# Patient Record
Sex: MALE | Race: WHITE | NOT HISPANIC OR LATINO | Employment: FULL TIME | URBAN - METROPOLITAN AREA
[De-identification: names, ages, dates, MRNs, and addresses within clinical notes are randomized per-mention and may not be internally consistent; named-entity substitution may affect disease eponyms.]

---

## 2018-08-29 ENCOUNTER — OFFICE VISIT (OUTPATIENT)
Dept: FAMILY MEDICINE CLINIC | Facility: CLINIC | Age: 53
End: 2018-08-29
Payer: COMMERCIAL

## 2018-08-29 VITALS — BODY MASS INDEX: 26.92 KG/M2 | HEIGHT: 70 IN | TEMPERATURE: 97.5 F | WEIGHT: 188 LBS

## 2018-08-29 DIAGNOSIS — R21 RASH: Primary | ICD-10-CM

## 2018-08-29 PROCEDURE — 3008F BODY MASS INDEX DOCD: CPT | Performed by: FAMILY MEDICINE

## 2018-08-29 PROCEDURE — 1036F TOBACCO NON-USER: CPT | Performed by: FAMILY MEDICINE

## 2018-08-29 PROCEDURE — 99213 OFFICE O/P EST LOW 20 MIN: CPT | Performed by: FAMILY MEDICINE

## 2018-08-29 RX ORDER — METHYLPREDNISOLONE 4 MG/1
TABLET ORAL
Qty: 21 TABLET | Refills: 0 | Status: SHIPPED | OUTPATIENT
Start: 2018-08-29 | End: 2019-10-15 | Stop reason: SDUPTHER

## 2018-08-29 RX ORDER — METHYLPREDNISOLONE ACETATE 80 MG/ML
80 INJECTION, SUSPENSION INTRA-ARTICULAR; INTRALESIONAL; INTRAMUSCULAR; SOFT TISSUE ONCE
Status: COMPLETED | OUTPATIENT
Start: 2018-08-29 | End: 2018-08-29

## 2018-08-29 RX ADMIN — METHYLPREDNISOLONE ACETATE 80 MG: 80 INJECTION, SUSPENSION INTRA-ARTICULAR; INTRALESIONAL; INTRAMUSCULAR; SOFT TISSUE at 19:33

## 2018-08-29 NOTE — PROGRESS NOTES
Assessment/Plan:    No problem-specific Assessment & Plan notes found for this encounter  Diagnoses and all orders for this visit:    Rash  -     Methylprednisolone 4 MG TBPK; Use as directed on package          Subjective:      Patient ID: Manny Grayson is a 46 y o  male  Pt complains of poison rash starting 3 days ago, pt got into it prior, pt has tried benadryl and ivy soap which is not helping, it is itching        The following portions of the patient's history were reviewed and updated as appropriate: allergies, current medications, past family history, past medical history, past social history, past surgical history and problem list     Review of Systems   Constitutional: Negative for chills and fever  Respiratory: Negative for shortness of breath and wheezing  Objective:      Temp 97 5 °F (36 4 °C)   Ht 5' 10" (1 778 m)   Wt 85 3 kg (188 lb)   BMI 26 98 kg/m²          Physical Exam   Constitutional: He is oriented to person, place, and time  He appears well-developed and well-nourished  No distress  HENT:   Head: Normocephalic and atraumatic  Eyes: Conjunctivae and EOM are normal  Pupils are equal, round, and reactive to light  No scleral icterus  Neck: Normal range of motion  Neck supple  Cardiovascular: Normal rate, regular rhythm and normal heart sounds  No murmur heard  Pulmonary/Chest: Effort normal and breath sounds normal  No respiratory distress  He has no wheezes  He has no rales  Musculoskeletal: He exhibits no edema  Lymphadenopathy:     He has no cervical adenopathy  Neurological: He is alert and oriented to person, place, and time  He exhibits normal muscle tone  Skin: Skin is warm and dry  Rash noted  He is not diaphoretic  No erythema  No pallor  Psychiatric: He has a normal mood and affect  His behavior is normal  Judgment and thought content normal    Nursing note and vitals reviewed

## 2019-09-10 ENCOUNTER — VBI (OUTPATIENT)
Dept: ADMINISTRATIVE | Facility: OTHER | Age: 54
End: 2019-09-10

## 2019-09-10 NOTE — TELEPHONE ENCOUNTER
Manny Grayson    ED Visit Information     Ed visit date: 8/26/19  Diagnosis Description: FOOD IN ESOPHAGUS CAUSING OTHER INJURY, INITIAL ENCOUNTER  In Network? No  Discharge status: Home  Discharged with meds ? No  Number of ED visits to date: 1  ED Severity:3     Outreach Information    Outreach successful: Yes 1   letter mailed:0  Date Finalized:9/10/19    Care Coordination    Follow up appointment with pcp: no declined  Transportation issues ? No    [employer] type:  7 Day Outreach  Emergent necessity warranted by diagnosis:  Yes  [facility] PCP:  Yes  Transportation:  Friend/Family Transport  Called PCP first?:  No  Feels able to call PCP for urgent problems ?:  Yes  Understands what emergencies can be handled by PCP ?:  Yes  Ever any problems getting appointment with PCP for minor emergency/urgency problems?:  No  Practice Contacted Patient ?:  No  Pt had ED follow up with pcp/staff ?:  No    Reason Patient went to ED instead of Urgent Care or PCP?:  Perceived Severity of Illness, Proximity  Urgent care Education?:  No  09/10/2019 11:32 AM Phone ([facility] Michael Blanton (Self) [phone] (H) Remove  Left Message - Attx1 - food in esophagus lvh    I spoke to Madonna King he stated he is doing better he declined follow up with his pcp  He went to [facility] due to how close it is to his home, and the severity of his complication pt stated the food was lodged for almost 11hrs until it went through - Beef brisket  [facility] is aware of [facility] to his home

## 2019-10-15 ENCOUNTER — OFFICE VISIT (OUTPATIENT)
Dept: FAMILY MEDICINE CLINIC | Facility: CLINIC | Age: 54
End: 2019-10-15
Payer: COMMERCIAL

## 2019-10-15 VITALS
HEIGHT: 70 IN | SYSTOLIC BLOOD PRESSURE: 124 MMHG | BODY MASS INDEX: 27.03 KG/M2 | HEART RATE: 86 BPM | DIASTOLIC BLOOD PRESSURE: 86 MMHG | OXYGEN SATURATION: 95 % | WEIGHT: 188.8 LBS | TEMPERATURE: 98.6 F

## 2019-10-15 DIAGNOSIS — Z12.11 SCREENING FOR COLON CANCER: ICD-10-CM

## 2019-10-15 DIAGNOSIS — L24.9 IRRITANT CONTACT DERMATITIS, UNSPECIFIED TRIGGER: Primary | ICD-10-CM

## 2019-10-15 DIAGNOSIS — Z13.31 DEPRESSION SCREENING NEGATIVE: ICD-10-CM

## 2019-10-15 PROCEDURE — 3008F BODY MASS INDEX DOCD: CPT | Performed by: NURSE PRACTITIONER

## 2019-10-15 PROCEDURE — 99213 OFFICE O/P EST LOW 20 MIN: CPT | Performed by: NURSE PRACTITIONER

## 2019-10-15 RX ORDER — METHYLPREDNISOLONE 4 MG/1
TABLET ORAL
Qty: 21 TABLET | Refills: 0 | Status: SHIPPED | OUTPATIENT
Start: 2019-10-15 | End: 2022-05-05 | Stop reason: ALTCHOICE

## 2019-10-15 RX ORDER — TRIAMCINOLONE ACETONIDE 40 MG/ML
40 INJECTION, SUSPENSION INTRA-ARTICULAR; INTRAMUSCULAR ONCE
Status: SHIPPED | OUTPATIENT
Start: 2019-10-15

## 2019-10-15 NOTE — PROGRESS NOTES
Assessment/Plan:    No problem-specific Assessment & Plan notes found for this encounter  Diagnoses and all orders for this visit:    Irritant contact dermatitis, unspecified trigger  Comments:  Rx for Medrol dose pack provided, Kenalog 40mg POCT provided  Orders:  -     methylPREDNISolone 4 MG tablet therapy pack; Use as directed on package    Screening for colon cancer  Comments:    Cologuard ordered  Orders:  -     Cologuard; Future    BMI 27 0-27 9,adult  Comments:  Pt counciled    Depression screening negative    Other orders  -     Cancel: Ambulatory referral to Gastroenterology; Future          Subjective:      Patient ID: Lilia Longo is a 53 y o  male  Rash  Patient presents for evaluation of a rash involving the face, forearm, hand, lower extremity and upper extremity  Rash started 2 days ago  Lesions are red, and raised in texture  Rash has changed over time  Rash is pruritic  Associated symptoms: itchy eyes  Patient denies: abdominal pain, crankiness, decrease in appetite and decrease in energy level  Patient has had contacts with similar rash  Patient has had new exposures , plants while cutting wood 2 days ago  Pt states rash is spreading and worsening itch      Pt declines physical exam and lab work             The following portions of the patient's history were reviewed and updated as appropriate: He  has no past medical history on file  He   Patient Active Problem List    Diagnosis Date Noted    BMI 27 0-27 9,adult 10/15/2019    Pneumonia 05/11/2016    Hypercholesteremia 05/10/2016     He  has no past surgical history on file  His family history includes Coronary artery disease in his father; Diabetes in his father; Kidney failure in his father  He  reports that he has never smoked  He uses smokeless tobacco  He reports that he drinks alcohol  He reports that he does not use drugs    Current Outpatient Medications   Medication Sig Dispense Refill    methylPREDNISolone 4 MG tablet therapy pack Use as directed on package 21 tablet 0     No current facility-administered medications for this visit  Current Outpatient Medications on File Prior to Visit   Medication Sig    [DISCONTINUED] Methylprednisolone 4 MG TBPK Use as directed on package (Patient not taking: Reported on 10/15/2019)     No current facility-administered medications on file prior to visit  He has No Known Allergies       Review of Systems   Constitutional: Negative  Itching   HENT: Negative  Eyes: Negative  Respiratory: Negative  Cardiovascular: Negative  Gastrointestinal: Negative  Endocrine: Negative  Genitourinary: Negative  Musculoskeletal: Negative  Skin: Positive for color change and rash  Scattered itching rash X2 days   Allergic/Immunologic: Negative  Neurological: Negative  Hematological: Negative  Psychiatric/Behavioral: Negative  BMI Counseling: Body mass index is 27 09 kg/m²  Discussed the patient's BMI with him  The BMI is above normal  Nutrition recommendations include reducing portion sizes, decreasing overall calorie intake, 3-5 servings of fruits/vegetables daily, reducing fast food intake, consuming healthier snacks, decreasing soda and/or juice intake, moderation in carbohydrate intake, increasing intake of lean protein, reducing intake of saturated fat and trans fat and reducing intake of cholesterol  Exercise recommendations include exercising 3-5 times per week  PHQ-9 Depression Screening    PHQ-9:    Frequency of the following problems over the past two weeks:       Little interest or pleasure in doing things:  0 - not at all  Feeling down, depressed, or hopeless:  0 - not at all  PHQ-2 Score:  0        Depression Screening Follow-up Plan: Patient's depression screening was negative with a PHQ-2 score of 0  Their PHQ-9 score was   Clinically patient does not have depression  No treatment is required        Objective:      /86 Pulse 86   Temp 98 6 °F (37 °C) (Tympanic)   Ht 5' 10" (1 778 m)   Wt 85 6 kg (188 lb 12 8 oz)   SpO2 95%   BMI 27 09 kg/m²          Physical Exam   Constitutional: He is oriented to person, place, and time  He appears well-developed and well-nourished  HENT:   Head: Normocephalic  Eyes: Pupils are equal, round, and reactive to light  Conjunctivae and EOM are normal    Neck: Normal range of motion  Neck supple  Cardiovascular: Normal rate and regular rhythm  Pulmonary/Chest: Effort normal and breath sounds normal    Abdominal: Soft  Bowel sounds are normal    Musculoskeletal: Normal range of motion  Neurological: He is alert and oriented to person, place, and time  Skin: Skin is warm and dry  Rash noted  Rash is papular and urticarial  There is erythema  Psychiatric: He has a normal mood and affect   His behavior is normal  Judgment and thought content normal

## 2019-10-28 LAB
COLOGUARD RESULT REPORTABLE: NEGATIVE
EXTERNAL COLOGUARD RESULT: NEGATIVE

## 2020-03-11 ENCOUNTER — CLINICAL SUPPORT (OUTPATIENT)
Dept: FAMILY MEDICINE CLINIC | Facility: CLINIC | Age: 55
End: 2020-03-11
Payer: COMMERCIAL

## 2020-03-11 DIAGNOSIS — Z23 ENCOUNTER FOR IMMUNIZATION: Primary | ICD-10-CM

## 2020-03-11 PROCEDURE — 90715 TDAP VACCINE 7 YRS/> IM: CPT

## 2020-03-11 PROCEDURE — 90471 IMMUNIZATION ADMIN: CPT

## 2022-04-04 ENCOUNTER — APPOINTMENT (EMERGENCY)
Dept: RADIOLOGY | Facility: HOSPITAL | Age: 57
End: 2022-04-04
Payer: COMMERCIAL

## 2022-04-04 ENCOUNTER — HOSPITAL ENCOUNTER (EMERGENCY)
Facility: HOSPITAL | Age: 57
Discharge: HOME | End: 2022-04-04
Attending: EMERGENCY MEDICINE
Payer: COMMERCIAL

## 2022-04-04 VITALS
TEMPERATURE: 97 F | BODY MASS INDEX: 28.27 KG/M2 | WEIGHT: 197 LBS | SYSTOLIC BLOOD PRESSURE: 125 MMHG | DIASTOLIC BLOOD PRESSURE: 79 MMHG | HEART RATE: 72 BPM | RESPIRATION RATE: 16 BRPM | OXYGEN SATURATION: 97 %

## 2022-04-04 DIAGNOSIS — M62.82 RHABDOMYOLYSIS: Primary | ICD-10-CM

## 2022-04-04 LAB
2HR DELTA HS TROPONIN: 0 NG/L
ALBUMIN SERPL BCP-MCNC: 4.3 G/DL (ref 3.5–5)
ALP SERPL-CCNC: 74 U/L (ref 34–104)
ALT SERPL W P-5'-P-CCNC: 49 U/L (ref 7–52)
ANION GAP SERPL CALCULATED.3IONS-SCNC: 9 MMOL/L (ref 4–13)
AST SERPL W P-5'-P-CCNC: 44 U/L (ref 13–39)
ATRIAL RATE: 61 BPM
ATRIAL RATE: 67 BPM
BASOPHILS # BLD AUTO: 0.09 THOUSANDS/ΜL (ref 0–0.1)
BASOPHILS NFR BLD AUTO: 1 % (ref 0–1)
BILIRUB SERPL-MCNC: 0.65 MG/DL (ref 0.2–1)
BNP SERPL-MCNC: 27 PG/ML (ref 1–100)
BUN SERPL-MCNC: 24 MG/DL (ref 5–25)
CALCIUM SERPL-MCNC: 9.5 MG/DL (ref 8.4–10.2)
CARDIAC TROPONIN I PNL SERPL HS: 5 NG/L
CARDIAC TROPONIN I PNL SERPL HS: 5 NG/L
CHLORIDE SERPL-SCNC: 103 MMOL/L (ref 96–108)
CK MB SERPL-MCNC: 1.6 % (ref 0–2.5)
CK MB SERPL-MCNC: 1.7 % (ref 0–2.5)
CK MB SERPL-MCNC: 13.5 NG/ML (ref 0.6–6.3)
CK MB SERPL-MCNC: 15.3 NG/ML (ref 0.6–6.3)
CK SERPL-CCNC: 789 U/L (ref 39–308)
CK SERPL-CCNC: 977 U/L (ref 39–308)
CO2 SERPL-SCNC: 26 MMOL/L (ref 21–32)
CREAT SERPL-MCNC: 1.17 MG/DL (ref 0.6–1.3)
D DIMER PPP FEU-MCNC: 0.39 UG/ML FEU
EOSINOPHIL # BLD AUTO: 0.27 THOUSAND/ΜL (ref 0–0.61)
EOSINOPHIL NFR BLD AUTO: 4 % (ref 0–6)
ERYTHROCYTE [DISTWIDTH] IN BLOOD BY AUTOMATED COUNT: 13.4 % (ref 11.6–15.1)
GFR SERPL CREATININE-BSD FRML MDRD: 68 ML/MIN/1.73SQ M
GLUCOSE SERPL-MCNC: 97 MG/DL (ref 65–140)
HCT VFR BLD AUTO: 45.7 % (ref 36.5–49.3)
HGB BLD-MCNC: 15.4 G/DL (ref 12–17)
IMM GRANULOCYTES # BLD AUTO: 0.02 THOUSAND/UL (ref 0–0.2)
IMM GRANULOCYTES NFR BLD AUTO: 0 % (ref 0–2)
LACTATE SERPL-SCNC: 0.8 MMOL/L (ref 0.5–2)
LIPASE SERPL-CCNC: 68 U/L (ref 11–82)
LYMPHOCYTES # BLD AUTO: 1.69 THOUSANDS/ΜL (ref 0.6–4.47)
LYMPHOCYTES NFR BLD AUTO: 24 % (ref 14–44)
MAGNESIUM SERPL-MCNC: 1.9 MG/DL (ref 1.9–2.7)
MCH RBC QN AUTO: 28.5 PG (ref 26.8–34.3)
MCHC RBC AUTO-ENTMCNC: 33.7 G/DL (ref 31.4–37.4)
MCV RBC AUTO: 85 FL (ref 82–98)
MONOCYTES # BLD AUTO: 0.72 THOUSAND/ΜL (ref 0.17–1.22)
MONOCYTES NFR BLD AUTO: 10 % (ref 4–12)
NEUTROPHILS # BLD AUTO: 4.29 THOUSANDS/ΜL (ref 1.85–7.62)
NEUTS SEG NFR BLD AUTO: 61 % (ref 43–75)
NRBC BLD AUTO-RTO: 0 /100 WBCS
P AXIS: 60 DEGREES
P AXIS: 61 DEGREES
PLATELET # BLD AUTO: 200 THOUSANDS/UL (ref 149–390)
PMV BLD AUTO: 10.1 FL (ref 8.9–12.7)
POTASSIUM SERPL-SCNC: 3.6 MMOL/L (ref 3.5–5.3)
PR INTERVAL: 162 MS
PR INTERVAL: 162 MS
PROT SERPL-MCNC: 6.8 G/DL (ref 6.4–8.4)
QRS AXIS: 46 DEGREES
QRS AXIS: 48 DEGREES
QRSD INTERVAL: 94 MS
QRSD INTERVAL: 96 MS
QT INTERVAL: 424 MS
QT INTERVAL: 452 MS
QTC INTERVAL: 448 MS
QTC INTERVAL: 455 MS
RBC # BLD AUTO: 5.4 MILLION/UL (ref 3.88–5.62)
SODIUM SERPL-SCNC: 138 MMOL/L (ref 135–147)
T WAVE AXIS: 10 DEGREES
T WAVE AXIS: 17 DEGREES
TSH SERPL DL<=0.05 MIU/L-ACNC: 1.86 UIU/ML (ref 0.45–5.33)
VENTRICULAR RATE: 61 BPM
VENTRICULAR RATE: 67 BPM
WBC # BLD AUTO: 7.08 THOUSAND/UL (ref 4.31–10.16)

## 2022-04-04 PROCEDURE — 83880 ASSAY OF NATRIURETIC PEPTIDE: CPT | Performed by: EMERGENCY MEDICINE

## 2022-04-04 PROCEDURE — 99284 EMERGENCY DEPT VISIT MOD MDM: CPT | Performed by: EMERGENCY MEDICINE

## 2022-04-04 PROCEDURE — 99284 EMERGENCY DEPT VISIT MOD MDM: CPT

## 2022-04-04 PROCEDURE — 83690 ASSAY OF LIPASE: CPT | Performed by: EMERGENCY MEDICINE

## 2022-04-04 PROCEDURE — 85379 FIBRIN DEGRADATION QUANT: CPT | Performed by: EMERGENCY MEDICINE

## 2022-04-04 PROCEDURE — 93010 ELECTROCARDIOGRAM REPORT: CPT | Performed by: INTERNAL MEDICINE

## 2022-04-04 PROCEDURE — 80053 COMPREHEN METABOLIC PANEL: CPT | Performed by: EMERGENCY MEDICINE

## 2022-04-04 PROCEDURE — 84484 ASSAY OF TROPONIN QUANT: CPT | Performed by: EMERGENCY MEDICINE

## 2022-04-04 PROCEDURE — 71045 X-RAY EXAM CHEST 1 VIEW: CPT

## 2022-04-04 PROCEDURE — 83605 ASSAY OF LACTIC ACID: CPT | Performed by: EMERGENCY MEDICINE

## 2022-04-04 PROCEDURE — 82550 ASSAY OF CK (CPK): CPT | Performed by: EMERGENCY MEDICINE

## 2022-04-04 PROCEDURE — 96361 HYDRATE IV INFUSION ADD-ON: CPT

## 2022-04-04 PROCEDURE — 96360 HYDRATION IV INFUSION INIT: CPT

## 2022-04-04 PROCEDURE — 85025 COMPLETE CBC W/AUTO DIFF WBC: CPT | Performed by: EMERGENCY MEDICINE

## 2022-04-04 PROCEDURE — 36415 COLL VENOUS BLD VENIPUNCTURE: CPT | Performed by: EMERGENCY MEDICINE

## 2022-04-04 PROCEDURE — 93005 ELECTROCARDIOGRAM TRACING: CPT

## 2022-04-04 PROCEDURE — 83735 ASSAY OF MAGNESIUM: CPT | Performed by: EMERGENCY MEDICINE

## 2022-04-04 PROCEDURE — 82553 CREATINE MB FRACTION: CPT | Performed by: EMERGENCY MEDICINE

## 2022-04-04 PROCEDURE — 84443 ASSAY THYROID STIM HORMONE: CPT | Performed by: EMERGENCY MEDICINE

## 2022-04-04 RX ORDER — SODIUM CHLORIDE 9 MG/ML
3 INJECTION INTRAVENOUS
Status: DISCONTINUED | OUTPATIENT
Start: 2022-04-04 | End: 2022-04-04 | Stop reason: HOSPADM

## 2022-04-04 RX ADMIN — SODIUM CHLORIDE 1000 ML: 0.9 INJECTION, SOLUTION INTRAVENOUS at 02:22

## 2022-04-04 RX ADMIN — SODIUM CHLORIDE 1000 ML: 0.9 INJECTION, SOLUTION INTRAVENOUS at 03:20

## 2022-04-04 NOTE — Clinical Note
Valerie Butler was seen and treated in our emergency department on 4/4/2022  Diagnosis:     Suhas Job  may return to work on return date  He may return on this date: 04/06/2022         If you have any questions or concerns, please don't hesitate to call        Jareth Rousseau MD    ______________________________           _______________          _______________  Hospital Representative                              Date                                Time

## 2022-04-04 NOTE — ED PROCEDURE NOTE
PROCEDURE  ECG 12 Lead Documentation Only    Date/Time: 4/4/2022 1:54 AM  Performed by: Ildefonso Aleman MD  Authorized by: Ildefonso Aleman MD     Indications / Diagnosis:  Diaphoresis  Patient location:  ED  Interpretation:     Interpretation: normal    Rate:     ECG rate:  67    ECG rate assessment: normal    Rhythm:     Rhythm: sinus rhythm    Ectopy:     Ectopy: none    QRS:     QRS axis:  Normal    QRS intervals:  Normal  Conduction:     Conduction: normal    ST segments:     ST segments:  Non-specific  T waves:     T waves: non-specific           Ildefonso Aleman MD  04/04/22 [id number]

## 2022-04-04 NOTE — ED PROCEDURE NOTE
PROCEDURE  ECG 12 Lead Documentation Only    Date/Time: 4/4/2022 2:56 AM  Performed by: Jayshree Baires MD  Authorized by: Jayshree Baires MD     Indications / Diagnosis:  Diaphoresis  ECG reviewed by me, the ED Provider: yes    Previous ECG:     Comparison to cardiac monitor: Yes    Interpretation:     Interpretation: non-specific    Rate:     ECG rate:  61    ECG rate assessment: normal    Rhythm:     Rhythm: sinus rhythm    Ectopy:     Ectopy: none    QRS:     QRS axis:  Normal    QRS intervals:  Normal  Conduction:     Conduction: normal    ST segments:     ST segments:  Non-specific  T waves:     T waves: non-specific           Jayshree Baires MD  04/04/22 [id number]

## 2022-04-04 NOTE — ED PROVIDER NOTES
History  Chief Complaint   Patient presents with    Nausea     feeling jittery       61-YEAR-OLD MALE  PMH:   None, h/o hypertriglyceridemia years ago  No f/u   Non smoker, no drugs/etoh    Fam:   Father had MI in his 42's  Brother  at 39 of massive MI  He was a smoker      CCl:  Pt here for feeling sweats, "it feels like I am going nuts on caffeine"  I had some pain down the left arm  The jitteriness comes and goes      MODE OF TRANSPORT  AMBULATORY, PATIENT WAS BROUGHT BY PRIVATE CAR      PATIENT HAS HAD NO COMPLAINTS OF CHEST PAIN OR PRESSURE  NO SHORTNESS OF BREATH      HE DENIES ANY PAIN TO THE JAW NECK OR THE BACK  HE DENIES PAIN WITH DEEP BREATH      NO SYNCOPE OR NEAR SYNCOPE   NO PALPITATIONS      INTERVENTIONS: NONE      TRAUMA:  NONE  PATIENT DID NOT FALL OR HIT HER HEAD  HE HAS NO INJURY TO ARMS OR LEGS  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        INFECTIOUS SYMPTOMS:   PATIENT DENIES FEVERS, REPORTS CHILLS  NO SINUS SYMPTOMS  NO HEADACHE OR NECK PAIN, NO DIZZINESS  PT WAS LIGHTHEADED EARLIER   HE DENIES SORE THROAT, DENIES SINUS CONGESTION      VTE  RISK FACTORS:  NONE  NO HISTORY OF PE OR DVT  NO LONG CAR RIDES OR PLANE RIDES  NO IMMOBILIZATION  NO RECENT SURGERY OR TRAUMA  DENIES HEMOPTYSIS  OTHER ASSOCIATED SYMPTOMS:  NO ABDOMINAL PAIN  NO ACUTE BACK PAIN  NO NAUSEA OR VOMITING  NO SOURCES OF BLEEDING  NO BLACK OR BLOODY STOOLS  NO STOOL CHANGES  NO URINARY COMPLAINTS: NO DYSURIA, NO HEMATURIA, NO FREQUENCY          Nausea  The primary symptoms include nausea  Primary symptoms do not include fever, weight loss, fatigue, abdominal pain, vomiting, diarrhea, melena, hematemesis, jaundice, hematochezia, dysuria, myalgias, arthralgias or rash  The illness is also significant for chills  The illness does not include odynophagia, bloating, constipation or back pain     Fatigue  Severity:  Mild  Onset quality:  Gradual  Relieved by: Nothing  Worsened by:  Nothing  Ineffective treatments:  None tried  Associated symptoms: nausea    Associated symptoms: no abdominal pain, no arthralgias, no ataxia, no chest pain, no cough, no diarrhea, no difficulty walking, no dizziness, no dysuria, no numbness in extremities, no falls, no fever, no foul-smelling urine, no frequency, no headaches, no hematochezia, no lethargy, no loss of consciousness, no melena, no myalgias, no near-syncope, no seizures, no sensory-motor deficit, no shortness of breath, no stroke symptoms, no syncope, no urgency, no vision change and no vomiting        Prior to Admission Medications   Prescriptions Last Dose Informant Patient Reported? Taking? 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), distal amputation        Family History   Problem Relation Age of Onset    No Known Problems Mother     Coronary artery disease Father     Kidney failure Father     Heart attack Father     Diabetes type II Father     Heart attack Sister      I have reviewed and agree with the history as documented  E-Cigarette/Vaping    E-Cigarette Use Never User      E-Cigarette/Vaping Substances     Social History     Tobacco Use    Smoking status: Never Smoker    Smokeless tobacco: Former User   Vaping Use    Vaping Use: Never used   Substance Use Topics    Alcohol use: Not Currently     Comment: once per month    Drug use: No       Review of Systems   Constitutional: Positive for chills and diaphoresis  Negative for fatigue, fever and weight loss     HENT: Negative for ear discharge, ear pain, facial swelling, rhinorrhea, sinus pressure, sinus pain, sneezing, sore throat, trouble swallowing and voice change  Eyes: Negative for photophobia, pain, discharge, redness, itching and visual disturbance  Respiratory: Negative for cough, shortness of breath, wheezing and stridor  Cardiovascular: Negative for chest pain, palpitations, leg swelling, syncope and near-syncope  Gastrointestinal: Positive for nausea  Negative for abdominal pain, bloating, blood in stool, constipation, diarrhea, hematemesis, hematochezia, jaundice, melena and vomiting  Genitourinary: Negative for difficulty urinating, dysuria, flank pain, frequency, hematuria and urgency  Musculoskeletal: Negative for arthralgias, back pain, falls, gait problem, joint swelling, myalgias, neck pain and neck stiffness  Skin: Negative for rash and wound  Neurological: Positive for light-headedness  Negative for dizziness, seizures, loss of consciousness, syncope, facial asymmetry, speech difficulty, numbness and headaches  All other systems reviewed and are negative  Physical Exam  Physical Exam  Constitutional:       General: He is not in acute distress  Appearance: He is well-developed  He is not ill-appearing, toxic-appearing or diaphoretic  HENT:      Head: Normocephalic and atraumatic  Right Ear: External ear normal       Left Ear: External ear normal       Nose: Nose normal  No congestion or rhinorrhea  Eyes:      General: No scleral icterus  Right eye: No discharge  Left eye: No discharge  Extraocular Movements: Extraocular movements intact  Conjunctiva/sclera: Conjunctivae normal       Pupils: Pupils are equal, round, and reactive to light  Neck:      Vascular: No JVD  Trachea: No tracheal deviation  Cardiovascular:      Rate and Rhythm: Normal rate and regular rhythm  Pulses: Normal pulses  Heart sounds: Normal heart sounds  No murmur heard  No friction rub  No gallop  Pulmonary:      Effort: Pulmonary effort is normal  No respiratory distress  Breath sounds: Normal breath sounds  No stridor  No wheezing, rhonchi or rales  Chest:      Chest wall: No tenderness  Abdominal:      General: Bowel sounds are normal  There is no distension  Palpations: Abdomen is soft  There is no mass  Tenderness: There is no abdominal tenderness  There is no right CVA tenderness, left CVA tenderness, guarding or rebound  Hernia: No hernia is present  Musculoskeletal:         General: No swelling, tenderness, deformity or signs of injury  Normal range of motion  Cervical back: Normal range of motion and neck supple  No rigidity or tenderness  Right lower leg: No edema  Left lower leg: No edema  Lymphadenopathy:      Cervical: No cervical adenopathy  Skin:     General: Skin is warm  Capillary Refill: Capillary refill takes less than 2 seconds  Coloration: Skin is not jaundiced or pale  Findings: No bruising, erythema, lesion or rash  Neurological:      General: No focal deficit present  Mental Status: He is alert and oriented to person, place, and time  Mental status is at baseline  Cranial Nerves: No cranial nerve deficit  Sensory: No sensory deficit  Motor: No weakness or abnormal muscle tone  Coordination: Coordination normal    Psychiatric:         Mood and Affect: Mood normal          Behavior: Behavior normal          Thought Content:  Thought content normal          Judgment: Judgment normal          Vital Signs  ED Triage Vitals   Temperature Pulse Respirations Blood Pressure SpO2   04/04/22 0104 04/04/22 0104 04/04/22 0104 04/04/22 0104 04/04/22 0104   (!) 97 °F (36 1 °C) 74 16 125/79 96 %      Temp src Heart Rate Source Patient Position - Orthostatic VS BP Location FiO2 (%)   -- 04/04/22 0540 -- -- --    Monitor         Pain Score       04/04/22 0104       No Pain           Vitals:    04/04/22 0104 04/04/22 0540   BP: 125/79    Pulse: 74 72         Visual Acuity      ED Medications  Medications   sodium chloride (PF) 0 9 % injection 3 mL (has no administration in time range)   sodium chloride 0 9 % bolus 1,000 mL (0 mL Intravenous Stopped 4/4/22 0328)   sodium chloride 0 9 % bolus 1,000 mL (0 mL Intravenous Stopped 4/4/22 0435)       Diagnostic Studies  Results Reviewed     Procedure Component Value Units Date/Time    CKMB [[id number]]  (Abnormal) Collected: 04/04/22 0441    Lab Status: Final result Specimen: Blood from Arm, Right Updated: 04/04/22 0535     CK-MB Index 1 7 %      CK-MB 13 5 ng/mL     CK (with reflex to MB) [[id number]]  (Abnormal) Collected: 04/04/22 0441    Lab Status: Final result Specimen: Blood from Arm, Right Updated: 04/04/22 0509     Total  U/L     HS Troponin I 4hr [[id number]]     Lab Status: No result Specimen: Blood     HS Troponin I 2hr [[id number]]  (Normal) Collected: 04/04/22 0304    Lab Status: Final result Specimen: Blood from Arm, Right Updated: 04/04/22 0332     hs TnI 2hr 5 ng/L      Delta 2hr hsTnI 0 ng/L     CKMB [[id number]]  (Abnormal) Collected: 04/04/22 0143    Lab Status: Final result Specimen: Blood from Arm, Right Updated: 04/04/22 0232     CK-MB Index 1 6 %      CK-MB 15 3 ng/mL     TSH [[id number]]  (Normal) Collected: 04/04/22 0143    Lab Status: Final result Specimen: Blood from Arm, Right Updated: 04/04/22 0222     TSH 3RD GENERATON 1 865 uIU/mL     Narrative:      Patients undergoing fluorescein dye angiography may retain small amounts of fluorescein in the body for 48-72 hours post procedure  Samples containing fluorescein can produce falsely depressed TSH values  If the patient had this procedure,a specimen should be resubmitted post fluorescein clearance        B-Type Natriuretic Peptide(BNP) CA, GH, EA Campuses Only [[id number]]  (Normal) Collected: 04/04/22 0143    Lab Status: Final result Specimen: Blood from Arm, Right Updated: 04/04/22 0216     BNP 27 pg/mL HS Troponin 0hr (reflex protocol) [[id number]]  (Normal) Collected: 04/04/22 0143    Lab Status: Final result Specimen: Blood from Arm, Right Updated: 04/04/22 0213     hs TnI 0hr 5 ng/L     Lactic acid [[id number]]  (Normal) Collected: 04/04/22 0143    Lab Status: Final result Specimen: Blood from Arm, Right Updated: 04/04/22 0210     LACTIC ACID 0 8 mmol/L     Narrative:      Result may be elevated if tourniquet was used during collection      Comprehensive metabolic panel [[id number]]  (Abnormal) Collected: 04/04/22 0143    Lab Status: Final result Specimen: Blood from Arm, Right Updated: 04/04/22 0210     Sodium 138 mmol/L      Potassium 3 6 mmol/L      Chloride 103 mmol/L      CO2 26 mmol/L      ANION GAP 9 mmol/L      BUN 24 mg/dL      Creatinine 1 17 mg/dL      Glucose 97 mg/dL      Calcium 9 5 mg/dL      AST 44 U/L      ALT 49 U/L      Alkaline Phosphatase 74 U/L      Total Protein 6 8 g/dL      Albumin 4 3 g/dL      Total Bilirubin 0 65 mg/dL      eGFR 68 ml/min/1 73sq m     Narrative:      [place] guidelines for Chronic Kidney Disease (CKD):     Stage 1 with normal or high GFR (GFR > 90 mL/min/1 73 square meters)    Stage 2 Mild CKD (GFR = 60-89 mL/min/1 73 square meters)    Stage 3A Moderate CKD (GFR = 45-59 mL/min/1 73 square meters)    Stage 3B Moderate CKD (GFR = 30-44 mL/min/1 73 square meters)    Stage 4 Severe CKD (GFR = 15-29 mL/min/1 73 square meters)    Stage 5 End Stage CKD (GFR <15 mL/min/1 73 square meters)  Note: GFR calculation is accurate only with a steady state creatinine    Magnesium [[id number]]  (Normal) Collected: 04/04/22 0143    Lab Status: Final result Specimen: Blood from Arm, Right Updated: 04/04/22 0210     Magnesium 1 9 mg/dL     Lipase [[id number]]  (Normal) Collected: 04/04/22 0143    Lab Status: Final result Specimen: Blood from Arm, Right Updated: 04/04/22 0210     Lipase 68 u/L     CK Total with Reflex CKMB [[id number]]  (Abnormal) Collected: 04/04/22 0143    Lab Status: Final result Specimen: Blood from Arm, Right Updated: 04/04/22 0210     Total  U/L     D-dimer, quantitative [[id number]]  (Normal) Collected: 04/04/22 0143    Lab Status: Final result Specimen: Blood from Arm, Right Updated: 04/04/22 0202     D-Dimer, Quant 0 39 ug/ml FEU     Narrative: In the evaluation for possible pulmonary embolism, in the appropriate (Well's Score of 4 or less) patient, the age adjusted d-dimer cutoff for this patient can be calculated as:    Age x 0 01 (in ug/mL) for Age-adjusted D-dimer exclusion threshold for a patient over 50 years      CBC and differential [[id number]] Collected: 04/04/22 0143    Lab Status: Final result Specimen: Blood from Arm, Right Updated: 04/04/22 0151     WBC 7 08 Thousand/uL      RBC 5 40 Million/uL      Hemoglobin 15 4 g/dL      Hematocrit 45 7 %      MCV 85 fL      MCH 28 5 pg      MCHC 33 7 g/dL      RDW 13 4 %      MPV 10 1 fL      Platelets 313 Thousands/uL      nRBC 0 /100 WBCs      Neutrophils Relative 61 %      Immat GRANS % 0 %      Lymphocytes Relative 24 %      Monocytes Relative 10 %      Eosinophils Relative 4 %      Basophils Relative 1 %      Neutrophils Absolute 4 29 Thousands/µL      Immature Grans Absolute 0 02 Thousand/uL      Lymphocytes Absolute 1 69 Thousands/µL      Monocytes Absolute 0 72 Thousand/µL      Eosinophils Absolute 0 27 Thousand/µL      Basophils Absolute 0 09 Thousands/µL                  X-ray chest 1 view portable    (Results Pending)              Procedures  Procedures         ED Course  ED Course as of 04/04/22 0558   Mon Apr 04, 2022   0122 Pt seen and evaluated    0206 CBC and differential   0206 D-dimer, quantitative   0245 Pt stable  Understands results of work up - mild Rhabdomyolysis   Will give 2 L NS  Will run delta troponin    0338 Delta 2hr hsTnI: 0   0451 Pt stable  IVF completed    0515 Total CK(!): 789   0516 Pt understands results  He will rest and drink plenty of fluids and f/u w/ PCP   He understands return precautions  He agrees w/ dx  He states he really overdid the heavy demolition and tile laying over the last 3 days   He is very appreciative of ED care and is ready to manage from home                                SBIRT 22yo+      Most Recent Value   SBIRT (22 yo +)    In order to provide better care to our patients, we are screening all of our patients for alcohol and drug use  Would it be okay to ask you these screening questions? Yes Filed at: 04/04/2022 0118   Initial Alcohol Screen: US AUDIT-C     1  How often do you have a drink containing alcohol? 1 Filed at: 04/04/2022 0118   2  How many drinks containing alcohol do you have on a typical day you are drinking? 0 Filed at: 04/04/2022 0118   3a  Male UNDER 65: How often do you have five or more drinks on one occasion? 0 Filed at: 04/04/2022 0118   3b  FEMALE Any Age, or MALE 65+: How often do you have 4 or more drinks on one occassion? 0 Filed at: 04/04/2022 0118   Audit-C Score 1 Filed at: 04/04/2022 0118   QING: How many times in the past year have you    Used an illegal drug or used a prescription medication for non-medical reasons? Never Filed at: 04/04/2022 0118                    MDM    Disposition  Final diagnoses:   Rhabdomyolysis     Time reflects when diagnosis was documented in both MDM as applicable and the Disposition within this note     Time User Action Codes Description Comment    4/4/2022  3:22 AM Natalia Liv Add [M62 82] Rhabdomyolysis       ED Disposition     ED Disposition Condition Date/Time Comment    Discharge Stable Mon Apr 4, 2022  5:16 AM Lalitha Pimentel discharge to home/self care  Follow-up Information     Follow up With Specialties Details Why Contact Lisa Quiroz, DO Family Medicine Call today  [address] [place] [id number] [phone]            Discharge Medication List as of 4/4/2022  5:16 AM      CONTINUE these medications which have NOT CHANGED    Details methylPREDNISolone 4 MG tablet therapy pack Use as directed on package, Normal             No discharge procedures on file      PDMP Review     None          ED Provider  Electronically Signed by           Mathew Akbar MD  04/04/22 1986

## 2022-04-04 NOTE — DISCHARGE INSTRUCTIONS
RETURN IF WORSE IN ANY WAY:   WORSENING CHEST PAIN,   SHORTNESS OF BREATH,   FEVER OR FLU LIKE SYMPTOMS,   OR NEW AND CONCERNING SYMPTOMS SIGNS OR SYMPTOMS      PLEASE CALL YOUR PRIMARY DOCTOR IN THE MORNING TO SET UP FOLLOW UP   PLEASE REVIEW THE WORK UP RESULTS WITH YOUR DOCTOR

## 2022-04-07 ENCOUNTER — OFFICE VISIT (OUTPATIENT)
Dept: FAMILY MEDICINE CLINIC | Facility: CLINIC | Age: 57
End: 2022-04-07
Payer: COMMERCIAL

## 2022-04-07 VITALS
DIASTOLIC BLOOD PRESSURE: 90 MMHG | TEMPERATURE: 98.3 F | OXYGEN SATURATION: 97 % | SYSTOLIC BLOOD PRESSURE: 140 MMHG | WEIGHT: 195 LBS | HEIGHT: 69 IN | BODY MASS INDEX: 28.88 KG/M2 | HEART RATE: 73 BPM

## 2022-04-07 DIAGNOSIS — R03.0 ELEVATED BP WITHOUT DIAGNOSIS OF HYPERTENSION: ICD-10-CM

## 2022-04-07 DIAGNOSIS — R53.83 OTHER FATIGUE: Primary | ICD-10-CM

## 2022-04-07 DIAGNOSIS — M62.82 NON-TRAUMATIC RHABDOMYOLYSIS: ICD-10-CM

## 2022-04-07 PROCEDURE — 99213 OFFICE O/P EST LOW 20 MIN: CPT | Performed by: NURSE PRACTITIONER

## 2022-04-07 PROCEDURE — 3008F BODY MASS INDEX DOCD: CPT | Performed by: NURSE PRACTITIONER

## 2022-04-07 PROCEDURE — 1036F TOBACCO NON-USER: CPT | Performed by: NURSE PRACTITIONER

## 2022-04-07 PROCEDURE — 3725F SCREEN DEPRESSION PERFORMED: CPT | Performed by: NURSE PRACTITIONER

## 2022-04-07 NOTE — PROGRESS NOTES
Assessment/Plan:    No problem-specific Assessment & Plan notes found for this encounter  Diagnoses and all orders for this visit:    Other fatigue  -     Lipid panel; Future  -     PSA, Total Screen; Future  -     Vitamin D 25 hydroxy; Future  -     Lyme Antibody Profile with reflex to WB; Future    Elevated BP without diagnosis of hypertension    Non-traumatic rhabdomyolysis          Subjective:      Patient ID: Edna Magana is a 61 y o  male  Patient presents for f/u from ER on 4/4/2022  He presented for jitters and pain in the left arm  He did alot of heavy demolition and tile lying the three days prior to the ER visit   to 789  CK-MB from 15 3 13 5  He had 2 L NSS in the ER  Patient states that since he has been discharged from the emergency department he is feeling better  His jitteriness has completely resolved as did his muscle aches  Spoke with patient regarding the importance of hydration especially while exerting himself  Signs and symptoms to monitor for reviewed with patient and his wife  Patient verbalized understanding  Patient's only complaint is chronic fatigue over the last few years  Labs ordered for patient  He does sleep usually about 6 or 7 hours and generally his sleep is restorative  Blood pressure is borderline at 1 40/90  Return in 4 weeks for annual physical with labs  The following portions of the patient's history were reviewed and updated as appropriate: allergies, current medications, past family history, past medical history, past social history, past surgical history and problem list     Review of Systems   Constitutional: Positive for fatigue  Negative for activity change, chills and fever  HENT: Negative for ear pain and sore throat  Eyes: Negative for pain and visual disturbance  Respiratory: Negative for cough and shortness of breath  Cardiovascular: Negative for chest pain and palpitations     Gastrointestinal: Negative for abdominal pain, constipation, diarrhea, nausea and vomiting  Genitourinary: Negative for dysuria and hematuria  Musculoskeletal: Negative for arthralgias, back pain and myalgias  Skin: Negative for color change and rash  Neurological: Negative for seizures, syncope and facial asymmetry  All other systems reviewed and are negative  Objective:      /90 (BP Location: Left arm, Patient Position: Sitting, Cuff Size: Standard)   Pulse 73   Temp 98 3 °F (36 8 °C) (Tympanic)   Ht 5' 9" (1 753 m)   Wt 88 5 kg (195 lb)   SpO2 97%   BMI 28 80 kg/m²          Physical Exam  Vitals and nursing note reviewed  Constitutional:       General: He is not in acute distress  Appearance: Normal appearance  He is not ill-appearing or toxic-appearing  HENT:      Head: Normocephalic and atraumatic  Cardiovascular:      Rate and Rhythm: Normal rate and regular rhythm  Pulses: Normal pulses  Heart sounds: Normal heart sounds  No murmur heard  No friction rub  No gallop  Pulmonary:      Effort: Pulmonary effort is normal  No respiratory distress  Breath sounds: Normal breath sounds  No stridor  No wheezing  Abdominal:      General: Abdomen is flat  Bowel sounds are normal       Palpations: Abdomen is soft  Tenderness: There is no abdominal tenderness  There is no guarding  Hernia: No hernia is present  Musculoskeletal:      Cervical back: Normal range of motion and neck supple  No rigidity  No muscular tenderness  Right lower leg: No edema  Left lower leg: No edema  Skin:     General: Skin is warm and dry  Capillary Refill: Capillary refill takes less than 2 seconds  Coloration: Skin is not jaundiced or pale  Findings: No bruising  Neurological:      General: No focal deficit present  Mental Status: He is alert and oriented to person, place, and time  Mental status is at baseline  Motor: No weakness        Gait: Gait normal    Psychiatric: Mood and Affect: Mood normal          Behavior: Behavior normal          Thought Content:  Thought content normal          Judgment: Judgment normal

## 2022-04-26 ENCOUNTER — RA CDI HCC (OUTPATIENT)
Dept: OTHER | Facility: HOSPITAL | Age: 57
End: 2022-04-26

## 2022-04-26 NOTE — PROGRESS NOTES
[facility]  coding opportunities       Chart reviewed, no opportunity found: CHART REVIEWED, NO OPPORTUNITY FOUND        Patients Insurance        Commercial Insurance: [address] [place] [address]

## 2022-04-30 ENCOUNTER — APPOINTMENT (OUTPATIENT)
Dept: LAB | Facility: CLINIC | Age: 57
End: 2022-04-30
Payer: COMMERCIAL

## 2022-05-03 ENCOUNTER — TELEPHONE (OUTPATIENT)
Dept: ADMINISTRATIVE | Facility: OTHER | Age: 57
End: 2022-05-03

## 2022-05-03 NOTE — TELEPHONE ENCOUNTER
----- Message from Kim Blanco sent at 2022  9:36 AM EDT -----  [address]  Contact: [phone]  22 9:36 AM    Rajat, our patient Juliette Levin has had CRC: Cologuard completed/performed  Please assist in updating the patient chart by pulling the Care Everywhere (CE) document  The date of service is 10/28/19       Thank you,  ARIEL Blanco PG

## 2022-05-05 ENCOUNTER — OFFICE VISIT (OUTPATIENT)
Dept: FAMILY MEDICINE CLINIC | Facility: CLINIC | Age: 57
End: 2022-05-05
Payer: COMMERCIAL

## 2022-05-05 VITALS
DIASTOLIC BLOOD PRESSURE: 80 MMHG | HEIGHT: 69 IN | OXYGEN SATURATION: 95 % | WEIGHT: 194.38 LBS | HEART RATE: 91 BPM | BODY MASS INDEX: 28.79 KG/M2 | TEMPERATURE: 98.9 F | SYSTOLIC BLOOD PRESSURE: 116 MMHG

## 2022-05-05 DIAGNOSIS — E78.1 HYPERTRIGLYCERIDEMIA: ICD-10-CM

## 2022-05-05 DIAGNOSIS — Z12.11 SCREENING FOR COLON CANCER: ICD-10-CM

## 2022-05-05 DIAGNOSIS — B49 FUNGAL INFECTION: ICD-10-CM

## 2022-05-05 DIAGNOSIS — Z00.00 ANNUAL PHYSICAL EXAM: Primary | ICD-10-CM

## 2022-05-05 PROCEDURE — 99396 PREV VISIT EST AGE 40-64: CPT | Performed by: NURSE PRACTITIONER

## 2022-05-05 PROCEDURE — 1036F TOBACCO NON-USER: CPT | Performed by: NURSE PRACTITIONER

## 2022-05-05 PROCEDURE — 3008F BODY MASS INDEX DOCD: CPT | Performed by: NURSE PRACTITIONER

## 2022-05-05 PROCEDURE — 3725F SCREEN DEPRESSION PERFORMED: CPT | Performed by: NURSE PRACTITIONER

## 2022-05-05 RX ORDER — KETOCONAZOLE 20 MG/G
CREAM TOPICAL DAILY
Qty: 15 G | Refills: 0 | Status: SHIPPED | OUTPATIENT
Start: 2022-05-05 | End: 2022-06-09

## 2022-05-05 NOTE — PATIENT INSTRUCTIONS
Heart Healthy Diet   WHAT YOU NEED TO KNOW:   A heart healthy diet is an eating plan low in unhealthy fats and sodium (salt)  The plan is high in healthy fats and fiber  A heart healthy diet helps improve your cholesterol levels and lowers your risk for heart disease and stroke  A dietitian will teach you how to read and understand food labels  DISCHARGE INSTRUCTIONS:   Heart healthy diet guidelines to follow:   · Choose foods that contain healthy fats  ? Unsaturated fats  include monounsaturated and polyunsaturated fats  Unsaturated fat is found in foods such as soybean, canola, olive, corn, and safflower oils  It is also found in soft tub margarine that is made with liquid vegetable oil  ? Omega-3 fat  is found in certain fish, such as salmon, tuna, and trout, and in walnuts and flaxseed  Eat fish high in omega-3 fats at least 2 times a week  · Get 20 to 30 grams of fiber each day  Fruits, vegetables, whole-grain foods, and legumes (cooked beans) are good sources of fiber  · Limit or do not have unhealthy fats  ? Cholesterol  is found in animal foods, such as eggs and lobster, and in dairy products made from whole milk  Limit cholesterol to less than 200 mg each day  ? Saturated fat  is found in meats, such as viramontes and hamburger  It is also found in chicken or turkey skin, whole milk, and butter  Limit saturated fat to less than 7% of your total daily calories  ? Trans fat  is found in packaged foods, such as potato chips and cookies  It is also in hard margarine, some fried foods, and shortening  Do not eat foods that contain trans fats  · Limit sodium as directed  You may be told to limit sodium to 2,000 to 2,300 mg each day  Choose low-sodium or no-salt-added foods  Add little or no salt to food you prepare  Use herbs and spices in place of salt         Include the following in your heart healthy plan:  Ask your dietitian or healthcare provider how many servings to have from each of the following food groups:  · Grains:      ? Whole-wheat breads, cereals, and pastas, and brown rice    ? Low-fat, low-sodium crackers and chips    · Vegetables:      ? Broccoli, green beans, green peas, and spinach    ? Collards, kale, and lima beans    ? Carrots, sweet potatoes, tomatoes, and peppers    ? Canned vegetables with no salt added    · Fruits:      ? Bananas, peaches, pears, and pineapple    ? Grapes, raisins, and dates    ? Oranges, tangerines, grapefruit, orange juice, and grapefruit juice    ? Apricots, mangoes, melons, and papaya    ? Raspberries and strawberries    ? Canned fruit with no added sugar    · Low-fat dairy:      ? Nonfat (skim) milk, 1% milk, and low-fat almond, cashew, or soy milks fortified with calcium    ? Low-fat cheese, regular or frozen yogurt, and cottage cheese    · Meats and proteins:      ? Lean cuts of beef and pork (loin, leg, round), skinless chicken and turkey    ? Legumes, soy products, egg whites, or nuts    Limit or do not include the following in your heart healthy plan:   · Unhealthy fats and oils:      ? Whole or 2% milk, cream cheese, sour cream, or cheese    ? High-fat cuts of beef (T-bone steaks, ribs), chicken or turkey with skin, and organ meats such as liver    ? Butter, stick margarine, shortening, and cooking oils such as coconut or palm oil    · Foods and liquids high in sodium:      ? Packaged foods, such as frozen dinners, cookies, macaroni and cheese, and cereals with more than 300 mg of sodium per serving    ? Vegetables with added sodium, such as instant potatoes, vegetables with added sauces, or regular canned vegetables    ? Cured or smoked meats, such as hot dogs, viramontes, and sausage    ? High-sodium ketchup, barbecue sauce, salad dressing, pickles, olives, soy sauce, or miso    · Foods and liquids high in sugar:      ? Candy, cake, cookies, pies, or doughnuts    ? Soft drinks (soda), sports drinks, or sweetened tea    ?  Canned or dry mixes for cakes, soups, sauces, or gravies    Other healthy heart guidelines:   · Do not smoke  Nicotine and other chemicals in cigarettes and cigars can cause lung and heart damage  Ask your healthcare provider for information if you currently smoke and need help to quit  E-cigarettes or smokeless tobacco still contain nicotine  Talk to your healthcare provider before you use these products  · Limit or do not drink alcohol as directed  Alcohol can damage your heart and raise your blood pressure  Your healthcare provider may give you specific daily and weekly limits  The general recommended limit is 1 drink a day for women 21 or older and for men 72 or older  Do not have more than 3 drinks in a day or 7 in a week  The recommended limit is 2 drinks a day for men 24to 59years of age  Do not have more than 4 drinks in a day or 14 in a week  A drink of alcohol is 12 ounces of beer, 5 ounces of wine, or 1½ ounces of liquor  · Exercise regularly  Exercise can help you maintain a healthy weight and improve your blood pressure and cholesterol levels  Regular exercise can also decrease your risk for heart problems  Ask your healthcare provider about the best exercise plan for you  Do not start an exercise program without asking your healthcare provider  Follow up with your doctor or cardiologist as directed:  Write down your questions so you remember to ask them during your visits  © Copyright [employer] 2022 Information is for End User's use only and may not be sold, redistributed or otherwise used for commercial purposes  All illustrations and images included in CareNotes® are the copyrighted property of A D A M , Inc  or [facility] Rickey Ann   The above information is an  only  It is not intended as medical advice for individual conditions or treatments  Talk to your doctor, nurse or pharmacist before following any medical regimen to see if it is safe and effective for you

## 2022-05-05 NOTE — PROGRESS NOTES
[address]    NAME: Jonathan Moctezuma  AGE: 61 y o  SEX: male  : 1965     DATE: 2022     Assessment and Plan:     Problem List Items Addressed This Visit     None      Visit Diagnoses     Annual physical exam    -  Primary    Screening for colon cancer        Hypertriglyceridemia        Relevant Orders    Comprehensive metabolic panel    Lipid panel    Fungal infection        Relevant Medications    ketoconazole (NIZORAL) 2 % cream          Immunizations and preventive care screenings were discussed with patient today  Appropriate education was printed on patient's after visit summary  Counseling:  Dental Health: discussed importance of regular tooth brushing, flossing, and dental visits  Sexual health: discussed sexually transmitted diseases, partner selection, use of condoms, avoidance of unintended pregnancy, and contraceptive alternatives  · Exercise: the importance of regular exercise/physical activity was discussed  Recommend exercise 3-5 times per week for at least 30 minutes  Hypertriglyceridemia- patients triglycerides are very elevated at 335  He states he does eat a lot of cheese  Denies a lot of other high fat foods but he I does not get exercise outside of his lots of walking at work  ASCVD risk reviewed with patient but he declines medication at this time  Counseled on diet and exercise- repeat labs in 3-6 months  Fungal infection on the left middle finger since 24years old  Did have surgery on it to remove infection when very young- offered referral to hand specialist  Wants to start with topicals  BMI Counseling: Body mass index is 28 7 kg/m²   The BMI is above normal  Nutrition recommendations include decreasing portion sizes, encouraging healthy choices of fruits and vegetables, decreasing fast food intake, consuming healthier snacks, limiting drinks that contain sugar, moderation in carbohydrate intake, increasing intake of lean protein, reducing intake of saturated and trans fat and reducing intake of cholesterol  Exercise recommendations include moderate physical activity 150 minutes/week  Rationale for BMI follow-up plan is due to patient being overweight or obese  Depression Screening and Follow-up Plan: Patient was screened for depression during today's encounter  They screened negative with a PHQ-2 score of 0  Return in about 1 year (around 5/5/2023) for Annual physical      Chief Complaint:     Chief Complaint   Patient presents with    Physical Exam     annual       History of Present Illness:     Adult Annual Physical   Patient here for a comprehensive physical exam  The patient reports no problems  Diet and Physical Activity  · Diet/Nutrition: well balanced diet, frequent junk food, consuming 3-5 servings of fruits/vegetables daily and adequate fiber intake  · Exercise: walking  Depression Screening  PHQ-2/9 Depression Screening    Little interest or pleasure in doing things: 0 - not at all  Feeling down, depressed, or hopeless: 0 - not at all  PHQ-2 Score: 0  PHQ-2 Interpretation: Negative depression screen       General Health  · Sleep: sleeps well and gets 7-8 hours of sleep on average  · Hearing: normal - bilateral   · Vision: goes for regular eye exams  · Dental: regular dental visits, brushes teeth twice daily and flosses teeth occasionally   Health  · Symptoms include: none     Review of Systems:     Review of Systems   Constitutional: Negative for activity change, appetite change, diaphoresis, fatigue, fever and unexpected weight change  HENT: Negative for congestion, mouth sores, rhinorrhea, sinus pain, trouble swallowing and voice change  Eyes: Negative for photophobia and visual disturbance  Respiratory: Negative for apnea, cough, chest tightness, shortness of breath and wheezing      Cardiovascular: Negative for chest pain, palpitations and leg swelling  Gastrointestinal: Negative for abdominal distention, abdominal pain, blood in stool, constipation, diarrhea, nausea and vomiting  Endocrine: Negative for cold intolerance, heat intolerance, polydipsia, polyphagia and polyuria  Genitourinary: Negative for decreased urine volume, difficulty urinating, frequency and urgency  Musculoskeletal: Negative for arthralgias, myalgias, neck pain and neck stiffness  Skin: Negative for color change, rash and wound  Neurological: Negative for dizziness, weakness, light-headedness, numbness and headaches  Hematological: Negative for adenopathy  Does not bruise/bleed easily  Psychiatric/Behavioral: Negative for self-injury, sleep disturbance and suicidal ideas  The patient is not nervous/anxious  Past Medical History:     No past medical history on file  Past Surgical History:     Past Surgical History:   Procedure Laterality Date    FINGER SURGERY Right     Amputation of index finger with neurectomy (each), distal amputation       Family History:     Family History   Problem Relation Age of Onset    No Known Problems Mother     Coronary artery disease Father     Kidney failure Father     Heart attack Father     Diabetes type II Father     Heart attack Sister       Social History:     Social History     Socioeconomic History    Marital status: /Civil Union     Spouse name: None    Number of children: 2    Years of education: None    Highest education level: None   Occupational History    Occupation:      Tobacco Use    Smoking status: Never Smoker    Smokeless tobacco: Former User   Vaping Use    Vaping Use: Never used   Substance and Sexual Activity    Alcohol use: Not Currently     Comment: once per month    Drug use: No    Sexual activity: None   Other Topics Concern    None   Social History Narrative    None     Social Determinants of Health     Financial Resource Strain: Not on file   Food Insecurity: Not on file   Transportation Needs: Not on file   Physical Activity: Not on file   Stress: Not on file   Social Connections: Not on file   Intimate Partner Violence: Not on file   Housing Stability: Not on file      Current Medications:     Current Outpatient Medications   Medication Sig Dispense Refill    ketoconazole (NIZORAL) 2 % cream Apply topically daily 15 g 0     Current Facility-Administered Medications   Medication Dose Route Frequency Provider Last Rate Last Admin    triamcinolone acetonide (KENALOG-40) 40 mg/mL injection 40 mg  40 mg Intramuscular Once           Allergies:     No Known Allergies   Physical Exam:     /80 (BP Location: Left arm, Patient Position: Sitting)   Pulse 91   Temp 98 9 °F (37 2 °C)   Ht 5' 9" (1 753 m)   Wt 88 2 kg (194 lb 6 oz)   SpO2 95%   BMI 28 70 kg/m²     Physical Exam  Constitutional:       General: He is not in acute distress  Appearance: Normal appearance  He is not ill-appearing or toxic-appearing  HENT:      Head: Normocephalic and atraumatic  Right Ear: Tympanic membrane, ear canal and external ear normal  There is no impacted cerumen  Left Ear: Tympanic membrane, ear canal and external ear normal  There is no impacted cerumen  Nose: Nose normal  No congestion or rhinorrhea  Mouth/Throat:      Mouth: Mucous membranes are moist       Pharynx: Oropharynx is clear  No oropharyngeal exudate or posterior oropharyngeal erythema  Eyes:      General: No scleral icterus  Right eye: No discharge  Left eye: No discharge  Conjunctiva/sclera: Conjunctivae normal       Pupils: Pupils are equal, round, and reactive to light  Cardiovascular:      Rate and Rhythm: Normal rate and regular rhythm  Pulses: Normal pulses  Heart sounds: Normal heart sounds  No murmur heard  No friction rub  No gallop  Pulmonary:      Effort: Pulmonary effort is normal  No respiratory distress        Breath sounds: Normal breath sounds  No stridor  No wheezing, rhonchi or rales  Abdominal:      General: Abdomen is flat  Bowel sounds are normal  There is no distension  Palpations: Abdomen is soft  There is no mass  Tenderness: There is no abdominal tenderness  Musculoskeletal:         General: No swelling, tenderness, deformity or signs of injury  Normal range of motion  Cervical back: Normal range of motion and neck supple  No rigidity  No muscular tenderness  Comments: Left middle finger has a scaly dark appearance around the finger nail  Lymphadenopathy:      Cervical: No cervical adenopathy  Skin:     General: Skin is warm and dry  Capillary Refill: Capillary refill takes less than 2 seconds  Coloration: Skin is not jaundiced or pale  Findings: No bruising or lesion  Neurological:      General: No focal deficit present  Mental Status: He is alert and oriented to person, place, and time  Mental status is at baseline  Sensory: No sensory deficit  Motor: No weakness  Gait: Gait normal    Psychiatric:         Mood and Affect: Mood normal          Behavior: Behavior normal          Thought Content:  Thought content normal          Judgment: Judgment normal           [facility], [address]

## 2022-05-13 NOTE — TELEPHONE ENCOUNTER
Upon review of the In Basket request we were able to locate, review, and update the patient chart as requested for CRC: Primo  Any additional questions or concerns should be emailed to the Practice Liaisons via [email or web address] email, please do not reply via In Basket      Thank you  Kevin Rees

## 2022-06-08 DIAGNOSIS — B49 FUNGAL INFECTION: ICD-10-CM

## 2022-06-09 RX ORDER — KETOCONAZOLE 20 MG/G
CREAM TOPICAL DAILY
Qty: 15 G | Refills: 0 | Status: SHIPPED | OUTPATIENT
Start: 2022-06-09 | End: 2022-06-17 | Stop reason: SDUPTHER

## 2022-06-15 DIAGNOSIS — R53.83 OTHER FATIGUE: ICD-10-CM

## 2022-06-17 DIAGNOSIS — B49 FUNGAL INFECTION: ICD-10-CM

## 2022-06-20 RX ORDER — KETOCONAZOLE 20 MG/G
CREAM TOPICAL DAILY
Qty: 15 G | Refills: 1 | Status: SHIPPED | OUTPATIENT
Start: 2022-06-20

## 2022-09-15 ENCOUNTER — VBI (OUTPATIENT)
Dept: ADMINISTRATIVE | Facility: OTHER | Age: 57
End: 2022-09-15

## 2022-10-12 ENCOUNTER — OFFICE VISIT (OUTPATIENT)
Dept: URGENT CARE | Facility: CLINIC | Age: 57
End: 2022-10-12
Payer: COMMERCIAL

## 2022-10-12 VITALS
HEIGHT: 69 IN | SYSTOLIC BLOOD PRESSURE: 119 MMHG | DIASTOLIC BLOOD PRESSURE: 83 MMHG | TEMPERATURE: 98.1 F | HEART RATE: 75 BPM | WEIGHT: 190 LBS | BODY MASS INDEX: 28.14 KG/M2 | OXYGEN SATURATION: 96 % | RESPIRATION RATE: 18 BRPM

## 2022-10-12 DIAGNOSIS — J06.9 ACUTE URI: Primary | ICD-10-CM

## 2022-10-12 PROCEDURE — 99213 OFFICE O/P EST LOW 20 MIN: CPT | Performed by: PHYSICIAN ASSISTANT

## 2022-10-12 PROCEDURE — U0003 INFECTIOUS AGENT DETECTION BY NUCLEIC ACID (DNA OR RNA); SEVERE ACUTE RESPIRATORY SYNDROME CORONAVIRUS 2 (SARS-COV-2) (CORONAVIRUS DISEASE [COVID-19]), AMPLIFIED PROBE TECHNIQUE, MAKING USE OF HIGH THROUGHPUT TECHNOLOGIES AS DESCRIBED BY CMS-2020-01-R: HCPCS | Performed by: PHYSICIAN ASSISTANT

## 2022-10-12 PROCEDURE — U0005 INFEC AGEN DETEC AMPLI PROBE: HCPCS | Performed by: PHYSICIAN ASSISTANT

## 2022-10-12 RX ORDER — ALBUTEROL SULFATE 90 UG/1
2 AEROSOL, METERED RESPIRATORY (INHALATION) EVERY 6 HOURS PRN
Qty: 8.5 G | Refills: 0 | Status: SHIPPED | OUTPATIENT
Start: 2022-10-12

## 2022-10-12 RX ORDER — PREDNISONE 10 MG/1
40 TABLET ORAL DAILY
Qty: 20 TABLET | Refills: 0 | Status: SHIPPED | OUTPATIENT
Start: 2022-10-12 | End: 2022-10-17

## 2022-10-12 NOTE — PROGRESS NOTES
[employer]        NAME: Argelia Hong is a 61 y o  male  : 1965    MRN: [id number]  DATE: 2022  TIME: 9:53 AM    Assessment and Plan   Acute URI [J06 9]  1  Acute URI  COVID Only -Office Collect    predniSONE 10 mg tablet    albuterol (ProAir HFA) 90 mcg/act inhaler         Patient Instructions     Patient Instructions     Hydration and rest  Mucinex otc  Day/nighttime cough medicine  albuterol  prednisone  Covid pcr  Follow up with Primary Care Physician  Return to clinic or go to the nearest Emergency Department with new or worsening symptoms as discussed  Acute Bronchitis   WHAT YOU NEED TO KNOW:   Acute bronchitis is swelling and irritation in your lungs  It is usually caused by a virus and most often happens in the winter  Bronchitis may also be caused by bacteria or by a chemical irritant, such as smoke  DISCHARGE INSTRUCTIONS:   Return to the emergency department if:   · You cough up blood  · Your lips or fingernails turn blue  · You feel like you are not getting enough air when you breathe  Call your doctor if:   · Your symptoms do not go away or get worse, even after treatment  · Your cough does not get better within 4 weeks  · You have questions or concerns about your condition or care  Medicines: You may  need any of the following:  · Cough suppressants  decrease your urge to cough  · Decongestants  help loosen mucus in your lungs and make it easier to cough up  This can help you breathe easier  · Inhalers  may be given  Your healthcare provider may give you one or more inhalers to help you breathe easier and cough less  An inhaler gives your medicine to open your airways  Ask your healthcare provider to show you how to use your inhaler correctly  · Antibiotics  may be given for up to 5 days if your bronchitis is caused by bacteria  · Acetaminophen  decreases pain and fever  It is available without a doctor's order   Ask how much to take and how often to take it  Follow directions  Read the labels of all other medicines you are using to see if they also contain acetaminophen, or ask your doctor or pharmacist  Acetaminophen can cause liver damage if not taken correctly  Do not use more than 4 grams (4,000 milligrams) total of acetaminophen in one day  · NSAIDs  help decrease swelling and pain or fever  This medicine is available with or without a doctor's order  NSAIDs can cause stomach bleeding or kidney problems in certain people  If you take blood thinner medicine, always ask your healthcare provider if NSAIDs are safe for you  Always read the medicine label and follow directions  · Take your medicine as directed  Contact your healthcare provider if you think your medicine is not helping or if you have side effects  Tell him of her if you are allergic to any medicine  Keep a list of the medicines, vitamins, and herbs you take  Include the amounts, and when and why you take them  Bring the list or the pill bottles to follow-up visits  Carry your medicine list with you in case of an emergency  Self-care:   · Drink liquids as directed  You may need to drink more liquids than usual to stay hydrated  Ask how much liquid to drink each day and which liquids are best for you  · Use a cool mist humidifier  to increase air moisture in your home  This may make it easier for you to breathe and help decrease your cough  · Get more rest   Rest helps your body to heal  Slowly start to do more each day  Rest when you feel it is needed  · Avoid irritants in the air  Avoid chemicals, fumes, and dust  Wear a face mask if you must work around dust or fumes  Stay inside on days when air pollution levels are high  If you have allergies, stay inside when pollen counts are high  Do not use aerosol products, such as spray-on deodorant, bug spray, and hair spray  · Do not smoke or be around others who are smoking    Nicotine and other chemicals in cigarettes and cigars can cause lung damage  Ask your healthcare provider for information if you currently smoke and need help to quit  E-cigarettes or smokeless tobacco still contain nicotine  Talk to your healthcare provider before you use these products  Prevent acute bronchitis:       1  Ask about vaccines you may need  Get a flu vaccine each year as soon as recommended, usually in September or October  Ask your healthcare provider if you should also get a pneumonia or COVID-19 vaccine  Your healthcare provider can tell you if you should also get other vaccines, and when to get them  2  Prevent the spread of germs  You can decrease your risk for acute bronchitis and other illnesses by doing the following:     ? Wash your hands often with soap and water  Carry germ-killing hand lotion or gel with you  You can use the lotion or gel to clean your hands when soap and water are not available  ? Do not touch your eyes, nose, or mouth unless you have washed your hands first     ? Always cover your mouth when you cough to prevent the spread of germs  It is best to cough into a tissue or your shirt sleeve instead of into your hand  Ask those around you to cover their mouths when they cough  ? Try to avoid people who have a cold or the flu  If you are sick, stay away from others as much as possible  Follow up with your doctor as directed:  Write down questions you have so you will remember to ask them during your follow-up visits  © Copyright [employer] 2022 Information is for End User's use only and may not be sold, redistributed or otherwise used for commercial purposes  All illustrations and images included in CareNotes® are the copyrighted property of A D [employer] M [employer] Inc  or Rainer Carroll  The above information is an  only  It is not intended as medical advice for individual conditions or treatments   Talk to your doctor, nurse or pharmacist before following any medical regimen to see if it is safe and effective for you  **Portions of the record may have been created with voice recognition software  Occasional wrong word or "sound a like" substitutions may have occurred due to the inherent limitations of voice recognition software  Read the chart carefully and recognize, using context, where substitutions have occurred  **     Chief Complaint     Chief Complaint   Patient presents with   • Cold Like Symptoms     Patient c/o sinus congestion and cough x 3 days  Patient reports at home negative Covid test yesterday  History of Present Illness     50-year-old male presents to clinic with complaints of sinus drainage, cough x3 days  Reports clear mucus  Denies any shortness of breath, fever, nausea vomiting diarrhea  No known sick contacts  Took a rapid COVID test at home which was negative  Using Mucinex over-the-counter with some relief  Review of Systems     Review of Systems   Constitutional: Negative for appetite change, chills and fever  HENT: Positive for congestion, postnasal drip and sinus pain  Negative for ear discharge, ear pain, facial swelling, mouth sores, sinus pressure and sore throat  Eyes: Negative for discharge and redness  Respiratory: Positive for cough  Negative for shortness of breath  Cardiovascular: Negative for chest pain  Gastrointestinal: Negative for diarrhea, nausea and vomiting  Musculoskeletal: Negative for myalgias  Skin: Negative for rash  Neurological: Negative for dizziness and headaches           Current Medications     Current Outpatient Medications:   •  albuterol (ProAir HFA) 90 mcg/act inhaler, Inhale 2 puffs every 6 (six) hours as needed for wheezing, Disp: 8 5 g, Rfl: 0  •  predniSONE 10 mg tablet, Take 4 tablets (40 mg total) by mouth daily for 5 days, Disp: 20 tablet, Rfl: 0  •  ketoconazole (NIZORAL) 2 % cream, Apply topically daily, Disp: 15 g, Rfl: 1    Current Facility-Administered Medications:   •  triamcinolone acetonide (KENALOG-40) 40 mg/mL injection 40 mg, 40 mg, Intramuscular, Once,     Current Allergies     Allergies as of 10/12/2022   • (No Known Allergies)            The following portions of the patient's history were reviewed and updated as appropriate: allergies, current medications, past family history, past medical history, past social history, past surgical history and problem list      History reviewed  No pertinent past medical history  Past Surgical History:   Procedure Laterality Date   • FINGER SURGERY Right     Amputation of index finger with neurectomy (each), distal amputation        Family History   Problem Relation Age of Onset   • No Known Problems Mother    • Coronary artery disease Father    • Kidney failure Father    • Heart attack Father    • Diabetes type II Father    • Heart attack Sister          Medications have been verified  Objective     /83   Pulse 75   Temp 98 1 °F (36 7 °C) (Temporal)   Resp 18   Ht 5' 9" (1 753 m)   Wt 86 2 kg (190 lb)   SpO2 96%   BMI 28 06 kg/m²        Physical Exam     Physical Exam  Vitals and nursing note reviewed  Constitutional:       General: He is not in acute distress  Appearance: Normal appearance  He is not ill-appearing  HENT:      Head: Normocephalic and atraumatic  Right Ear: Tympanic membrane is erythematous  Tympanic membrane is not bulging  Left Ear: Tympanic membrane normal  Tympanic membrane is not erythematous or bulging  Nose: Congestion present  No rhinorrhea  Mouth/Throat:      Mouth: Mucous membranes are moist       Pharynx: Oropharynx is clear  Posterior oropharyngeal erythema present  No oropharyngeal exudate  Cardiovascular:      Rate and Rhythm: Normal rate and regular rhythm  Pulses: Normal pulses  Heart sounds: Normal heart sounds  Pulmonary:      Effort: Pulmonary effort is normal  No respiratory distress        Breath sounds: Normal breath sounds  No stridor  No wheezing or rales  Lymphadenopathy:      Cervical: No cervical adenopathy  Skin:     General: Skin is warm and dry  Findings: No rash  Neurological:      Mental Status: He is alert

## 2022-10-12 NOTE — PATIENT INSTRUCTIONS
Hydration and rest  Mucinex otc  Day/nighttime cough medicine  albuterol  prednisone  Covid pcr  Follow up with Primary Care Physician  Return to clinic or go to the nearest Emergency Department with new or worsening symptoms as discussed  Acute Bronchitis   WHAT YOU NEED TO KNOW:   Acute bronchitis is swelling and irritation in your lungs  It is usually caused by a virus and most often happens in the winter  Bronchitis may also be caused by bacteria or by a chemical irritant, such as smoke  DISCHARGE INSTRUCTIONS:   Return to the emergency department if:   You cough up blood  Your lips or fingernails turn blue  You feel like you are not getting enough air when you breathe  Call your doctor if:   Your symptoms do not go away or get worse, even after treatment  Your cough does not get better within 4 weeks  You have questions or concerns about your condition or care  Medicines: You may  need any of the following:  Cough suppressants  decrease your urge to cough  Decongestants  help loosen mucus in your lungs and make it easier to cough up  This can help you breathe easier  Inhalers  may be given  Your healthcare provider may give you one or more inhalers to help you breathe easier and cough less  An inhaler gives your medicine to open your airways  Ask your healthcare provider to show you how to use your inhaler correctly  Antibiotics  may be given for up to 5 days if your bronchitis is caused by bacteria  Acetaminophen  decreases pain and fever  It is available without a doctor's order  Ask how much to take and how often to take it  Follow directions  Read the labels of all other medicines you are using to see if they also contain acetaminophen, or ask your doctor or pharmacist  Acetaminophen can cause liver damage if not taken correctly  Do not use more than 4 grams (4,000 milligrams) total of acetaminophen in one day  NSAIDs  help decrease swelling and pain or fever  This medicine is available with or without a doctor's order  NSAIDs can cause stomach bleeding or kidney problems in certain people  If you take blood thinner medicine, always ask your healthcare provider if NSAIDs are safe for you  Always read the medicine label and follow directions  Take your medicine as directed  Contact your healthcare provider if you think your medicine is not helping or if you have side effects  Tell him of her if you are allergic to any medicine  Keep a list of the medicines, vitamins, and herbs you take  Include the amounts, and when and why you take them  Bring the list or the pill bottles to follow-up visits  Carry your medicine list with you in case of an emergency  Self-care:   Drink liquids as directed  You may need to drink more liquids than usual to stay hydrated  Ask how much liquid to drink each day and which liquids are best for you  Use a cool mist humidifier  to increase air moisture in your home  This may make it easier for you to breathe and help decrease your cough  Get more rest   Rest helps your body to heal  Slowly start to do more each day  Rest when you feel it is needed  Avoid irritants in the air  Avoid chemicals, fumes, and dust  Wear a face mask if you must work around dust or fumes  Stay inside on days when air pollution levels are high  If you have allergies, stay inside when pollen counts are high  Do not use aerosol products, such as spray-on deodorant, bug spray, and hair spray  Do not smoke or be around others who are smoking  Nicotine and other chemicals in cigarettes and cigars can cause lung damage  Ask your healthcare provider for information if you currently smoke and need help to quit  E-cigarettes or smokeless tobacco still contain nicotine  Talk to your healthcare provider before you use these products  Prevent acute bronchitis:       Ask about vaccines you may need    Get a flu vaccine each year as soon as recommended, usually in September or October  Ask your healthcare provider if you should also get a pneumonia or COVID-19 vaccine  Your healthcare provider can tell you if you should also get other vaccines, and when to get them  Prevent the spread of germs  You can decrease your risk for acute bronchitis and other illnesses by doing the following:     Wash your hands often with soap and water  Carry germ-killing hand lotion or gel with you  You can use the lotion or gel to clean your hands when soap and water are not available  Do not touch your eyes, nose, or mouth unless you have washed your hands first     Always cover your mouth when you cough to prevent the spread of germs  It is best to cough into a tissue or your shirt sleeve instead of into your hand  Ask those around you to cover their mouths when they cough  Try to avoid people who have a cold or the flu  If you are sick, stay away from others as much as possible  Follow up with your doctor as directed:  Write down questions you have so you will remember to ask them during your follow-up visits  © Copyright [employer] 2022 Information is for End User's use only and may not be sold, redistributed or otherwise used for commercial purposes  All illustrations and images included in CareNotes® are the copyrighted property of A D A M , Inc  or Rainer Carroll  The above information is an  only  It is not intended as medical advice for individual conditions or treatments  Talk to your doctor, nurse or pharmacist before following any medical regimen to see if it is safe and effective for you

## 2022-10-13 LAB — SARS-COV-2 RNA RESP QL NAA+PROBE: NEGATIVE

## 2023-03-21 ENCOUNTER — HOSPITAL ENCOUNTER (EMERGENCY)
Facility: HOSPITAL | Age: 58
End: 2023-03-21
Attending: STUDENT IN AN ORGANIZED HEALTH CARE EDUCATION/TRAINING PROGRAM

## 2023-03-21 ENCOUNTER — HOSPITAL ENCOUNTER (INPATIENT)
Facility: HOSPITAL | Age: 58
LOS: 2 days | Discharge: HOME | End: 2023-03-23
Attending: INTERNAL MEDICINE | Admitting: INTERNAL MEDICINE

## 2023-03-21 ENCOUNTER — APPOINTMENT (OUTPATIENT)
Dept: RADIOLOGY | Facility: CLINIC | Age: 58
End: 2023-03-21

## 2023-03-21 ENCOUNTER — OFFICE VISIT (OUTPATIENT)
Dept: URGENT CARE | Facility: CLINIC | Age: 58
End: 2023-03-21

## 2023-03-21 ENCOUNTER — APPOINTMENT (EMERGENCY)
Dept: RADIOLOGY | Facility: HOSPITAL | Age: 58
End: 2023-03-21

## 2023-03-21 VITALS
OXYGEN SATURATION: 94 % | HEIGHT: 69 IN | TEMPERATURE: 96.8 F | WEIGHT: 195.99 LBS | SYSTOLIC BLOOD PRESSURE: 147 MMHG | RESPIRATION RATE: 18 BRPM | BODY MASS INDEX: 29.03 KG/M2 | HEART RATE: 75 BPM | DIASTOLIC BLOOD PRESSURE: 89 MMHG

## 2023-03-21 VITALS
HEIGHT: 69 IN | WEIGHT: 190 LBS | OXYGEN SATURATION: 97 % | BODY MASS INDEX: 28.14 KG/M2 | DIASTOLIC BLOOD PRESSURE: 86 MMHG | RESPIRATION RATE: 18 BRPM | TEMPERATURE: 98.7 F | SYSTOLIC BLOOD PRESSURE: 139 MMHG | HEART RATE: 74 BPM

## 2023-03-21 DIAGNOSIS — I21.3 STEMI (ST ELEVATION MYOCARDIAL INFARCTION) (HCC): ICD-10-CM

## 2023-03-21 DIAGNOSIS — R07.9 CHEST PAIN, UNSPECIFIED TYPE: Primary | ICD-10-CM

## 2023-03-21 DIAGNOSIS — I25.9 CHEST PAIN DUE TO MYOCARDIAL ISCHEMIA, UNSPECIFIED ISCHEMIC CHEST PAIN TYPE: ICD-10-CM

## 2023-03-21 DIAGNOSIS — R07.9 CHEST PAIN, UNSPECIFIED TYPE: ICD-10-CM

## 2023-03-21 DIAGNOSIS — R77.8 ELEVATED TROPONIN: Primary | ICD-10-CM

## 2023-03-21 DIAGNOSIS — I21.3 STEMI (ST ELEVATION MYOCARDIAL INFARCTION) (HCC): Primary | ICD-10-CM

## 2023-03-21 PROBLEM — Z95.5 STATUS POST INSERTION OF DRUG ELUTING CORONARY ARTERY STENT: Status: ACTIVE | Noted: 2023-03-21

## 2023-03-21 LAB
2HR DELTA HS TROPONIN: 19 NG/L
4HR DELTA HS TROPONIN: 104 NG/L
ANION GAP SERPL CALCULATED.3IONS-SCNC: 9 MMOL/L (ref 4–13)
ATRIAL RATE: 62 BPM
BASOPHILS # BLD AUTO: 0.08 THOUSANDS/ÂΜL (ref 0–0.1)
BASOPHILS NFR BLD AUTO: 1 % (ref 0–1)
BUN SERPL-MCNC: 15 MG/DL (ref 5–25)
CALCIUM SERPL-MCNC: 10.3 MG/DL (ref 8.4–10.2)
CARDIAC TROPONIN I PNL SERPL HS: 178 NG/L
CARDIAC TROPONIN I PNL SERPL HS: 213 NG/L
CARDIAC TROPONIN I PNL SERPL HS: 232 NG/L
CARDIAC TROPONIN I PNL SERPL HS: 317 NG/L
CHLORIDE SERPL-SCNC: 102 MMOL/L (ref 96–108)
CO2 SERPL-SCNC: 26 MMOL/L (ref 21–32)
CREAT SERPL-MCNC: 0.99 MG/DL (ref 0.6–1.3)
D DIMER PPP FEU-MCNC: 0.32 UG/ML FEU
EOSINOPHIL # BLD AUTO: 0.12 THOUSAND/ÂΜL (ref 0–0.61)
EOSINOPHIL NFR BLD AUTO: 1 % (ref 0–6)
ERYTHROCYTE [DISTWIDTH] IN BLOOD BY AUTOMATED COUNT: 12.9 % (ref 11.6–15.1)
FLUAV RNA RESP QL NAA+PROBE: NEGATIVE
FLUBV RNA RESP QL NAA+PROBE: NEGATIVE
GFR SERPL CREATININE-BSD FRML MDRD: 83 ML/MIN/1.73SQ M
GLUCOSE SERPL-MCNC: 98 MG/DL (ref 65–140)
HCT VFR BLD AUTO: 51 % (ref 36.5–49.3)
HGB BLD-MCNC: 17.1 G/DL (ref 12–17)
IMM GRANULOCYTES # BLD AUTO: 0.03 THOUSAND/UL (ref 0–0.2)
IMM GRANULOCYTES NFR BLD AUTO: 0 % (ref 0–2)
KCT BLD-ACNC: 448 SEC (ref 89–137)
LYMPHOCYTES # BLD AUTO: 1.46 THOUSANDS/ÂΜL (ref 0.6–4.47)
LYMPHOCYTES NFR BLD AUTO: 17 % (ref 14–44)
MCH RBC QN AUTO: 28.2 PG (ref 26.8–34.3)
MCHC RBC AUTO-ENTMCNC: 33.5 G/DL (ref 31.4–37.4)
MCV RBC AUTO: 84 FL (ref 82–98)
MONOCYTES # BLD AUTO: 0.61 THOUSAND/ÂΜL (ref 0.17–1.22)
MONOCYTES NFR BLD AUTO: 7 % (ref 4–12)
NEUTROPHILS # BLD AUTO: 6.53 THOUSANDS/ÂΜL (ref 1.85–7.62)
NEUTS SEG NFR BLD AUTO: 74 % (ref 43–75)
NRBC BLD AUTO-RTO: 0 /100 WBCS
P AXIS: 45 DEGREES
PLATELET # BLD AUTO: 217 THOUSANDS/UL (ref 149–390)
PLATELET # BLD AUTO: 232 THOUSANDS/UL (ref 149–390)
PMV BLD AUTO: 9.6 FL (ref 8.9–12.7)
PMV BLD AUTO: 9.8 FL (ref 8.9–12.7)
POTASSIUM SERPL-SCNC: 3.9 MMOL/L (ref 3.5–5.3)
PR INTERVAL: 168 MS
QRS AXIS: 7 DEGREES
QRSD INTERVAL: 92 MS
QT INTERVAL: 426 MS
QTC INTERVAL: 432 MS
RBC # BLD AUTO: 6.06 MILLION/UL (ref 3.88–5.62)
RSV RNA RESP QL NAA+PROBE: NEGATIVE
SARS-COV-2 RNA RESP QL NAA+PROBE: NEGATIVE
SODIUM SERPL-SCNC: 137 MMOL/L (ref 135–147)
SPECIMEN SOURCE: ABNORMAL
T WAVE AXIS: 24 DEGREES
VENTRICULAR RATE: 62 BPM
WBC # BLD AUTO: 8.83 THOUSAND/UL (ref 4.31–10.16)

## 2023-03-21 PROCEDURE — 027034Z DILATION OF CORONARY ARTERY, ONE ARTERY WITH DRUG-ELUTING INTRALUMINAL DEVICE, PERCUTANEOUS APPROACH: ICD-10-PCS | Performed by: INTERNAL MEDICINE

## 2023-03-21 PROCEDURE — B2111ZZ FLUOROSCOPY OF MULTIPLE CORONARY ARTERIES USING LOW OSMOLAR CONTRAST: ICD-10-PCS | Performed by: INTERNAL MEDICINE

## 2023-03-21 DEVICE — EVEROLIMUS-ELUTING PLATINUM CHROMIUM CORONARY STENT SYSTEM
Type: IMPLANTABLE DEVICE | Site: CORONARY | Status: FUNCTIONAL
Brand: SYNERGY™ XD

## 2023-03-21 DEVICE — PERCLOSE™ PROSTYLE™ SUTURE-MEDIATED CLOSURE AND REPAIR SYSTEM
Type: IMPLANTABLE DEVICE | Site: CORONARY | Status: FUNCTIONAL
Brand: PERCLOSE™ PROSTYLE™

## 2023-03-21 RX ORDER — LIDOCAINE HYDROCHLORIDE 10 MG/ML
INJECTION, SOLUTION EPIDURAL; INFILTRATION; INTRACAUDAL; PERINEURAL CODE/TRAUMA/SEDATION MEDICATION
Status: DISCONTINUED | OUTPATIENT
Start: 2023-03-21 | End: 2023-03-21 | Stop reason: HOSPADM

## 2023-03-21 RX ORDER — METOPROLOL TARTRATE 50 MG/1
25 TABLET, FILM COATED ORAL EVERY 12 HOURS SCHEDULED
Status: COMPLETED | OUTPATIENT
Start: 2023-03-21 | End: 2023-03-22

## 2023-03-21 RX ORDER — HEPARIN SODIUM 1000 [USP'U]/ML
4000 INJECTION, SOLUTION INTRAVENOUS; SUBCUTANEOUS EVERY 6 HOURS PRN
Status: DISCONTINUED | OUTPATIENT
Start: 2023-03-21 | End: 2023-03-21 | Stop reason: HOSPADM

## 2023-03-21 RX ORDER — ASPIRIN 325 MG
325 TABLET ORAL ONCE
Status: DISCONTINUED | OUTPATIENT
Start: 2023-03-21 | End: 2023-03-21

## 2023-03-21 RX ORDER — ASPIRIN 81 MG/1
324 TABLET, CHEWABLE ORAL ONCE
Status: COMPLETED | OUTPATIENT
Start: 2023-03-21 | End: 2023-03-21

## 2023-03-21 RX ORDER — NITROGLYCERIN 0.4 MG/1
0.4 TABLET SUBLINGUAL
Status: DISCONTINUED | OUTPATIENT
Start: 2023-03-21 | End: 2023-03-23 | Stop reason: HOSPADM

## 2023-03-21 RX ORDER — NITROGLYCERIN 20 MG/100ML
INJECTION INTRAVENOUS CODE/TRAUMA/SEDATION MEDICATION
Status: DISCONTINUED | OUTPATIENT
Start: 2023-03-21 | End: 2023-03-21 | Stop reason: HOSPADM

## 2023-03-21 RX ORDER — HEPARIN SODIUM 1000 [USP'U]/ML
INJECTION, SOLUTION INTRAVENOUS; SUBCUTANEOUS CODE/TRAUMA/SEDATION MEDICATION
Status: DISCONTINUED | OUTPATIENT
Start: 2023-03-21 | End: 2023-03-21 | Stop reason: HOSPADM

## 2023-03-21 RX ORDER — HEPARIN SODIUM 1000 [USP'U]/ML
2000 INJECTION, SOLUTION INTRAVENOUS; SUBCUTANEOUS EVERY 6 HOURS PRN
Status: DISCONTINUED | OUTPATIENT
Start: 2023-03-21 | End: 2023-03-21 | Stop reason: HOSPADM

## 2023-03-21 RX ORDER — ASPIRIN 81 MG/1
81 TABLET, CHEWABLE ORAL DAILY
Status: DISCONTINUED | OUTPATIENT
Start: 2023-03-22 | End: 2023-03-23 | Stop reason: HOSPADM

## 2023-03-21 RX ORDER — SODIUM CHLORIDE 9 MG/ML
3 INJECTION INTRAVENOUS
Status: DISCONTINUED | OUTPATIENT
Start: 2023-03-21 | End: 2023-03-21 | Stop reason: HOSPADM

## 2023-03-21 RX ORDER — SODIUM CHLORIDE 9 MG/ML
125 INJECTION, SOLUTION INTRAVENOUS CONTINUOUS
Status: DISPENSED | OUTPATIENT
Start: 2023-03-21 | End: 2023-03-21

## 2023-03-21 RX ORDER — FENTANYL CITRATE 50 UG/ML
INJECTION, SOLUTION INTRAMUSCULAR; INTRAVENOUS CODE/TRAUMA/SEDATION MEDICATION
Status: DISCONTINUED | OUTPATIENT
Start: 2023-03-21 | End: 2023-03-21 | Stop reason: HOSPADM

## 2023-03-21 RX ORDER — HEPARIN SODIUM 10000 [USP'U]/100ML
INJECTION, SOLUTION INTRAVENOUS
Status: DISCONTINUED
Start: 2023-03-21 | End: 2023-03-21 | Stop reason: HOSPADM

## 2023-03-21 RX ORDER — MIDAZOLAM HYDROCHLORIDE 2 MG/2ML
INJECTION, SOLUTION INTRAMUSCULAR; INTRAVENOUS CODE/TRAUMA/SEDATION MEDICATION
Status: DISCONTINUED | OUTPATIENT
Start: 2023-03-21 | End: 2023-03-21 | Stop reason: HOSPADM

## 2023-03-21 RX ORDER — VERAPAMIL HCL 2.5 MG/ML
AMPUL (ML) INTRAVENOUS CODE/TRAUMA/SEDATION MEDICATION
Status: DISCONTINUED | OUTPATIENT
Start: 2023-03-21 | End: 2023-03-21 | Stop reason: HOSPADM

## 2023-03-21 RX ORDER — HEPARIN SODIUM 5000 [USP'U]/ML
5000 INJECTION, SOLUTION INTRAVENOUS; SUBCUTANEOUS EVERY 8 HOURS SCHEDULED
Status: DISCONTINUED | OUTPATIENT
Start: 2023-03-21 | End: 2023-03-23 | Stop reason: HOSPADM

## 2023-03-21 RX ORDER — ONDANSETRON 2 MG/ML
4 INJECTION INTRAMUSCULAR; INTRAVENOUS EVERY 6 HOURS PRN
Status: DISCONTINUED | OUTPATIENT
Start: 2023-03-21 | End: 2023-03-23 | Stop reason: HOSPADM

## 2023-03-21 RX ORDER — ACETAMINOPHEN 325 MG/1
650 TABLET ORAL EVERY 4 HOURS PRN
Status: DISCONTINUED | OUTPATIENT
Start: 2023-03-21 | End: 2023-03-23 | Stop reason: HOSPADM

## 2023-03-21 RX ORDER — HEPARIN SODIUM 1000 [USP'U]/ML
4000 INJECTION, SOLUTION INTRAVENOUS; SUBCUTANEOUS ONCE
Status: COMPLETED | OUTPATIENT
Start: 2023-03-21 | End: 2023-03-21

## 2023-03-21 RX ORDER — HEPARIN SODIUM 10000 [USP'U]/100ML
3-20 INJECTION, SOLUTION INTRAVENOUS
Status: DISCONTINUED | OUTPATIENT
Start: 2023-03-21 | End: 2023-03-21 | Stop reason: HOSPADM

## 2023-03-21 RX ORDER — ATORVASTATIN CALCIUM 80 MG/1
80 TABLET, FILM COATED ORAL EVERY EVENING
Status: DISCONTINUED | OUTPATIENT
Start: 2023-03-21 | End: 2023-03-23 | Stop reason: HOSPADM

## 2023-03-21 RX ADMIN — TICAGRELOR 180 MG: 90 TABLET ORAL at 12:21

## 2023-03-21 RX ADMIN — HEPARIN SODIUM 5000 UNITS: 5000 INJECTION INTRAVENOUS; SUBCUTANEOUS at 21:20

## 2023-03-21 RX ADMIN — SODIUM CHLORIDE 125 ML/HR: 0.9 INJECTION, SOLUTION INTRAVENOUS at 15:20

## 2023-03-21 RX ADMIN — METOPROLOL TARTRATE 25 MG: 50 TABLET ORAL at 15:55

## 2023-03-21 RX ADMIN — ATORVASTATIN CALCIUM 80 MG: 80 TABLET, FILM COATED ORAL at 18:11

## 2023-03-21 RX ADMIN — HEPARIN SODIUM 4000 UNITS: 1000 INJECTION INTRAVENOUS; SUBCUTANEOUS at 13:05

## 2023-03-21 RX ADMIN — TICAGRELOR 90 MG: 90 TABLET ORAL at 23:12

## 2023-03-21 RX ADMIN — ASPIRIN 81 MG CHEWABLE TABLET 324 MG: 81 TABLET CHEWABLE at 12:21

## 2023-03-21 RX ADMIN — METOPROLOL TARTRATE 25 MG: 50 TABLET ORAL at 21:21

## 2023-03-21 NOTE — EMTALA/ACUTE CARE TRANSFER
[address] [facility] [address]  Dept: [phone]      EMTALA TRANSFER CONSENT    NAME Clint Wong                                         1965                              MRN [id number]    I have been informed of my rights regarding examination, treatment, and transfer   by Dr Anu Bedoya MD    Benefits:      Risks:        Transfer Request   I acknowledge that my medical condition has been evaluated and explained to me by the emergency department physician or other qualified medical person and/or my attending physician who has recommended and offered to me further medical examination and treatment  I understand the Hospital's obligation with respect to the treatment and stabilization of my emergency medical condition  I nevertheless request to be transferred  I release the Hospital, the doctor, and any other persons caring for me from all responsibility or liability for any injury or ill effects that may result from my transfer and agree to accept all responsibility for the consequences of my choice to transfer, rather than receive stabilizing treatment at the Hospital  I understand that because the transfer is my request, my insurance may not provide reimbursement for the services  The Hospital will assist and direct me and my family in how to make arrangements for transfer, but the hospital is not liable for any fees charged by the transport service  In spite of this understanding, I refuse to consent to further medical examination and treatment which has been offered to me, and request transfer to    I authorize the performance of emergency medical procedures and treatments upon me in both transit and upon arrival at the receiving facility  Additionally, I authorize the release of any and all medical records to the receiving facility and request they be transported with me, if possible      I authorize the performance of emergency medical procedures and treatments upon me in both transit and upon arrival at the receiving facility  Additionally, I authorize the release of any and all medical records to the receiving facility and request they be transported with me, if possible  I understand that the safest mode of transportation during a medical emergency is an ambulance and that the Hospital advocates the use of this mode of transport  Risks of traveling to the receiving facility by car, including absence of medical control, life sustaining equipment, such as oxygen, and medical personnel has been explained to me and I fully understand them  (KARLY CORRECT BOX BELOW)  [  ]  I consent to the stated transfer and to be transported by ambulance/helicopter  [  ]  I consent to the stated transfer, but refuse transportation by ambulance and accept full responsibility for my transportation by car  I understand the risks of non-ambulance transfers and I exonerate the Hospital and its staff from any deterioration in my condition that results from this refusal     X___________________________________________    DATE  23  TIME________  Signature of patient or legally responsible individual signing on patient behalf           RELATIONSHIP TO PATIENT_________________________          Provider Certification    NAME Skinny Fairchild                                         1965                              MRN [id number]    A medical screening exam was performed on the above named patient  Based on the examination:    Condition Necessitating Transfer There were no encounter diagnoses      Patient Condition:      Reason for Transfer:      Transfer Requirements: Facility     · Space available and qualified personnel available for treatment as acknowledged by    · Agreed to accept transfer and to provide appropriate medical treatment as acknowledged by          · Appropriate medical records of the examination and treatment of the patient are provided at the time of transfer   [address] _______  · Transfer will be performed by qualified personnel from    and appropriate transfer equipment as required, including the use of necessary and appropriate life support measures  Provider Certification: I have examined the patient and explained the following risks and benefits of being transferred/refusing transfer to the patient/family:         Based on these reasonable risks and benefits to the patient and/or the unborn child(kal), and based upon the information available at the time of the patient’s examination, I certify that the medical benefits reasonably to be expected from the provision of appropriate medical treatments at another medical facility outweigh the increasing risks, if any, to the individual’s medical condition, and in the case of labor to the unborn child, from effecting the transfer      X____________________________________________ DATE 03/21/23        TIME_______      ORIGINAL - SEND TO MEDICAL RECORDS   COPY - SEND WITH PATIENT DURING TRANSFER

## 2023-03-21 NOTE — H&P
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 who developed mild chest pain on Sunday and went to urgent care EKG was normal at that time  She developed chest pain which she described as a bubble sensation across his chest during the night radiating down his right arm  He states he did not feel well this morning at work  He does note that in the past 2 to 3 weeks he has had some fatigue and some mild chest discomfort  Patient went to the emergency room at [facility] emergency room earlier today  Initial EKG was normal however during his ER visit he complained of chest pain and his EKG was normal ST elevations in II, III and AVF  MI alert was called and patient was flown by [employer] to [address] [place] [address] on campus  Patient underwent a cardiac catheterization which revealed a 95% complex stenosis in the mid RCA with ulceration  Patient underwent a PCI and drug-eluting 4 0x24mm via right femoral artery  Femoral artery was closed with Perclose  Has a severe radial loop which prohibited our right radial artery access  Has a remaining 80% stenosis and a diffusely atherosclerotic mid LAD and will schedule for a PCI in 1 week  Historical Information   No past medical history on file    Past Surgical History:   Procedure Laterality Date   • FINGER SURGERY Right     Amputation of index finger with neurectomy (each), distal amputation      Social History   Social History     Substance and Sexual Activity   Alcohol Use Not Currently    Comment: once per month     Social History     Substance and Sexual Activity   Drug Use No     Social History     Tobacco Use   Smoking Status Never   Smokeless Tobacco Former     Family History:   Family History Problem Relation Age of Onset   • No Known Problems Mother    • Coronary artery disease Father    • Kidney failure Father    • Heart attack Father    • Diabetes type II Father    • Heart attack Sister        Meds/Allergies   PTA meds:   Prior to Admission Medications   Prescriptions Last Dose Informant Patient Reported? Taking? albuterol (ProAir HFA) 90 mcg/act inhaler   No No   Sig: Inhale 2 puffs every 6 (six) hours as needed for wheezing   Patient not taking: Reported on 3/21/2023   ketoconazole (NIZORAL) 2 % cream   No No   Sig: Apply topically daily   Patient not taking: Reported on 3/21/2023      Facility-Administered Medications Last Administration Doses Remaining   triamcinolone acetonide (KENALOG-40) 40 mg/mL injection 40 mg None recorded 1        No Known Allergies    Review of Systems   Constitutional: Positive for fatigue  HENT: Negative  Respiratory: Positive for shortness of breath  Cardiovascular: Positive for chest pain  Gastrointestinal: Negative  Genitourinary: Negative  Musculoskeletal: Negative  Allergic/Immunologic: Negative  Neurological: Negative  Hematological: Negative  Psychiatric/Behavioral: Negative  Physical Exam  Constitutional:       Appearance: Normal appearance  HENT:      Head: Normocephalic and atraumatic  Mouth/Throat:      Mouth: Mucous membranes are dry  Cardiovascular:      Rate and Rhythm: Normal rate and regular rhythm  Pulses: Normal pulses  Pulmonary:      Effort: Pulmonary effort is normal       Breath sounds: Normal breath sounds  Abdominal:      General: Abdomen is flat  Palpations: Abdomen is soft  Musculoskeletal:      Right lower leg: No edema  Left lower leg: No edema  Skin:     Capillary Refill: Capillary refill takes 2 to 3 seconds  Neurological:      Mental Status: He is alert and oriented to person, place, and time           EKG: Elevation in leads II, III AVF  ECHO:pending

## 2023-03-21 NOTE — H&P (VIEW-ONLY)
H&P Exam - Cardiology   Madalyn Gonzalez 61 y o  male MRN: [id number]  Unit/Bed#: [facility] CATH LAB ROOM Encounter: [id number]     Office cardiologist:none    PCP:CARMEN Owen        Assessment/Plan   Acute STEMI    History of Present Illness   HPI:  Madalyn Gonzalez is a 61 y o  male who developed mild chest pain on Sunday and went to urgent care EKG was normal at that time  She developed chest pain which she described as a bubble sensation across his chest during the night radiating down his right arm  He states he did not feel well this morning at work  He does note that in the past 2 to 3 weeks he has had some fatigue and some mild chest discomfort  Patient went to the emergency room at [facility] emergency room earlier today  Initial EKG was normal however during his ER visit he complained of chest pain and his EKG was normal ST elevations in II, III and AVF  MI alert was called and patient was flown by [employer] to [address] [place] [address] on campus  Patient underwent a cardiac catheterization which revealed a 95% complex stenosis in the mid RCA with ulceration  Patient underwent a PCI and drug-eluting 4 0x24mm via right femoral artery  Femoral artery was closed with Perclose  Has a severe radial loop which prohibited our right radial artery access  Has a remaining 80% stenosis and a diffusely atherosclerotic mid LAD and will schedule for a PCI in 1 week  Historical Information   No past medical history on file    Past Surgical History:   Procedure Laterality Date   • FINGER SURGERY Right     Amputation of index finger with neurectomy (each), distal amputation      Social History   Social History     Substance and Sexual Activity   Alcohol Use Not Currently    Comment: once per month     Social History     Substance and Sexual Activity   Drug Use No     Social History     Tobacco Use   Smoking Status Never   Smokeless Tobacco Former     Family History:   Family History Problem Relation Age of Onset   • No Known Problems Mother    • Coronary artery disease Father    • Kidney failure Father    • Heart attack Father    • Diabetes type II Father    • Heart attack Sister        Meds/Allergies   PTA meds:   Prior to Admission Medications   Prescriptions Last Dose Informant Patient Reported? Taking? albuterol (ProAir HFA) 90 mcg/act inhaler   No No   Sig: Inhale 2 puffs every 6 (six) hours as needed for wheezing   Patient not taking: Reported on 3/21/2023   ketoconazole (NIZORAL) 2 % cream   No No   Sig: Apply topically daily   Patient not taking: Reported on 3/21/2023      Facility-Administered Medications Last Administration Doses Remaining   triamcinolone acetonide (KENALOG-40) 40 mg/mL injection 40 mg None recorded 1        No Known Allergies    Review of Systems   Constitutional: Positive for fatigue  HENT: Negative  Respiratory: Positive for shortness of breath  Cardiovascular: Positive for chest pain  Gastrointestinal: Negative  Genitourinary: Negative  Musculoskeletal: Negative  Allergic/Immunologic: Negative  Neurological: Negative  Hematological: Negative  Psychiatric/Behavioral: Negative  Physical Exam  Constitutional:       Appearance: Normal appearance  HENT:      Head: Normocephalic and atraumatic  Mouth/Throat:      Mouth: Mucous membranes are dry  Cardiovascular:      Rate and Rhythm: Normal rate and regular rhythm  Pulses: Normal pulses  Pulmonary:      Effort: Pulmonary effort is normal       Breath sounds: Normal breath sounds  Abdominal:      General: Abdomen is flat  Palpations: Abdomen is soft  Musculoskeletal:      Right lower leg: No edema  Left lower leg: No edema  Skin:     Capillary Refill: Capillary refill takes 2 to 3 seconds  Neurological:      Mental Status: He is alert and oriented to person, place, and time           EKG: Elevation in leads II, III AVF  ECHO:pending

## 2023-03-21 NOTE — Clinical Note
Report called to [facility] RN   Pt to go to room 507, bed is currently getting cleaned, pt to go to cath lab holding area

## 2023-03-21 NOTE — PROGRESS NOTES
[facility]        NAME: Ethan Majano is a 61 y o  male  : 1965    MRN: [id number]  DATE: 2023  TIME: 11:21 AM    Assessment and Plan   Chest pain, unspecified type [R07 9]  1  Chest pain, unspecified type  ECG 12 lead    XR chest pa & lateral    Transfer to other facility        Ambulatory pulse ox 94%  ekg NSR  Chest xray shows no acute cardio pulmonary disease per my read  Patient Instructions       Proceed to  ER for further evaluation, patient agreeable to plan of care  Chief Complaint     Chief Complaint   Patient presents with   • Gas     Patient c/o gas bubbles in upper chest and right arm that started   No pain otherwise  History of Present Illness       Chest Pain   This is a new problem  The current episode started yesterday  The onset quality is sudden  The problem occurs intermittently  Pain location: across his chest  The pain is mild  Quality: "feels like there are gas bubbles in my chest" intermittent sharp pain into right arm  The pain radiates to the right arm  Associated symptoms include shortness of breath (after walking up 2 flights of stairs this morning)  Pertinent negatives include no abdominal pain, back pain, cough, diaphoresis, dizziness, exertional chest pressure, fever, headaches, malaise/fatigue, nausea, numbness, palpitations, syncope, vomiting or weakness  The pain is aggravated by nothing  He has tried nothing for the symptoms  Risk factors include male gender  His family medical history is significant for early MI (brother age 39)  Patient denies chest pain at this time, last episode was this morning  Review of Systems   Review of Systems   Constitutional: Negative for chills, diaphoresis, fatigue, fever and malaise/fatigue  HENT: Negative  Respiratory: Positive for shortness of breath (after walking up 2 flights of stairs this morning)  Negative for cough, chest tightness, wheezing and stridor      Cardiovascular: normal gait and station , no tenderness or deformities present Positive for chest pain  Negative for palpitations, leg swelling and syncope  Gastrointestinal: Negative for abdominal pain, blood in stool, constipation, diarrhea, nausea and vomiting  Genitourinary: Negative for decreased urine volume, difficulty urinating, dysuria, flank pain, frequency, hematuria and urgency  Musculoskeletal: Negative for arthralgias, back pain, joint swelling, myalgias, neck pain and neck stiffness  Skin: Negative for rash  Neurological: Negative for dizziness, syncope, weakness, light-headedness, numbness and headaches  Current Medications       Current Facility-Administered Medications:   •  triamcinolone acetonide (KENALOG-40) 40 mg/mL injection 40 mg, 40 mg, Intramuscular, Once,     Current Outpatient Medications:   •  albuterol (ProAir HFA) 90 mcg/act inhaler, Inhale 2 puffs every 6 (six) hours as needed for wheezing (Patient not taking: Reported on 3/21/2023), Disp: 8 5 g, Rfl: 0  •  ketoconazole (NIZORAL) 2 % cream, Apply topically daily (Patient not taking: Reported on 3/21/2023), Disp: 15 g, Rfl: 1    Current Allergies     Allergies as of 03/21/2023   • (No Known Allergies)            The following portions of the patient's history were reviewed and updated as appropriate: allergies, current medications, past family history, past medical history, past social history, past surgical history and problem list      History reviewed  No pertinent past medical history  Past Surgical History:   Procedure Laterality Date   • FINGER SURGERY Right     Amputation of index finger with neurectomy (each), distal amputation        Family History   Problem Relation Age of Onset   • No Known Problems Mother    • Coronary artery disease Father    • Kidney failure Father    • Heart attack Father    • Diabetes type II Father    • Heart attack Sister          Medications have been verified          Objective   /86   Pulse 74   Temp 98 7 °F (37 1 °C) (Temporal)   Resp 18   Ht 5' 9" (1 753 m)   Wt 86 2 kg (190 lb)   SpO2 97%   BMI 28 06 kg/m²   No LMP for male patient  Physical Exam     Physical Exam  Constitutional:       General: He is not in acute distress  Appearance: He is well-developed  He is not diaphoretic  Cardiovascular:      Rate and Rhythm: Normal rate and regular rhythm  Pulses: Normal pulses  Heart sounds: Normal heart sounds, S1 normal and S2 normal    Pulmonary:      Effort: Pulmonary effort is normal       Breath sounds: Normal breath sounds and air entry  Chest:      Chest wall: No deformity, swelling, tenderness or crepitus  Abdominal:      General: Bowel sounds are normal  There is no distension  Palpations: Abdomen is soft  Abdomen is not rigid  There is no shifting dullness or mass  Tenderness: There is no abdominal tenderness  There is no guarding or rebound  Negative signs include Ritchie's sign and McBurney's sign  Skin:     General: Skin is warm and dry  Capillary Refill: Capillary refill takes less than 2 seconds  Neurological:      Mental Status: He is alert and oriented to person, place, and time  GCS: GCS eye subscore is 4  GCS verbal subscore is 5  GCS motor subscore is 6

## 2023-03-21 NOTE — ED PROVIDER NOTES
History  Chief Complaint   Patient presents with   • Chest Pain     Pt seen at urgent care earlier c/o CP describes as "a gas bubble attack across chest during the night" and pain in R arm; felt achy at work this am; denies SOB; per pt, ambulatory pulse ox at  was 89-90% RA     HPI this is a 69-year-old male who presents to the emergency department referral of urgent care for approximately 24 hours of chest pain across the anterior chest wall patient reports as a bubble sensation with occasional radiation into the right arm  States while at urgent care he was noted to have an ambulatory pulse ox of 89-90% on room air and was subsequently told to come to the emergency department  Here in the emergency department he is without complaint states chest pain is completely resolved however he does tire easily and does have some dyspnea on exertion  Prior to Admission Medications   Prescriptions Last Dose Informant Patient Reported? Taking? albuterol (ProAir HFA) 90 mcg/act inhaler   No No   Sig: Inhale 2 puffs every 6 (six) hours as needed for wheezing   Patient not taking: Reported on 3/21/2023   ketoconazole (NIZORAL) 2 % cream   No No   Sig: Apply topically daily   Patient not taking: Reported on 3/21/2023      Facility-Administered Medications Last Administration Doses Remaining   triamcinolone acetonide (KENALOG-40) 40 mg/mL injection 40 mg None recorded 1          History reviewed  No pertinent past medical history  Past Surgical History:   Procedure Laterality Date   • FINGER SURGERY Right     Amputation of index finger with neurectomy (each), distal amputation        Family History   Problem Relation Age of Onset   • No Known Problems Mother    • Coronary artery disease Father    • Kidney failure Father    • Heart attack Father    • Diabetes type II Father    • Heart attack Sister      I have reviewed and agree with the history as documented      E-Cigarette/Vaping   • E-Cigarette Use Never User E-Cigarette/Vaping Substances     Social History     Tobacco Use   • Smoking status: Never   • Smokeless tobacco: Former   Vaping Use   • Vaping Use: Never used   Substance Use Topics   • Alcohol use: Not Currently     Comment: once per month   • Drug use: No       Review of Systems   Constitutional: Negative for activity change, appetite change, chills, fatigue and fever  HENT: Negative for congestion, dental problem, drooling, ear discharge, ear pain, facial swelling, postnasal drip, rhinorrhea and sinus pain  Eyes: Negative for photophobia, pain, discharge and itching  Respiratory: Negative for apnea, cough, chest tightness and shortness of breath  Cardiovascular: Negative for chest pain and leg swelling  Gastrointestinal: Negative for abdominal distention, abdominal pain, anal bleeding, constipation, diarrhea and nausea  Endocrine: Negative for cold intolerance, heat intolerance and polydipsia  Genitourinary: Negative for difficulty urinating  Musculoskeletal: Negative for arthralgias, gait problem, joint swelling and myalgias  Skin: Negative for color change and pallor  Allergic/Immunologic: Negative for immunocompromised state  Neurological: Negative for dizziness, seizures, facial asymmetry, weakness, light-headedness, numbness and headaches  Psychiatric/Behavioral: Negative for agitation, behavioral problems, confusion, decreased concentration and dysphoric mood  All other systems reviewed and are negative  Physical Exam  Physical Exam  Constitutional:       Appearance: He is well-developed  HENT:      Head: Normocephalic  Eyes:      Pupils: Pupils are equal, round, and reactive to light  Cardiovascular:      Rate and Rhythm: Normal rate and regular rhythm  Pulmonary:      Effort: Pulmonary effort is normal       Breath sounds: Normal breath sounds  No decreased breath sounds, wheezing or rhonchi     Abdominal:      General: Bowel sounds are normal  Palpations: Abdomen is soft  Musculoskeletal:         General: Normal range of motion  Cervical back: Normal range of motion and neck supple  Skin:     General: Skin is warm           Vital Signs  ED Triage Vitals   Temperature Pulse Respirations Blood Pressure SpO2   03/21/23 1121 03/21/23 1120 03/21/23 1121 03/21/23 1120 03/21/23 1120   (!) 96 8 °F (36 °C) 66 20 (!) 177/96 97 %      Temp src Heart Rate Source Patient Position - Orthostatic VS BP Location FiO2 (%)   -- 03/21/23 1120 03/21/23 1120 03/21/23 1120 --    Monitor Lying Left arm       Pain Score       03/21/23 1120       1           Vitals:    03/21/23 1120 03/21/23 1224 03/21/23 1230   BP: (!) 177/96 145/85 144/92   Pulse: 66 68 81   Patient Position - Orthostatic VS: Lying           Visual Acuity      ED Medications  Medications   sodium chloride (PF) 0 9 % injection 3 mL (has no administration in time range)   heparin (porcine) 28733-3 45 UT/250ML-% infusion (premix) **ADS Override Pull** (has no administration in time range)   heparin (porcine) injection 4,000 Units (has no administration in time range)   heparin (porcine) 25,000 units in 0 45% NaCl 250 mL infusion (premix) (has no administration in time range)   heparin (porcine) injection 4,000 Units (has no administration in time range)   heparin (porcine) injection 2,000 Units (has no administration in time range)   ticagrelor (BRILINTA) tablet 180 mg (180 mg Oral Given 3/21/23 1221)   aspirin chewable tablet 324 mg (324 mg Oral Given 3/21/23 1221)       Diagnostic Studies  Results Reviewed     Procedure Component Value Units Date/Time    APTT six (6) hours after Heparin bolus/drip initiation or dosing change [[id number]]     Lab Status: No result Specimen: Blood     APTT six (6) hours after Heparin bolus/drip initiation or dosing change [[id number]]     Lab Status: No result Specimen: Blood     FLU/RSV/COVID - if FLU/RSV clinically relevant [[id number]]  (Normal) Collected: 03/21/23 6917 Lab Status: Final result Specimen: Nares from Nose Updated: 03/21/23 1215     SARS-CoV-2 Negative     INFLUENZA A PCR Negative     INFLUENZA B PCR Negative     RSV PCR Negative    Narrative:      FOR PEDIATRIC PATIENTS - copy/paste COVID Guidelines URL to browser: [email or web address]    SARS-CoV-2 assay is a Nucleic Acid Amplification assay intended for the  qualitative detection of nucleic acid from SARS-CoV-2 in nasopharyngeal  swabs  Results are for the presumptive identification of SARS-CoV-2 RNA  Positive results are indicative of infection with SARS-CoV-2, the virus  causing COVID-19, but do not rule out bacterial infection or co-infection  with other viruses  Laboratories within the [place] and its  territories are required to report all positive results to the appropriate  public health authorities  Negative results do not preclude SARS-CoV-2  infection and should not be used as the sole basis for treatment or other  patient management decisions  Negative results must be combined with  clinical observations, patient history, and epidemiological information  This test has not been FDA cleared or approved  This test has been authorized by FDA under an Emergency Use Authorization  (EUA)  This test is only authorized for the duration of time the  declaration that circumstances exist justifying the authorization of the  emergency use of an in vitro diagnostic tests for detection of SARS-CoV-2  virus and/or diagnosis of COVID-19 infection under section 564(b)(1) of  the Act, 21 U  S C  588JBX-2(T)(0), unless the authorization is terminated  or revoked sooner  The test has been validated but independent review by FDA  and CLIA is pending  Test performed using [email or web address] GeneXpert: This RT-PCR assay targets N2,  a region unique to SARS-CoV-2   A conserved region in the E-gene was chosen  for pan-Sarbecovirus detection which includes SARS-CoV-2  According to CMS-2020-01-R, this platform meets the definition of high-throughput technology  HS Troponin I 4hr [[id number]]     Lab Status: No result Specimen: Blood     HS Troponin 0hr (reflex protocol) [[id number]]  (Abnormal) Collected: 03/21/23 1131    Lab Status: Final result Specimen: Blood from Arm, Right Updated: 03/21/23 1200     hs TnI 0hr 178 ng/L     HS Troponin I 2hr [[id number]]     Lab Status: No result Specimen: Blood     Basic metabolic panel [[id number]]  (Abnormal) Collected: 03/21/23 1131    Lab Status: Final result Specimen: Blood from Arm, Right Updated: 03/21/23 1152     Sodium 137 mmol/L      Potassium 3 9 mmol/L      Chloride 102 mmol/L      CO2 26 mmol/L      ANION GAP 9 mmol/L      BUN 15 mg/dL      Creatinine 0 99 mg/dL      Glucose 98 mg/dL      Calcium 10 3 mg/dL      eGFR 83 ml/min/1 73sq m     Narrative:      [facility] guidelines for Chronic Kidney Disease (CKD):   •  Stage 1 with normal or high GFR (GFR > 90 mL/min/1 73 square meters)  •  Stage 2 Mild CKD (GFR = 60-89 mL/min/1 73 square meters)  •  Stage 3A Moderate CKD (GFR = 45-59 mL/min/1 73 square meters)  •  Stage 3B Moderate CKD (GFR = 30-44 mL/min/1 73 square meters)  •  Stage 4 Severe CKD (GFR = 15-29 mL/min/1 73 square meters)  •  Stage 5 End Stage CKD (GFR <15 mL/min/1 73 square meters)  Note: GFR calculation is accurate only with a steady state creatinine    D-dimer, quantitative [[id number]]  (Normal) Collected: 03/21/23 1131    Lab Status: Final result Specimen: Blood from Arm, Right Updated: 03/21/23 1150     D-Dimer, Quant 0 32 ug/ml FEU     Narrative: In the evaluation for possible pulmonary embolism, in the appropriate (Well's Score of 4 or less) patient, the age adjusted d-dimer cutoff for this patient can be calculated as:    Age x 0 01 (in ug/mL) for Age-adjusted D-dimer exclusion threshold for a patient over 50 years      CBC and differential [[id number]]  (Abnormal) Collected: 03/21/23 1131    Lab Status: Final result Specimen: Blood from Arm, Right Updated: 03/21/23 1135     WBC 8 83 Thousand/uL      RBC 6 06 Million/uL      Hemoglobin 17 1 g/dL      Hematocrit 51 0 %      MCV 84 fL      MCH 28 2 pg      MCHC 33 5 g/dL      RDW 12 9 %      MPV 9 6 fL      Platelets 292 Thousands/uL      nRBC 0 /100 WBCs      Neutrophils Relative 74 %      Immat GRANS % 0 %      Lymphocytes Relative 17 %      Monocytes Relative 7 %      Eosinophils Relative 1 %      Basophils Relative 1 %      Neutrophils Absolute 6 53 Thousands/µL      Immature Grans Absolute 0 03 Thousand/uL      Lymphocytes Absolute 1 46 Thousands/µL      Monocytes Absolute 0 61 Thousand/µL      Eosinophils Absolute 0 12 Thousand/µL      Basophils Absolute 0 08 Thousands/µL                  No orders to display              Procedures  ECG 12 Lead Documentation Only    Date/Time: 3/21/2023 12:06 PM  Performed by: Seda Warner MD  Authorized by: Seda Warner MD     Indications / Diagnosis:  Shortness of breath  Patient location:  ED  Previous ECG:     Previous ECG:  Unavailable    Comparison to cardiac monitor: Yes    Interpretation:     Interpretation: normal    Rate:     ECG rate:  78    ECG rate assessment: normal    Rhythm:     Rhythm: sinus rhythm    Ectopy:     Ectopy: none    QRS:     QRS axis:  Normal  Conduction:     Conduction: normal    ST segments:     ST segments:  Abnormal    Elevation:  II, III and aVF  T waves:     T waves: normal    Comments:      STEMI alert    CriticalCare Time    Date/Time: 3/21/2023 12:32 PM  Performed by: Seda Warner MD  Authorized by: Seda Warner MD     Critical care provider statement:     Critical care time (minutes):  90    Critical care time was exclusive of:  Separately billable procedures and treating other patients and teaching time    Critical care was necessary to treat or prevent imminent or life-threatening deterioration of the following conditions:  Cardiac failure    Critical care was time spent personally by me on the following activities:  Blood draw for specimens, obtaining history from patient or surrogate, development of treatment plan with patient or surrogate, discussions with consultants, evaluation of patient's response to treatment, examination of patient, review of old charts, re-evaluation of patient's condition, ordering and review of radiographic studies, ordering and review of laboratory studies and ordering and performing treatments and interventions             ED Course  ED Course as of 03/21/23 1241   e Mar 21, 2023   1133 X-ray done at urgent care this morning reviewed by myself no acute intrathoracic abnormalities  1141 Hemoglobin(!): 17 1   1151 D-Dimer, Quant: 0 32   1212 Stemi called     1212 hs TnI 0hr(!): 178   1218 Spoke with interventional cardiology recommending transfer to Cath Lab ,  PACS aware recommending heparin Brilinta and aspirin   1228 IMPRESSION:     No acute intracranial abnormality      Findings were communicated through [address] with Jr Martins at 10:55 AM which was read and confirmed      Workstation performed: [id number]     8632 [employer] helicopter crew at bedside report given by myself  SBIRT 22yo+    Flowsheet Row Most Recent Value   SBIRT (25 yo +)    In order to provide better care to our patients, we are screening all of our patients for alcohol and drug use  Would it be okay to ask you these screening questions?  No Filed at: 03/21/2023 1144                    Medical Decision Making  Chest pain due to myocardial ischemia, unspecified ischemic chest pain type: acute illness or injury that poses a threat to life or bodily functions  Elevated troponin: acute illness or injury that poses a threat to life or bodily functions  STEMI (ST elevation myocardial infarction) [facility]): acute illness or injury that poses a threat to life or bodily functions  Amount and/or Complexity of Data Reviewed  Labs: ordered  Decision-making details documented in ED Course  Radiology: ordered and independent interpretation performed  Decision-making details documented in ED Course  ECG/medicine tests: ordered and independent interpretation performed  Decision-making details documented in ED Course  Risk  OTC drugs  Prescription drug management  Disposition  Final diagnoses:   Elevated troponin   Chest pain due to myocardial ischemia, unspecified ischemic chest pain type   STEMI (ST elevation myocardial infarction) ([facility] [address] )     Time reflects when diagnosis was documented in both MDM as applicable and the Disposition within this note     Time User Action Codes Description Comment    3/21/2023 12:34 PM Kashif Perez Add [R77 8] Elevated troponin     3/21/2023 12:34 PM Magen Ash Add [I25 9] Chest pain due to myocardial ischemia, unspecified ischemic chest pain type     3/21/2023 12:34 PM Magen Ash Add [I21 3] STEMI (ST elevation myocardial infarction) [facility])       ED Disposition     ED Disposition   Transfer to Another Facility-In Network    Condition   --    Date/Time   Tue Mar 21, 2023 12:17 PM    Comment   Ethan Majano should be transferred out to            Follow-up Information    None         Patient's Medications   Discharge Prescriptions    No medications on file       No discharge procedures on file      PDMP Review     None          ED Provider  Electronically Signed by           Farhat Souza MD  03/21/23 2787       Farhat Souza MD  03/21/23 0903

## 2023-03-22 ENCOUNTER — APPOINTMENT (INPATIENT)
Dept: NON INVASIVE DIAGNOSTICS | Facility: HOSPITAL | Age: 58
End: 2023-03-22

## 2023-03-22 ENCOUNTER — PREP FOR PROCEDURE (OUTPATIENT)
Dept: CARDIOLOGY CLINIC | Facility: CLINIC | Age: 58
End: 2023-03-22

## 2023-03-22 ENCOUNTER — TELEPHONE (OUTPATIENT)
Dept: CARDIOLOGY CLINIC | Facility: CLINIC | Age: 58
End: 2023-03-22

## 2023-03-22 DIAGNOSIS — I25.9 CHEST PAIN DUE TO MYOCARDIAL ISCHEMIA, UNSPECIFIED ISCHEMIC CHEST PAIN TYPE: Primary | ICD-10-CM

## 2023-03-22 LAB
ANION GAP SERPL CALCULATED.3IONS-SCNC: 5 MMOL/L (ref 4–13)
AORTIC ROOT: 2.8 CM
APICAL FOUR CHAMBER EJECTION FRACTION: 57 %
ATRIAL RATE: 61 BPM
ATRIAL RATE: 65 BPM
ATRIAL RATE: 78 BPM
ATRIAL RATE: 81 BPM
BUN SERPL-MCNC: 14 MG/DL (ref 5–25)
CALCIUM SERPL-MCNC: 8.7 MG/DL (ref 8.3–10.1)
CHLORIDE SERPL-SCNC: 108 MMOL/L (ref 96–108)
CHOLEST SERPL-MCNC: 181 MG/DL
CO2 SERPL-SCNC: 23 MMOL/L (ref 21–32)
CREAT SERPL-MCNC: 0.9 MG/DL (ref 0.6–1.3)
E WAVE DECELERATION TIME: 191 MS
EST. AVERAGE GLUCOSE BLD GHB EST-MCNC: 103 MG/DL
FRACTIONAL SHORTENING: 34 % (ref 28–44)
GFR SERPL CREATININE-BSD FRML MDRD: 93 ML/MIN/1.73SQ M
GLUCOSE SERPL-MCNC: 95 MG/DL (ref 65–140)
HBA1C MFR BLD: 5.2 %
HDLC SERPL-MCNC: 26 MG/DL
INTERVENTRICULAR SEPTUM IN DIASTOLE (PARASTERNAL SHORT AXIS VIEW): 0.9 CM
INTERVENTRICULAR SEPTUM: 0.9 CM (ref 0.6–1.1)
LAAS-AP2: 15 CM2
LAAS-AP4: 14.3 CM2
LDLC SERPL CALC-MCNC: 96 MG/DL (ref 0–100)
LEFT ATRIUM AREA SYSTOLE SINGLE PLANE A4C: 13.1 CM2
LEFT ATRIUM SIZE: 4 CM
LEFT INTERNAL DIMENSION IN SYSTOLE: 2.7 CM (ref 2.1–4)
LEFT VENTRICULAR INTERNAL DIMENSION IN DIASTOLE: 4.1 CM (ref 3.5–6)
LEFT VENTRICULAR POSTERIOR WALL IN END DIASTOLE: 0.9 CM
LEFT VENTRICULAR STROKE VOLUME: 46 ML
LVSV (TEICH): 46 ML
MAGNESIUM SERPL-MCNC: 2.3 MG/DL (ref 1.6–2.6)
MV E'TISSUE VEL-SEP: 6 CM/S
MV PEAK A VEL: 0.78 M/S
MV PEAK E VEL: 65 CM/S
MV STENOSIS PRESSURE HALF TIME: 55 MS
MV VALVE AREA P 1/2 METHOD: 4 CM2
P AXIS: 44 DEGREES
P AXIS: 53 DEGREES
P AXIS: 61 DEGREES
P AXIS: 64 DEGREES
POTASSIUM SERPL-SCNC: 3.7 MMOL/L (ref 3.5–5.3)
PR INTERVAL: 168 MS
PR INTERVAL: 170 MS
PR INTERVAL: 170 MS
PR INTERVAL: 174 MS
QRS AXIS: 41 DEGREES
QRS AXIS: 42 DEGREES
QRS AXIS: 44 DEGREES
QRS AXIS: 62 DEGREES
QRSD INTERVAL: 100 MS
QRSD INTERVAL: 88 MS
QRSD INTERVAL: 90 MS
QRSD INTERVAL: 94 MS
QT INTERVAL: 384 MS
QT INTERVAL: 396 MS
QT INTERVAL: 406 MS
QT INTERVAL: 414 MS
QTC INTERVAL: 416 MS
QTC INTERVAL: 422 MS
QTC INTERVAL: 446 MS
QTC INTERVAL: 451 MS
RIGHT ATRIUM AREA SYSTOLE A4C: 10.8 CM2
RIGHT VENTRICLE ID DIMENSION: 3.4 CM
SL CV LEFT ATRIUM LENGTH A2C: 4.7 CM
SL CV LV EF: 60
SL CV PED ECHO LEFT VENTRICLE DIASTOLIC VOLUME (MOD BIPLANE) 2D: 73 ML
SL CV PED ECHO LEFT VENTRICLE SYSTOLIC VOLUME (MOD BIPLANE) 2D: 27 ML
SODIUM SERPL-SCNC: 136 MMOL/L (ref 135–147)
T WAVE AXIS: 56 DEGREES
T WAVE AXIS: 63 DEGREES
T WAVE AXIS: 81 DEGREES
T WAVE AXIS: 94 DEGREES
TR MAX PG: 21 MMHG
TR PEAK VELOCITY: 2.3 M/S
TRICUSPID ANNULAR PLANE SYSTOLIC EXCURSION: 1.9 CM
TRICUSPID VALVE PEAK REGURGITATION VELOCITY: 2.31 M/S
TRIGL SERPL-MCNC: 296 MG/DL
VENTRICULAR RATE: 61 BPM
VENTRICULAR RATE: 65 BPM
VENTRICULAR RATE: 78 BPM
VENTRICULAR RATE: 81 BPM

## 2023-03-22 RX ADMIN — HEPARIN SODIUM 5000 UNITS: 5000 INJECTION INTRAVENOUS; SUBCUTANEOUS at 06:08

## 2023-03-22 RX ADMIN — METOPROLOL TARTRATE 25 MG: 50 TABLET ORAL at 21:33

## 2023-03-22 RX ADMIN — ATORVASTATIN CALCIUM 80 MG: 80 TABLET, FILM COATED ORAL at 17:12

## 2023-03-22 RX ADMIN — METOPROLOL TARTRATE 25 MG: 50 TABLET ORAL at 08:11

## 2023-03-22 RX ADMIN — ASPIRIN 81 MG CHEWABLE TABLET 81 MG: 81 TABLET CHEWABLE at 08:11

## 2023-03-22 RX ADMIN — TICAGRELOR 90 MG: 90 TABLET ORAL at 21:32

## 2023-03-22 RX ADMIN — HEPARIN SODIUM 5000 UNITS: 5000 INJECTION INTRAVENOUS; SUBCUTANEOUS at 13:24

## 2023-03-22 RX ADMIN — HEPARIN SODIUM 5000 UNITS: 5000 INJECTION INTRAVENOUS; SUBCUTANEOUS at 21:32

## 2023-03-22 RX ADMIN — TICAGRELOR 90 MG: 90 TABLET ORAL at 08:11

## 2023-03-22 NOTE — TELEPHONE ENCOUNTER
Patient referred for Cardiac PCI per Ulysses Bird, tiger Text to be perform by Dr Nhung Pace at [facility] on 03/27/2023  Patient currently admit  Ho Charles can you please check insurance for approval     Fanta/Jolanta, patient will need instructions for service, case placed, should be drag into the snapboard after patient discharge  Thank you

## 2023-03-22 NOTE — UTILIZATION REVIEW
NOTIFICATION OF INPATIENT ADMISSION   AUTHORIZATION REQUEST   SERVICING FACILITY:   [facility]  Address: [address] [place] [address], [address] [place] [address]  Tax ID: [id number]  NPI: [id number] ATTENDING PROVIDER:  Attending Name and NPI#: Ara Ramires Md [[id number]]  Address: [address] [place] [address]  Phone: [phone]   ADMISSION INFORMATION:  Place of Service: Inpatient [address]  Place of Service Code: 21  Inpatient Admission Date/Time: 3/21/23  1:47 PM  Discharge Date/Time: No discharge date for patient encounter  Admitting Diagnosis Code/Description:  STEMI (ST elevation myocardial infarction) ([facility] [address] ) [I21 3]     UTILIZATION REVIEW CONTACT:  Pb Werner Utilization   Network Utilization Review Department  Phone: [phone]  Fax: [phone]  Email: [email or web address]  Contact for approvals/pending authorizations, clinical reviews, and discharge  PHYSICIAN ADVISORY SERVICES:  Medical Necessity Denial & Iffq-mn-Efqa Review  Phone: [phone]  Fax: [phone]  Email: [email or web address]

## 2023-03-22 NOTE — TELEPHONE ENCOUNTER
Per [facility] online precert resource list and Availity, [id number], does not require authorization  Per EPIC RTE registration, this patient has outpatient benefits  This is a valid and billable code

## 2023-03-22 NOTE — CASE MANAGEMENT
Case Management Assessment & Discharge Planning Note    Patient name Madalyn Gonzalez  [facility] [id number]  : 1965 Date 3/22/2023       Current Admission Date: 3/21/2023  Current Admission Diagnosis:STEMI (ST elevation myocardial infarction) [facility])   Patient Active Problem List    Diagnosis Date Noted   • STEMI (ST elevation myocardial infarction) ([facility] [address] ) 2023   • Status post insertion of drug eluting coronary artery stent 2023   • Non-traumatic rhabdomyolysis 2022   • Elevated BP without diagnosis of hypertension 2022   • Other fatigue 2022   • BMI 27 0-27 9,adult 10/15/2019   • Pneumonia 2016   • Hypercholesteremia 05/10/2016      LOS (days): 1  Geometric Mean LOS (GMLOS) (days):   Days to GMLOS:     OBJECTIVE:    Risk of Unplanned Readmission Score: 7 36         Current admission status: Inpatient       Preferred Pharmacy:   [address] [place] [address]  , [address] [address]  Phone: [phone] Fax: [phone]    Primary Care Provider: CARMEN Adam    Primary Insurance: BLUE CROSS  Secondary Insurance:     ASSESSMENT:  [address] [place] [address] Representative - Spouse   Primary Phone: [phone] (Home)               Advance Directives  Does patient have a [address]?: No  Was patient offered paperwork?: Yes (provided)  Does patient currently have a Health Care decision maker?: Yes, please see Health Care Proxy section  Does patient have Advance Directives?: No  Was patient offered paperwork?: Yes (provided)  Primary Contact: wife, Shelli Wright [phone]         Readmission Root Cause  30 Day Readmission: No    Patient Information  Admitted from[de-identified] Facility [facility])  Mental Status: Alert  During Assessment patient was accompanied by: Not accompanied during assessment  Primary Caregiver: Self  Support Systems: Self, Spouse/significant other  What city do you live in?: Via [employer] entry access options   Select all that apply : No steps to enter home  Type of Current Residence: Bi-level  Upon entering residence, is there a bedroom on the main floor (no further steps)?: No  A bedroom is located on the following floor levels of residence (select all that apply):: 2nd Floor  Upon entering residence, is there a bathroom on the main floor (no further steps)?: No  Number of steps to 2nd floor from main floor: 7  In the last 12 months, was there a time when you were not able to pay the mortgage or rent on time?: No  In the last 12 months, how many places have you lived?: 1  In the last 12 months, was there a time when you did not have a steady place to sleep or slept in a shelter (including now)?: No  Homeless/housing insecurity resource given?: N/A  Living Arrangements: Lives w/ Spouse/significant other    Activities of Daily Living Prior to Admission  Functional Status: Independent  Completes ADLs independently?: Yes  Ambulates independently?: Yes  Does patient use assisted devices?: No  Does patient currently own DME?: No  Does patient have a history of Outpatient Therapy (PT/OT)?: No  Does patient have a history of HHC?: No         Patient Information Continued  Income Source: Employed  Does patient have prescription coverage?: Yes (copay)  Within the past 12 months, you worried that your food would run out before you got the money to buy more : Never true  Within the past 12 months, the food you bought just didn't last and you didn't have money to get more : Never true  Food insecurity resource given?: N/A  Does patient receive dialysis treatments?: No  Does patient have a history of substance abuse?: No  Does patient have a history of Mental Health Diagnosis?: No         Means of Transportation  Means of Transport to Appts[de-identified] Drives Self  In the past 12 months, has lack of transportation kept you from medical appointments or from getting medications?: No  In the past 12 months, has lack of transportation kept you from meetings, work, or from getting things needed for daily living?: No  Was application for public transport provided?: N/A        DISCHARGE DETAILS:    Discharge planning discussed with[de-identified] patient at bedside        CM contacted family/caregiver?: No- see comments (declined)    Contacts  Patient Contacts: wife, Joesph Sahni  Relationship to Patient[de-identified] Family  Contact Method: Phone  Phone Number: [phone]  Reason/Outcome: Continuity of [facility]         Is the patient interested in [employer] at discharge?: No    DME Referral Provided  Referral made for DME?: No    Other Referral/Resources/Interventions Provided:  Interventions: Prescription Price Check    Would you like to participate in our [address] service program?  : No - Declined          Additional Comments: CM met with pt to discuss CM role and obtain baseline assessment information & DCP  CM will need to check pricing on Brilinta  No other D/C needs evaluated    **addendum 1007  TC to [employer] in [address] [place] [address] for 30 day supply Brilinta is $25 00  Pt ia able to afford same     Joanie Kaufman NP Cardiology aware

## 2023-03-22 NOTE — PROGRESS NOTES
"Cardiology Progress Note - Team 1  Juany Brown 61 y o  male MRN: [id number]  Unit/Bed#: [facility] 507-01 Encounter: [id number]      Assessment/Plan:  1  Acute STEMI  - presenting ECG at 1619 K 66 elevations in leads II, III, and aVF  - life flighted to [facility] for urgent cardiac catheterization  - [facility] yesterday - 95% stenosis mRCA with RACHANA x1 via right femoral artery; residual 80% stenosis and diffusely atherosclerotic mLAD with plan for scheduled PCI in 1 week  - telemetry review - NSR, HR 60s to 70s, no significant events noted  - echo ordered and pending - no prior studies to compare  - check HgA1c for risk stratification  - continue aspirin 81 mg daily, atorvastatin 80 mg daily, Brilinta 90 mg twice daily (CM to price), Lopressor 25 mg twice daily  - continue to monitor on telemetry    2  Hyperlipidemia  - lipid panel today -/triglycerides 296/HDL 26/LDL 96  - continue high-intensity statin    Subjective:   Patient seen and examined  No significant events overnight  Patient feels well this morning  Endorses some mild chest discomfort, but overall feels improved since his procedure  Denies any acute chest pain or shortness of breath  Objective:   Vitals: Blood pressure 117/81, pulse 81, temperature 98 4 °F (36 9 °C), temperature source Oral, resp  rate 20, height 5' 9\" (1 753 m), weight 85 8 kg (189 lb 3 2 oz), SpO2 97 %  , Body mass index is 27 94 kg/m² ,   Orthostatic Blood Pressures    Flowsheet Row Most Recent Value   Blood Pressure 117/81 filed at 03/22/2023 2377   Patient Position - Orthostatic VS Lying filed at 03/22/2023 0207          Intake/Output Summary (Last 24 hours) at 3/22/2023 0815  Last data filed at 3/22/2023 0757  Gross per 24 hour   Intake 1412 33 ml   Output 1225 ml   Net 187 33 ml     Physical Exam  Constitutional:       General: He is not in acute distress  Appearance: He is obese  He is not ill-appearing  HENT:      Head: Normocephalic and atraumatic        Nose: Nose " normal       Mouth/Throat:      Mouth: Mucous membranes are moist       Pharynx: Oropharynx is clear  Eyes:      Pupils: Pupils are equal, round, and reactive to light  Cardiovascular:      Rate and Rhythm: Normal rate and regular rhythm  Pulses: Normal pulses  Heart sounds: Normal heart sounds  Pulmonary:      Effort: Pulmonary effort is normal  No respiratory distress  Breath sounds: Normal breath sounds  No wheezing or rales  Abdominal:      General: Bowel sounds are normal  There is no distension  Palpations: Abdomen is soft  Musculoskeletal:         General: Normal range of motion  Cervical back: Normal range of motion  Right lower leg: No edema  Left lower leg: No edema  Skin:     General: Skin is warm and dry  Capillary Refill: Capillary refill takes less than 2 seconds  Comments: Right groin site dressed with gauze and Tegaderm; old blood noted, soft, no hematoma; +2 right pedal pulse  Right radial site with Band-Aid; no bleeding or hematoma; +2 right radial pulse   Neurological:      General: No focal deficit present  Mental Status: He is alert and oriented to person, place, and time     Psychiatric:         Mood and Affect: Mood normal          Behavior: Behavior normal      Medications:    Current Facility-Administered Medications:   •  acetaminophen (TYLENOL) tablet 650 mg, 650 mg, Oral, Q4H PRN, CARMEN Hanna  •  aspirin chewable tablet 81 mg, 81 mg, Oral, Daily, CARMEN Hanna, 81 mg at 03/22/23 0853  •  atorvastatin (LIPITOR) tablet 80 mg, 80 mg, Oral, QPM, CARMEN Hanna, 80 mg at 03/21/23 1811  •  heparin (porcine) subcutaneous injection 5,000 Units, 5,000 Units, Subcutaneous, Q8H [facility], 5,000 Units at 03/22/23 0608 **AND** [COMPLETED] Platelet count, , , Once, CARMEN Hanna  •  metoprolol tartrate (LOPRESSOR) tablet 25 mg, 25 mg, Oral, Q12H [facility], CARMEN Hanna, 25 mg at 03/22/23 9810  •  nitroglycerin (NITROSTAT) SL tablet 0 4 mg, 0 4 mg, Sublingual, Q5 Min PRN, CARMEN Post  •  ondansetron [facility] injection 4 mg, 4 mg, Intravenous, Q6H PRN, CARMEN Post  •  ticagrelor (BRILINTA) tablet 90 mg, 90 mg, Oral, BID, CARMEN Post, 90 mg at 03/22/23 1473     Labs & Results:      Results from last 7 days   Lab Units 03/21/23  1515 03/21/23  1131   WBC Thousand/uL  --  8 83   HEMOGLOBIN g/dL  --  17 1*   HEMATOCRIT %  --  51 0*   PLATELETS Thousands/uL 217 232     Results from last 7 days   Lab Units 03/22/23  0438   TRIGLYCERIDES mg/dL 296*   HDL mg/dL 26*     Results from last 7 days   Lab Units 03/22/23  0438 03/21/23  1131   POTASSIUM mmol/L 3 7 3 9   CHLORIDE mmol/L 108 102   CO2 mmol/L 23 26   BUN mg/dL 14 15   CREATININE mg/dL 0 90 0 99   CALCIUM mg/dL 8 7 10 3*         Results from last 7 days   Lab Units 03/22/23  0438   MAGNESIUM mg/dL 2 3

## 2023-03-22 NOTE — UTILIZATION REVIEW
Initial Clinical Review    Admission: Date/Time/Statement:   Admission Orders (From admission, onward)     Ordered        03/21/23 1347  Inpatient Admission  Once                      Orders Placed This Encounter   Procedures   • Inpatient Admission     Standing Status:   Standing     Number of Occurrences:   1     Order Specific Question:   Level of Care     Answer:   Level 1 Stepdown [13]     Order Specific Question:   Estimated length of stay     Answer:   More than 2 Midnights     Order Specific Question:   Certification     Answer:   I certify that inpatient services are medically necessary for this patient for a duration of greater than two midnights  See H&P and MD Progress Notes for additional information about the patient's course of treatment  ED Arrival Information     Patient not seen in ED                     No chief complaint on file  Initial Presentation: 61 y o  male was transferred from [address] [place] [address] to [facility] via [facility] for a higher level of care  Pt initially presented to [facility] w/ anterior chest pain  Pt developed mild chest pain on Sunday and went to the Urgent care w/ normal EKG at that time  He developed chest pain which he described as a bubble sensation across his chest during the night radiating down his right arm  Reports not feeling well this morning at work  Reports some fatigue and some mild chest discomfort x 2-3 wks  In the ED, EKG was normal ST elevations in II, III and AVF  MI alert was called and patient was flown by [facility] to [facility] on campus  On arrival to [facility] aaox3, MM dry  Admitted as Inpatient for Acute STEMI  Plan: emergent cardiac cath  mon on telemetry  03/21 Cardiology Notes: Patient underwent a cardiac catheterization which revealed a 95% complex stenosis in the mid RCA with ulceration  Patient underwent a PCI and drug-eluting 4 0x24mm via right femoral artery  Femoral artery was closed with Percsajanse    Prema izquierdo severe radial loop which prohibited our right radial artery access   Has a remaining 80% stenosis and a diffusely atherosclerotic mid LAD and will schedule for a PCI in 1 week  Date: 03/22   Day 2:   Cardiology Notes: pt currently pain free and doing well  Telemetry-NSR, HR 60s to 70s, no significant events noted  Echo pending  check HgA1c  continue aspirin 81 mg daily, atorvastatin 80 mg daily, Brilinta 90 mg twice daily  Lopressor 25 mg twice daily  continue to monitor on telemetry    PE: aaox3, R groin site dressed with gauze and Tegaderm; old blood noted, soft, no hematoma; +2 right pedal pulse  R radial site with Band-Aid; no bleeding or hematoma; +2 R radial pulse     ED Triage Vitals   Temperature Pulse Respirations Blood Pressure SpO2   03/21/23 1529 03/21/23 1420 03/21/23 2245 03/21/23 1420 03/21/23 1420   98 2 °F (36 8 °C) 64 20 113/74 94 %      Temp Source Heart Rate Source Patient Position - Orthostatic VS BP Location FiO2 (%)   03/21/23 1529 03/21/23 1420 03/21/23 2245 03/21/23 2245 --   Oral Monitor Lying Left arm       Pain Score       03/21/23 1435       No Pain          Wt Readings from Last 1 Encounters:   03/22/23 88 5 kg (195 lb)     Additional Vital Signs:   Date/Time Temp Pulse Resp BP MAP (mmHg) SpO2 O2 Device Patient Position - Orthostatic VS   03/22/23 1133 -- 70 -- 121/74 -- -- -- --   03/22/23 10:59:30 98 2 °F (36 8 °C) 63 -- 121/74 90 93 % -- --   03/22/23 0900 -- -- -- -- -- 97 % None (Room air) --   03/22/23 07:21:11 98 4 °F (36 9 °C) 81 -- 117/81 -- 97 % -- --   03/22/23 02:07:11 98 1 °F (36 7 °C) 60 20 123/78 -- 94 % None (Room air) Lying   03/21/23 22:45:16 98 1 °F (36 7 °C) 55 20 116/82 -- 98 % -- Lying   03/21/23 1845 -- 64 -- 114/77 90 -- -- --   03/21/23 1745 -- 68 -- 131/77 -- -- -- --   03/21/23 1645 -- 85 -- 136/85 -- -- -- --   03/21/23 1630 -- 69 -- 129/79 -- -- -- --   03/21/23 1615 -- 84 -- 135/86 -- -- -- --   03/21/23 1600 -- 60 -- 133/80 -- -- -- --   03/21/23 1530 -- 68 -- 127/83 -- -- -- --   03/21/23 15:29:25 98 2 °F (36 8 °C) -- -- -- -- -- -- --   03/21/23 1515 -- 64 -- 124/76 -- 98 % None (Room air) --   03/21/23 1435 -- 64 -- 112/74 -- 95 % None (Room air) --       Pertinent Labs/Diagnostic Test Results:   03/21   EKG result: Normal sinus rhythm with sinus arrhythmia    EKG 2: Normal sinus rhythm with sinus arrhythmia  ST elevation consider inferior injury or acute infarct   ACUTE MI / STEMI     Cardiac cath:   •  Mid LAD lesion is 80% stenosed  •  Mid Cx lesion is 60% stenosed  •  Mid RCA lesion is 95% stenosed      Multivessel coronary disease  There was a 95% complex stenosis in the mid RCA with ulceration  This was the culprit for the patient's inferior STEMI  There is also an 80% stenosis in the diffusely atherosclerotic mid LAD  The circumflex contained noncritical plaque  Successful PCI, mid RCA, with a reduction in stenosis from 95% to 0% following placement of a 4 0x24mm RACHANA  Plan: DAPT, statin, echocardiogram, beta-blockade  Return in 1 week for staged PCI of the mid LAD      There was a severe radial loop   Access was femoral  Hemostasis was achieved with a Perclose device      EKG 3:Sinus rhythm with marked sinus arrhythmia  Possible Left atrial enlargement    Results from last 7 days   Lab Units 03/21/23  1132   SARS-COV-2  Negative     Results from last 7 days   Lab Units 03/21/23  1515 03/21/23  1131   WBC Thousand/uL  --  8 83   HEMOGLOBIN g/dL  --  17 1*   HEMATOCRIT %  --  51 0*   PLATELETS Thousands/uL 217 232   NEUTROS ABS Thousands/µL  --  6 53         Results from last 7 days   Lab Units 03/22/23  0438 03/21/23  1131   SODIUM mmol/L 136 137   POTASSIUM mmol/L 3 7 3 9   CHLORIDE mmol/L 108 102   CO2 mmol/L 23 26   ANION GAP mmol/L 5 9   BUN mg/dL 14 15   CREATININE mg/dL 0 90 0 99   EGFR ml/min/1 73sq m 93 83   CALCIUM mg/dL 8 7 10 3*   MAGNESIUM mg/dL 2 3  --              Results from last 7 days   Lab Units 03/22/23  0438 03/21/23  1131 GLUCOSE RANDOM mg/dL 95 98         Results from last 7 days   Lab Units 03/21/23  1515   HEMOGLOBIN A1C % 5 2   EAG mg/dl 103     Results from last 7 days   Lab Units 03/21/23 2001 03/21/23  1807 03/21/23  1515 03/21/23  1131   HS TNI 0HR ng/L  --   --  213* 178*   HS TNI 2HR ng/L  --  232*  --   --    HSTNI D2 ng/L  --  19  --   --    HS TNI 4HR ng/L 317*  --   --   --    HSTNI D4 ng/L 104*  --   --   --      Results from last 7 days   Lab Units 03/21/23  1131   D-DIMER QUANTITATIVE ug/ml FEU 0 32       Results from last 7 days   Lab Units 03/21/23  1132   INFLUENZA A PCR  Negative   INFLUENZA B PCR  Negative   RSV PCR  Negative     ED Treatment:   Medication Administration - No Administrations Displayed (No Start Event Found)     None        History reviewed  No pertinent past medical history  Present on Admission:  • Hypercholesteremia      Admitting Diagnosis: STEMI (ST elevation myocardial infarction) ([facility] ) [I21 3]  Age/Sex: 61 y o  male  Admission Orders:  SCD  48 hr telemetry monitoring  Continuous ST segment monitoring    Scheduled Medications:  aspirin, 81 mg, Oral, Daily  atorvastatin, 80 mg, Oral, QPM  heparin (porcine), 5,000 Units, Subcutaneous, Q8H LOLITA  metoprolol tartrate, 25 mg, Oral, Q12H [address]  ticagrelor, 90 mg, Oral, BID      Continuous IV Infusions:  sodium chloride 0 9 % infusion  Rate: 125 mL/hr Dose: 125 mL/hr  Freq: Continuous Route: IV  Indications of Use: IV Hydration  Last Dose: Stopped (03/22/23 0043)  Start: 03/21/23 1445 End: 03/21/23 2319     PRN Meds:  acetaminophen, 650 mg, Oral, Q4H PRN  nitroglycerin, 0 4 mg, Sublingual, Q5 Min PRN  ondansetron, 4 mg, Intravenous, Q6H PRN        IP CONSULT TO NUTRITION SERVICES  IP CONSULT TO CASE MANAGEMENT    Network Utilization Review Department  ATTENTION: Please call with any questions or concerns to [phone] and carefully listen to the prompts so that you are directed to the right person   All voicemails are confidential   Roderick boo requests for admission clinical reviews, approved or denied determinations and any other requests to dedicated fax number below belonging to the campus where the patient is receiving treatment   List of dedicated fax numbers for the Facilities:  [address] [place] [address] DENIALS (Administrative/Medical Necessity) [phone]   [address] (Maternity/NICU/Pediatrics) [phone]   [address] [phone]   [place] [address] [phone]   [address] [place] [address] [phone]   1554 [facility] 134 815 [facility] [phone]

## 2023-03-23 ENCOUNTER — TRANSITIONAL CARE MANAGEMENT (OUTPATIENT)
Dept: FAMILY MEDICINE CLINIC | Facility: CLINIC | Age: 58
End: 2023-03-23

## 2023-03-23 VITALS
HEIGHT: 69 IN | OXYGEN SATURATION: 92 % | DIASTOLIC BLOOD PRESSURE: 80 MMHG | WEIGHT: 186.29 LBS | SYSTOLIC BLOOD PRESSURE: 129 MMHG | RESPIRATION RATE: 20 BRPM | BODY MASS INDEX: 27.59 KG/M2 | HEART RATE: 76 BPM | TEMPERATURE: 97.9 F

## 2023-03-23 RX ORDER — ASPIRIN 81 MG/1
81 TABLET, CHEWABLE ORAL DAILY
Refills: 0
Start: 2023-03-24

## 2023-03-23 RX ORDER — NITROGLYCERIN 0.4 MG/1
0.4 TABLET SUBLINGUAL
Qty: 30 TABLET | Refills: 2 | Status: SHIPPED | OUTPATIENT
Start: 2023-03-23

## 2023-03-23 RX ORDER — ATORVASTATIN CALCIUM 80 MG/1
80 TABLET, FILM COATED ORAL EVERY EVENING
Qty: 30 TABLET | Refills: 2 | Status: SHIPPED | OUTPATIENT
Start: 2023-03-23

## 2023-03-23 RX ADMIN — HEPARIN SODIUM 5000 UNITS: 5000 INJECTION INTRAVENOUS; SUBCUTANEOUS at 06:15

## 2023-03-23 RX ADMIN — METOPROLOL TARTRATE 25 MG: 25 TABLET, FILM COATED ORAL at 09:57

## 2023-03-23 RX ADMIN — ASPIRIN 81 MG CHEWABLE TABLET 81 MG: 81 TABLET CHEWABLE at 08:06

## 2023-03-23 RX ADMIN — TICAGRELOR 90 MG: 90 TABLET ORAL at 08:06

## 2023-03-23 NOTE — TELEPHONE ENCOUNTER
Patient is discharged  Per Fito Hernandez request, I called patient and left voicemail for patient to call me back to provided instructions for his PCI scheduled for 3/27/23 @ [facility] w/Juany Weber

## 2023-03-23 NOTE — TELEPHONE ENCOUNTER
Patient scheduled for PCI on 3/27/23 at [facility] with Dr David Luo  Patient aware of all general instructions      Medication holds:   N/A    Labs to be done by:  N/A (Patient already did BMP on 3/22/23 & CBC on 3/21/23)        Thank you,  Jin Alejandro

## 2023-03-23 NOTE — DISCHARGE INSTRUCTIONS
1  Please see the post angioplasty discharge instructions  No heavy lifting, greater than 10 lbs  or strenuous activity for 5 days  Follow angioplasty discharge instructions  2 Remove band aid tomorrow  Shower and wash area (groin and wrist site) gently with soap and water- beginning tomorrow  Rinse and pat dry  Apply new water seal band aid  Repeat this process for 5 days  No powders, creams lotions or antibiotic ointments  for 5 days  No tub baths, hot tubs or swimming for 5 days  3  Call [facility] Office ([phone]) if you develop a fever, redness or drainage at your groin or wrist access site  4  No driving for 2 days    5  Do not stop aspirin or Brilinta (Ticagrelor) any reason without a cardiologist’s consent, or the stent could block up and cause a heart attack  6  Stent card and book      Patient requesting name of NP who took care of him at Urgent [address]

## 2023-03-23 NOTE — DISCHARGE SUMMARY
"Discharge Summary - Leann Oh 61 y o  male MRN: [id number]  Unit/Bed#: [facility] 145-63 Encounter: [id number]    Admission Date: 3/21/2023   Discharge Date: 3/23/2023    Disposition: Home    PCP: Dr Zeke Wynne  OP Cardiologist: N/A  Interventional cardiologist: Dr Patsy Ramos    Discharge Diagnosis: CAD s/p PCI  Secondary Diagnoses: Hyperlipidemia    Condition at Discharge: good   Consultants: N/A  Procedures: Left heart catheterization    /77   Pulse 76   Temp 97 9 °F (36 6 °C)   Resp 20   Ht 5' 9\" (1 753 m)   Wt 84 5 kg (186 lb 4 6 oz)   SpO2 92%   BMI 27 51 kg/m²      Review of Systems   Constitutional: Negative  HENT: Negative  Eyes: Negative  Cardiovascular: Negative for chest pain, dyspnea on exertion, irregular heartbeat, orthopnea, palpitations and syncope  Respiratory: Negative for cough, shortness of breath and wheezing  Endocrine: Negative  Skin: Negative  Musculoskeletal: Negative  Gastrointestinal: Negative  Genitourinary: Negative  Neurological: Negative  Psychiatric/Behavioral: Negative  Allergic/Immunologic: Negative  Physical Exam  Constitutional:       Appearance: Normal appearance  HENT:      Head: Normocephalic and atraumatic  Nose: Nose normal       Mouth/Throat:      Mouth: Mucous membranes are moist       Pharynx: Oropharynx is clear  Eyes:      Pupils: Pupils are equal, round, and reactive to light  Cardiovascular:      Rate and Rhythm: Normal rate and regular rhythm  Pulses: Normal pulses  Heart sounds: Normal heart sounds  Pulmonary:      Effort: Pulmonary effort is normal  No respiratory distress  Breath sounds: Normal breath sounds  No wheezing or rales  Comments: On RA  Abdominal:      General: Bowel sounds are normal  There is no distension  Palpations: Abdomen is soft  Musculoskeletal:         General: Normal range of motion  Cervical back: Normal range of motion        Right lower " leg: No edema  Left lower leg: No edema  Skin:     General: Skin is warm and dry  Capillary Refill: Capillary refill takes less than 2 seconds  Comments: Right radial site with Band-Aid (CDI), no bleeding/hematoma; +2 radial pulse  Right groin site with gauze and Tegaderm (CDI), soft, no bleeding/hematoma; +2 right pedal pulse   Neurological:      General: No focal deficit present  Mental Status: He is alert and oriented to person, place, and time  Psychiatric:         Mood and Affect: Mood normal          Behavior: Behavior normal      HPI and Hospital Course:    Tatianna Mayes is a 61 y o  male who developed mild chest pain on Sunday and went to urgent care EKG was normal at that time  She developed chest pain which she described as a bubble sensation across his chest during the night radiating down his right arm  He states he did not feel well this morning at work  He does note that in the past 2 to 3 weeks he has had some fatigue and some mild chest discomfort  Patient went to the emergency room at [facility] emergency room earlier today  Initial EKG was normal however during his ER visit he complained of chest pain and his EKG was normal ST elevations in II, III and AVF  MI alert was called and patient was flown by [employer] to [address] [place] [address] on campus      Patient underwent a cardiac catheterization (3/21/2023) which revealed a 95% complex stenosis in the mid RCA with ulceration  Patient underwent a PCI and drug-eluting 4 0x24mm via right femoral artery  Femoral artery was closed with Perclose  Has a severe radial loop which prohibited our right radial artery access      Has a remaining 80% stenosis and a diffusely atherosclerotic mid LAD and will schedule for a PCI in 1 week    Patient will be discharged on aspirin 81 mg daily, atorvastatin 80 mg daily, Lopressor 25 mg twice daily, and Brilinta 90 mg twice daily (confirmed to be affordable per case management)  An outpatient referral will be placed to cardiac rehab and staged PCI is scheduled for next week on 3/27/2023  Unfortunately, there is no AP or physician availability at the [facility] location for a one week follow-up, so arrangements have been made for the patient to be seen at our [address] [place] [address] office on 3/30/2023  Subsequent follow-up visits can be arranged in [facility]  Labs:   K 3 7   Mag 2 3  Cr 0 90  BUN 14    Telemetry review: NSR, HR 70s, sinus bradycardia during hours of sleep, no other significant events noted    Discharge Medications:  See after visit summary for reconciled discharge medications provided to patient and family  Pertinent Labs/diagnostics:      CBC with diff: Results from last 7 days   Lab Units 03/21/23  1515 03/21/23  1131   WBC Thousand/uL  --  8 83   HEMOGLOBIN g/dL  --  17 1*   HEMATOCRIT %  --  51 0*   MCV fL  --  84   PLATELETS Thousands/uL 217 232   MCH pg  --  28 2   MCHC g/dL  --  33 5   RDW %  --  12 9   MPV fL 9 8 9 6   NRBC AUTO /100 WBCs  --  0     CMP:  Results from last 7 days   Lab Units 03/22/23  0438 03/21/23  1131   POTASSIUM mmol/L 3 7 3 9   CHLORIDE mmol/L 108 102   CO2 mmol/L 23 26   BUN mg/dL 14 15   CREATININE mg/dL 0 90 0 99   CALCIUM mg/dL 8 7 10 3*   EGFR ml/min/1 73sq m 93 83     Lipid Profile:   No results found for: CHOL  Lab Results   Component Value Date    HDL 26 (L) 03/22/2023    HDL 27 (L) 04/30/2022     Lab Results   Component Value Date    LDLCALC 96 03/22/2023    LDLCALC 108 (H) 04/30/2022     Lab Results   Component Value Date    TRIG 296 (H) 03/22/2023    TRIG 335 (H) 04/30/2022     Results Reviewed     None        Cardiac testing:   Echo (3/22/2023) - LVEF 60%, grade 1 DD, mild to moderate TR with normal RVSP, hypokinesis of the basal inferior segment, all other segments normal    Discharge instructions/Information to patient and family:   See after visit summary for information provided to patient and family        Provisions for Follow-Up Care:  See after visit summary for information related to follow-up care and any pertinent home health orders  Planned Readmission: Yes - staged PCI on 3/27/2023    Discharge Statement   I spent 30 minutes minutes discharging the patient  This time was spent on the day of discharge  I had direct contact with the patient on the day of discharge  Additional documentation is required if more than 30 minutes were spent on discharge  ** Please Note: Dragon 360 Dictation voice to text software may have been used in the creation of this document   **

## 2023-03-24 NOTE — UTILIZATION REVIEW
NOTIFICATION OF ADMISSION DISCHARGE   This is a Notification of Discharge from [address] [place] [address]  Please be advised that this patient has been discharge from our facility  Below you will find the admission and discharge date and time including the patient’s disposition  UTILIZATION REVIEW CONTACT:  Kaleb Nava  Utilization   Network Utilization Review Department  Phone: [phone] x carefully listen to the prompts  All voicemails are confidential   Email: [email or web address]     ADMISSION INFORMATION  PRESENTATION DATE: 3/21/2023  1:10 PM  OBERVATION ADMISSION DATE:   INPATIENT ADMISSION DATE: 3/21/23  1:47 PM   DISCHARGE DATE: 3/23/2023 11:09 AM   DISPOSITION:PRA - Home    IMPORTANT INFORMATION:  Send all requests for admission clinical reviews, approved or denied determinations and any other requests to dedicated fax number below belonging to the campus where the patient is receiving treatment   List of dedicated fax numbers:  [address] DENIALS (Administrative/Medical Necessity) [phone]   [address] (Maternity/NICU/Pediatrics) [phone]   [facility] [phone]   [address] [phone]   [address] [phone]   220 [facility] [phone]   [address] [facility] [phone]   [address] [place] [address] 119 [phone]   [facility]  [phone]   4055 [facility] [phone]   412 [facility] [address] [facility] [phone]

## 2023-03-25 ENCOUNTER — APPOINTMENT (OUTPATIENT)
Dept: LAB | Facility: CLINIC | Age: 58
End: 2023-03-25

## 2023-03-25 DIAGNOSIS — E78.1 HYPERTRIGLYCERIDEMIA: ICD-10-CM

## 2023-03-25 DIAGNOSIS — R53.83 OTHER FATIGUE: ICD-10-CM

## 2023-03-25 DIAGNOSIS — I21.3 STEMI (ST ELEVATION MYOCARDIAL INFARCTION) (HCC): ICD-10-CM

## 2023-03-25 LAB
ANION GAP SERPL CALCULATED.3IONS-SCNC: 3 MMOL/L (ref 4–13)
BUN SERPL-MCNC: 20 MG/DL (ref 5–25)
CALCIUM SERPL-MCNC: 9.5 MG/DL (ref 8.3–10.1)
CHLORIDE SERPL-SCNC: 106 MMOL/L (ref 96–108)
CO2 SERPL-SCNC: 26 MMOL/L (ref 21–32)
CREAT SERPL-MCNC: 1.09 MG/DL (ref 0.6–1.3)
GFR SERPL CREATININE-BSD FRML MDRD: 74 ML/MIN/1.73SQ M
GLUCOSE P FAST SERPL-MCNC: 92 MG/DL (ref 65–99)
POTASSIUM SERPL-SCNC: 3.8 MMOL/L (ref 3.5–5.3)
SODIUM SERPL-SCNC: 135 MMOL/L (ref 135–147)

## 2023-03-27 ENCOUNTER — HOSPITAL ENCOUNTER (OUTPATIENT)
Facility: HOSPITAL | Age: 58
Setting detail: OUTPATIENT SURGERY
Discharge: HOME | End: 2023-03-28
Attending: INTERNAL MEDICINE | Admitting: INTERNAL MEDICINE

## 2023-03-27 DIAGNOSIS — Z95.5 STATUS POST PRIMARY ANGIOPLASTY WITH CORONARY STENT: Primary | ICD-10-CM

## 2023-03-27 DIAGNOSIS — I25.9 CHEST PAIN DUE TO MYOCARDIAL ISCHEMIA, UNSPECIFIED ISCHEMIC CHEST PAIN TYPE: ICD-10-CM

## 2023-03-27 PROBLEM — I25.10 CORONARY ARTERY DISEASE INVOLVING NATIVE CORONARY ARTERY: Status: ACTIVE | Noted: 2023-03-27

## 2023-03-27 LAB
ANION GAP SERPL CALCULATED.3IONS-SCNC: 3 MMOL/L (ref 4–13)
ATRIAL RATE: 60 BPM
ATRIAL RATE: 64 BPM
BUN SERPL-MCNC: 18 MG/DL (ref 5–25)
CALCIUM SERPL-MCNC: 9.2 MG/DL (ref 8.3–10.1)
CHLORIDE SERPL-SCNC: 106 MMOL/L (ref 96–108)
CO2 SERPL-SCNC: 27 MMOL/L (ref 21–32)
CREAT SERPL-MCNC: 1.11 MG/DL (ref 0.6–1.3)
FLUAV RNA RESP QL NAA+PROBE: NEGATIVE
FLUBV RNA RESP QL NAA+PROBE: NEGATIVE
GFR SERPL CREATININE-BSD FRML MDRD: 72 ML/MIN/1.73SQ M
GLUCOSE P FAST SERPL-MCNC: 103 MG/DL (ref 65–99)
GLUCOSE SERPL-MCNC: 103 MG/DL (ref 65–140)
KCT BLD-ACNC: 412 SEC (ref 89–137)
P AXIS: 45 DEGREES
P AXIS: 51 DEGREES
POTASSIUM SERPL-SCNC: 3.8 MMOL/L (ref 3.5–5.3)
PR INTERVAL: 178 MS
PR INTERVAL: 178 MS
QRS AXIS: 14 DEGREES
QRS AXIS: 29 DEGREES
QRSD INTERVAL: 92 MS
QRSD INTERVAL: 92 MS
QT INTERVAL: 426 MS
QT INTERVAL: 432 MS
QTC INTERVAL: 426 MS
QTC INTERVAL: 445 MS
RSV RNA RESP QL NAA+PROBE: NEGATIVE
SARS-COV-2 RNA RESP QL NAA+PROBE: NEGATIVE
SODIUM SERPL-SCNC: 136 MMOL/L (ref 135–147)
SPECIMEN SOURCE: ABNORMAL
T WAVE AXIS: 16 DEGREES
T WAVE AXIS: 6 DEGREES
VENTRICULAR RATE: 60 BPM
VENTRICULAR RATE: 64 BPM

## 2023-03-27 DEVICE — PERCLOSE™ PROSTYLE™ SUTURE-MEDIATED CLOSURE AND REPAIR SYSTEM
Type: IMPLANTABLE DEVICE | Site: CORONARY | Status: FUNCTIONAL
Brand: PERCLOSE™ PROSTYLE™

## 2023-03-27 DEVICE — IMPLANTABLE DEVICE
Type: IMPLANTABLE DEVICE | Site: CORONARY | Status: FUNCTIONAL
Brand: ORSIRO MISSION

## 2023-03-27 RX ORDER — HEPARIN SODIUM 1000 [USP'U]/ML
INJECTION, SOLUTION INTRAVENOUS; SUBCUTANEOUS CODE/TRAUMA/SEDATION MEDICATION
Status: DISCONTINUED | OUTPATIENT
Start: 2023-03-27 | End: 2023-03-27 | Stop reason: HOSPADM

## 2023-03-27 RX ORDER — MIDAZOLAM HYDROCHLORIDE 2 MG/2ML
INJECTION, SOLUTION INTRAMUSCULAR; INTRAVENOUS CODE/TRAUMA/SEDATION MEDICATION
Status: DISCONTINUED | OUTPATIENT
Start: 2023-03-27 | End: 2023-03-27 | Stop reason: HOSPADM

## 2023-03-27 RX ORDER — NITROGLYCERIN 0.4 MG/1
0.4 TABLET SUBLINGUAL
Status: DISCONTINUED | OUTPATIENT
Start: 2023-03-27 | End: 2023-03-28 | Stop reason: HOSPADM

## 2023-03-27 RX ORDER — ASPIRIN 81 MG/1
81 TABLET, CHEWABLE ORAL DAILY
Status: DISCONTINUED | OUTPATIENT
Start: 2023-03-28 | End: 2023-03-28 | Stop reason: HOSPADM

## 2023-03-27 RX ORDER — FENTANYL CITRATE 50 UG/ML
INJECTION, SOLUTION INTRAMUSCULAR; INTRAVENOUS CODE/TRAUMA/SEDATION MEDICATION
Status: DISCONTINUED | OUTPATIENT
Start: 2023-03-27 | End: 2023-03-27 | Stop reason: HOSPADM

## 2023-03-27 RX ORDER — ASPIRIN 81 MG/1
324 TABLET, CHEWABLE ORAL ONCE
Status: COMPLETED | OUTPATIENT
Start: 2023-03-27 | End: 2023-03-27

## 2023-03-27 RX ORDER — SODIUM CHLORIDE 9 MG/ML
125 INJECTION, SOLUTION INTRAVENOUS CONTINUOUS
Status: DISCONTINUED | OUTPATIENT
Start: 2023-03-27 | End: 2023-03-27

## 2023-03-27 RX ORDER — ATORVASTATIN CALCIUM 80 MG/1
80 TABLET, FILM COATED ORAL EVERY EVENING
Status: DISCONTINUED | OUTPATIENT
Start: 2023-03-27 | End: 2023-03-28 | Stop reason: HOSPADM

## 2023-03-27 RX ORDER — NITROGLYCERIN 20 MG/100ML
INJECTION INTRAVENOUS CODE/TRAUMA/SEDATION MEDICATION
Status: DISCONTINUED | OUTPATIENT
Start: 2023-03-27 | End: 2023-03-27 | Stop reason: HOSPADM

## 2023-03-27 RX ORDER — SODIUM CHLORIDE 9 MG/ML
150 INJECTION, SOLUTION INTRAVENOUS CONTINUOUS
Status: DISCONTINUED | OUTPATIENT
Start: 2023-03-27 | End: 2023-03-28 | Stop reason: HOSPADM

## 2023-03-27 RX ORDER — ALBUTEROL SULFATE 90 UG/1
2 AEROSOL, METERED RESPIRATORY (INHALATION) EVERY 6 HOURS PRN
Status: DISCONTINUED | OUTPATIENT
Start: 2023-03-27 | End: 2023-03-28 | Stop reason: HOSPADM

## 2023-03-27 RX ORDER — ONDANSETRON 2 MG/ML
4 INJECTION INTRAMUSCULAR; INTRAVENOUS EVERY 6 HOURS PRN
Status: DISCONTINUED | OUTPATIENT
Start: 2023-03-27 | End: 2023-03-28 | Stop reason: HOSPADM

## 2023-03-27 RX ORDER — LIDOCAINE HYDROCHLORIDE 10 MG/ML
INJECTION, SOLUTION EPIDURAL; INFILTRATION; INTRACAUDAL; PERINEURAL CODE/TRAUMA/SEDATION MEDICATION
Status: DISCONTINUED | OUTPATIENT
Start: 2023-03-27 | End: 2023-03-27 | Stop reason: HOSPADM

## 2023-03-27 RX ORDER — ACETAMINOPHEN 325 MG/1
650 TABLET ORAL EVERY 4 HOURS PRN
Status: DISCONTINUED | OUTPATIENT
Start: 2023-03-27 | End: 2023-03-28 | Stop reason: HOSPADM

## 2023-03-27 RX ADMIN — ATORVASTATIN CALCIUM 80 MG: 80 TABLET, FILM COATED ORAL at 16:43

## 2023-03-27 RX ADMIN — TICAGRELOR 90 MG: 90 TABLET ORAL at 21:27

## 2023-03-27 RX ADMIN — ASPIRIN 81 MG 324 MG: 81 TABLET ORAL at 10:12

## 2023-03-27 RX ADMIN — SODIUM CHLORIDE 150 ML/HR: 0.9 INJECTION, SOLUTION INTRAVENOUS at 14:11

## 2023-03-27 RX ADMIN — ACETAMINOPHEN 650 MG: 325 TABLET ORAL at 20:01

## 2023-03-27 RX ADMIN — METOPROLOL TARTRATE 25 MG: 25 TABLET, FILM COATED ORAL at 21:27

## 2023-03-27 RX ADMIN — SODIUM CHLORIDE 125 ML/HR: 0.9 INJECTION, SOLUTION INTRAVENOUS at 10:13

## 2023-03-27 NOTE — DISCHARGE SUMMARY
Discharge Summary - Jaden Tran 61 y o  male MRN: [id number]        Admission Date: 3/27/2023     Date of Discharge: 3/28/23    Admitting Diagnosis:  CAD staged PCI of 80% LAD    Secondary Diagnoses:  ST elevation MI on 3/21/2023 status post PCI and drug-eluting (4 0 by 24 mm RACHANA to mid RCA  Hyperlipidemia    Discharge Diagnosis: PCI and drug-eluting stent     Cardiologist: Dr Skinny Rai    PCP: Joyce Gasca, [place] Hospital Course: 26-year-old male who had inferior ST elevation on 3/21/2023  Patient was flown by [employer] from [place] [address] emergency room  At that time the patient underwent a PCI and drug-eluting stent (4 0 x 24 mm) to a 95% complex mid RCA lesion via right femoral artery  Right femoral artery was closed with Perclose  Patient has a severe radial loop which prohibited the use of the right radial artery for access  Patient was patient was electively admitted on 3/27/2023 for staged PCI of his LAD  Patient underwent a successful IVUS guided PCI to the proximal LAD with reduction of stenosis from 80% to 0 with the placement of a 3 0 x 18 [email or web address] RACHANA  Right femoral artery was utilized for arterial access and closed with Perclose  Initially the patient was going to be same-day PCI however he had postprocedural bleeding in the wife did not feel comfortable taking him home  Therefore the patient will stay overnight  Patient will be discharged home on aspirin 81 mg daily Brilinta 90 mg twice daily,  Metoprolol tartrate 25 mg twice daily and Lipitor 80 mg daily  Cardiac rehab referral will now be made since he has completed his revascularization  He will follow-up in the office on 4/3/2023 at 11:20 AM with Eustace Curling, CRNP in our Office  An appointment will be made to establish with a cardiologist in the [place] office  Currently there are no available appointments in the [place] office      VS:/76 (BP Location: Right arm)   Pulse 63   Temp 98 °F "(36 7 °C) (Oral)   Resp 20   Ht 5' 9\" (1 753 m)   Wt 80 7 kg (178 lb)   SpO2 97%   BMI 26 29 kg/m²     ROS :  All system negative  Physical exam:  AAO x3   Heart : Normal S1-S2 no murmur  Lungs clear to auscultation no wheezing rales or rhonchi  Abdomen soft nontender bowel sounds are present  Right groin is mildly ecchymotic, no hematoma no bruit  Extremities no lower leg edema peripheral pulses are +2  Procedures Performed:   Orders Placed This Encounter   Procedures   • Cardiac catheterization     Tele:NSR    Discharge instructions/Information to patient and family:   See after visit summary for information provided to patient and family  Provisions for Follow-Up Care: Laina MORALES 4/3/2023, at 11:25 PM on the brain a m  Disposition: Home      Planned Readmission: No    Discharge Statement   I spent 45 minutes discharging the patient  This time was spent on the day of discharge  I had direct contact with the patient on the day of discharge  Additional documentation is required if more than 30 minutes were spent on discharge  Discharge Medications:  Please refer to medication discharge record for current medications             ** Please Note: Fluency Direct Dictation voice to text software may have been used in the creation of this document   **                "

## 2023-03-27 NOTE — LETTER
[address] [id number]  Dept: [phone]    March 28, 2023     Patient: Juany Brown   YOB: 1965   Date of Visit: 3/27/2023       To Whom it May Concern:    Juany Brown is under my professional care  He was seen in the hospital from 3/27/2023 to 03/28/23  He may return to work on 4/10/23 without limitations  If you have any questions or concerns, please don't hesitate to call           Sincerely,          Luciana Morrow, [facility]  [phone]

## 2023-03-27 NOTE — DISCHARGE INSTR - AVS FIRST PAGE
1  Please see the post angioplasty discharge instructions  No heavy lifting, greater than 10 lbs  or strenuous activity for 5 days  Follow angioplasty discharge instructions  2 Remove band aid tomorrow  Shower and wash area- groin gently with soap and water- beginning tomorrow  Rinse and pat dry  Apply new water seal band aid  Repeat this process for 5 days  No powders, creams lotions or antibiotic ointments  for 5 days  No tub baths, hot tubs or swimming for 5 days  3  Call [place] [address] Cardiology Office ([phone]) if you develop a fever, redness or drainage at your groin access site  4  No driving for 2 days    5  Do not stop aspirin or Brilinta (Ticagrelor) any reason without a cardiologist’s consent, or the stent could block up and cause a heart attack  6  Stent card and book      7 Perclose Booklet

## 2023-03-28 VITALS
TEMPERATURE: 98 F | HEART RATE: 63 BPM | SYSTOLIC BLOOD PRESSURE: 121 MMHG | OXYGEN SATURATION: 97 % | DIASTOLIC BLOOD PRESSURE: 76 MMHG | BODY MASS INDEX: 26.36 KG/M2 | HEIGHT: 69 IN | WEIGHT: 178 LBS | RESPIRATION RATE: 20 BRPM

## 2023-03-28 LAB
ANION GAP SERPL CALCULATED.3IONS-SCNC: 4 MMOL/L (ref 4–13)
BUN SERPL-MCNC: 16 MG/DL (ref 5–25)
CALCIUM SERPL-MCNC: 8.7 MG/DL (ref 8.3–10.1)
CHLORIDE SERPL-SCNC: 109 MMOL/L (ref 96–108)
CO2 SERPL-SCNC: 24 MMOL/L (ref 21–32)
CREAT SERPL-MCNC: 1.08 MG/DL (ref 0.6–1.3)
GFR SERPL CREATININE-BSD FRML MDRD: 75 ML/MIN/1.73SQ M
GLUCOSE SERPL-MCNC: 161 MG/DL (ref 65–140)
POTASSIUM SERPL-SCNC: 3.7 MMOL/L (ref 3.5–5.3)
SODIUM SERPL-SCNC: 137 MMOL/L (ref 135–147)

## 2023-03-28 RX ADMIN — METOPROLOL TARTRATE 25 MG: 25 TABLET, FILM COATED ORAL at 08:39

## 2023-03-28 RX ADMIN — ASPIRIN 81 MG: 81 TABLET, CHEWABLE ORAL at 08:39

## 2023-03-28 RX ADMIN — TICAGRELOR 90 MG: 90 TABLET ORAL at 08:39

## 2023-03-30 NOTE — PROGRESS NOTES
Cardiology Follow Up    Dyan Aviles  1965  [address] [facility] [place] [facility]  [address] [address] [phone]    1  S/P drug eluting coronary stent placement        2  Mixed hyperlipidemia            Interval History:   Mr Mariella De León presented to [facility]  on 3/21/23 with anterior wall chest pain  Presented to urgent care with chest pain rating down his right arm  Admitted to a 2 to 3-week history of fatigue with mild to chest discomfort  He was directed to present to the emergency room  EKG normal   Follow-up EKG done with complaints of chest pain showed ST elevations in 2 3 and aVF  MI alert called  He was flown by [employer] to [address] [place] [address] in [place]  Mr Mariella De León was admitted to [facility] on 3/21 - 3/23/23 with STEMI   3/21/23 [facility] showed 95% complex stenosis in the mid RCA with ulceration  Mid LAD 80% stenosis, mid circumflex 60% stenosis  Sp agnioplasty x 4 with Synergy XD MR UX RACHANA, 0% residual stenosis  Plan to return in 1 week for staged PCI to LAD  TTE: Ventricular systolic function normal LVEF 95% diastolic function mildly abnormal consistent with grade 1 abnormal relaxation  Sigmoid appearance of the septum  Right ventricular cavity size systolic function normal   Mild to moderate TR  Hypokinetic basal inferior wall  All other segments normal   He was discharged on aspirin 81 mg daily, Lipitor 80 mg daily, metoprolol tartrate 25 mg every 12 hours, Brilinta 90 mg twice daily  Mr Mariella De León was admitted to [facility] on 3/27 - 3/28/23 for staged PCI  Proximal LAD 80% stenosis, post angioplasty x2 with [employer] drug-eluting stent, 0% residual stenosis  Mr Mariella De León presents to our office recent hopitalization follow up visit  He is accompanied by his wife    Santi Sofía denies dyspnea with minimal moderate exertion chest pain palpitation lightheadedness or dizziness  He will undergo cardiac rehab in the near future  Handout information provided on a heart healthy Mediterranean diet  He is a  and picks up heavy equipment  Tonita Eisenmenger will check with his company to see if he can reduce the amount he is lifting until mid April  Medical History   Hyperlipidemia 3/22/23 , , HDL 26, LDL 96   Patient Active Problem List   Diagnosis   • Hypercholesteremia   • Pneumonia   • BMI 27 0-27 9,adult   • Non-traumatic rhabdomyolysis   • Elevated BP without diagnosis of hypertension   • Other fatigue   • STEMI (ST elevation myocardial infarction) ([facility])   • Status post insertion of drug eluting coronary artery stent   • Coronary artery disease involving native coronary artery     No past medical history on file  Social History     Socioeconomic History   • Marital status: /Civil Union     Spouse name: Not on file   • Number of children: 2   • Years of education: Not on file   • Highest education level: Not on file   Occupational History   • Occupation:     Tobacco Use   • Smoking status: Never   • Smokeless tobacco: Former   Vaping Use   • Vaping Use: Never used   Substance and Sexual Activity   • Alcohol use: Not Currently     Comment: once per month   • Drug use: No   • Sexual activity: Not on file   Other Topics Concern   • Not on file   Social History Narrative   • Not on file     Social Determinants of Health     Financial Resource Strain: Not on file   Food Insecurity: No Food Insecurity   • Worried About Running Out of Food in the Last Year: Never true   • Ran Out of Food in the Last Year: Never true   Transportation Needs: No Transportation Needs   • Lack of Transportation (Medical): No   • Lack of Transportation (Non-Medical):  No   Physical Activity: Not on file   Stress: Not on file   Social Connections: Not on file   Intimate Partner Violence: Not on file   Housing Stability: Low Risk • Unable to Pay for Housing in the Last Year: No   • Number of Places Lived in the Last Year: 1   • Unstable Housing in the Last Year: No      Family History   Problem Relation Age of Onset   • No Known Problems Mother    • Coronary artery disease Father    • Kidney failure Father    • Heart attack Father    • Diabetes type II Father    • Heart attack Sister      Past Surgical History:   Procedure Laterality Date   • CARDIAC CATHETERIZATION N/A 3/21/2023    Procedure: Cardiac pci;  Surgeon: Diana Goldberg MD;  [facility]: [facility]; Service: Cardiology   • CARDIAC CATHETERIZATION N/A 3/21/2023    Procedure: Cardiac Coronary Angiogram;  Surgeon: Diana Goldberg MD;  [facility]; Service: Cardiology   • CARDIAC CATHETERIZATION N/A 3/27/2023    Procedure: Cardiac pci;  Surgeon: Diana Goldberg MD;  [facility];   Service: Cardiology   • FINGER SURGERY Right     Amputation of index finger with neurectomy (each), distal amputation        Current Outpatient Medications:   •  albuterol (ProAir HFA) 90 mcg/act inhaler, Inhale 2 puffs every 6 (six) hours as needed for wheezing, Disp: 8 5 g, Rfl: 0  •  aspirin 81 mg chewable tablet, Chew 1 tablet (81 mg total) daily Do not start before March 24, 2023 , Disp: , Rfl: 0  •  atorvastatin (LIPITOR) 80 mg tablet, Take 1 tablet (80 mg total) by mouth every evening, Disp: 30 tablet, Rfl: 2  •  ketoconazole (NIZORAL) 2 % cream, Apply topically daily, Disp: 15 g, Rfl: 1  •  metoprolol tartrate (LOPRESSOR) 25 mg tablet, Take 1 tablet (25 mg total) by mouth every 12 (twelve) hours, Disp: 60 tablet, Rfl: 2  •  nitroglycerin (NITROSTAT) 0 4 mg SL tablet, Place 1 tablet (0 4 mg total) under the tongue every 5 (five) minutes as needed for chest pain, Disp: 30 tablet, Rfl: 2  •  ticagrelor (Brilinta) 90 MG, Take 1 tablet (90 mg total) by mouth every 12 (twelve) hours, Disp: 60 tablet, Rfl: 11  No Known Allergies    Labs:  Admission on 03/27/2023, Discharged on 03/28/2023   Component Date Value   • SARS-CoV-2 03/27/2023 Negative    • INFLUENZA A PCR 03/27/2023 Negative    • INFLUENZA B PCR 03/27/2023 Negative    • RSV PCR 03/27/2023 Negative    • Sodium 03/27/2023 136    • Potassium 03/27/2023 3 8    • Chloride 03/27/2023 106    • CO2 03/27/2023 27    • ANION GAP 03/27/2023 3 (L)    • BUN 03/27/2023 18    • Creatinine 03/27/2023 1 11    • Glucose 03/27/2023 103    • Glucose, Fasting 03/27/2023 103 (H)    • Calcium 03/27/2023 9 2    • eGFR 03/27/2023 72    • Ventricular Rate 03/27/2023 64    • Atrial Rate 03/27/2023 64    • [place] Interval 03/27/2023 178    • QRSD Interval 03/27/2023 92    • QT Interval 03/27/2023 432    • QTC Interval 03/27/2023 445    • P Axis 03/27/2023 51    • QRS Axis 03/27/2023 29    • T Wave Axis 03/27/2023 16    • Activated Clotting Time,* 03/27/2023 412 (H)    • Specimen Type 03/27/2023 VENOUS    • Ventricular Rate 03/27/2023 60    • Atrial Rate 03/27/2023 60    • KY Interval 03/27/2023 178    • QRSD Interval 03/27/2023 92    • QT Interval 03/27/2023 426    • QTC Interval 03/27/2023 426    • P Axis 03/27/2023 45    • QRS Axis 03/27/2023 14    • T Wave Axis 03/27/2023 6    • Sodium 03/28/2023 137    • Potassium 03/28/2023 3 7    • Chloride 03/28/2023 109 (H)    • CO2 03/28/2023 24    • ANION GAP 03/28/2023 4    • BUN 03/28/2023 16    • Creatinine 03/28/2023 1 08    • Glucose 03/28/2023 161 (H)    • Calcium 03/28/2023 8 7    • eGFR 03/28/2023 75    Appointment on 03/25/2023   Component Date Value   • Sodium 03/25/2023 135    • Potassium 03/25/2023 3 8    • Chloride 03/25/2023 106    • CO2 03/25/2023 26    • ANION GAP 03/25/2023 3 (L)    • BUN 03/25/2023 20    • Creatinine 03/25/2023 1 09    • Glucose, Fasting 03/25/2023 92    • Calcium 03/25/2023 9 5    • eGFR 03/25/2023 74    Admission on 03/21/2023, Discharged on 03/23/2023   Component Date Value   • Platelets 99/70/6122 217    • MPV 03/21/2023 9 8    • LA size 03/22/2023 4    • Triscuspid Valve Regurgi* 03/22/2023 21 0    • Tricuspid valve peak reg* 03/22/2023 2 31    • LVPWd 03/22/2023 0 90    • Left Atrium Area-systoli* 03/22/2023 15    • Left Atrium Area-systoli* 03/22/2023 14 3    • MV E' Tissue Velocity Se* 03/22/2023 6    • Tricuspid annular plane * 03/22/2023 1 90    • TR Peak Brian 03/22/2023 2 3    • IVSd 03/22/2023 5 50    • LV DIASTOLIC VOLUME (MOD* 02/99/3800 73    • LEFT VENTRICLE SYSTOLIC * 29/36/3888 27    • Left ventricular stroke * 03/22/2023 46 00    • A4C EF 03/22/2023 57    • LA length (A2C) 03/22/2023 4 70    • LVIDd 03/22/2023 4 10    • IVS 03/22/2023 0 9    • LVIDS 03/22/2023 2 70    • FS 03/22/2023 34    • Ao root 03/22/2023 2 80    • RVID d 03/22/2023 3 4    • MV valve area p 1/2 meth* 03/22/2023 4 00    • E wave deceleration time 03/22/2023 191    • MV Peak E Brian 03/22/2023 65    • MV Peak A Brian 03/22/2023 0 78    • CITLALI A4C 03/22/2023 13 1    • RAA A4C 03/22/2023 10 8    • MV stenosis pressure 1/2* 03/22/2023 55    • LVSV, 2D 03/22/2023 46    • LV EF 03/22/2023 60    • hs TnI 0hr 03/21/2023 213 (H)    • Ventricular Rate 03/21/2023 62    • Atrial Rate 03/21/2023 62    • MN Interval 03/21/2023 168    • QRSD Interval 03/21/2023 92    • QT Interval 03/21/2023 426    • QTC Interval 03/21/2023 432    • P Axis 03/21/2023 45    • QRS Axis 03/21/2023 7    • T Wave Axis 03/21/2023 24    • Activated Clotting Time,* 03/21/2023 448 (H)    • Specimen Type 03/21/2023 VENOUS    • hs TnI 2hr 03/21/2023 232 (H)    • Delta 2hr hsTnI 03/21/2023 19    • hs TnI 4hr 03/21/2023 317 (H)    • Delta 4hr hsTnI 03/21/2023 104 (H)    • Sodium 03/22/2023 136    • Potassium 03/22/2023 3 7    • Chloride 03/22/2023 108    • CO2 03/22/2023 23    • ANION GAP 03/22/2023 5    • BUN 03/22/2023 14    • Creatinine 03/22/2023 0 90    • Glucose 03/22/2023 95    • Calcium 03/22/2023 8 7    • eGFR 03/22/2023 93    • Magnesium 03/22/2023 2 3    • Cholesterol 03/22/2023 181    • Triglycerides 03/22/2023 296 (H) • HDL, Direct 03/22/2023 26 (L)    • LDL Calculated 03/22/2023 96    • Hemoglobin A1C 03/21/2023 5 2    • EAG 03/21/2023 103    Admission on 03/21/2023, Discharged on 03/21/2023   Component Date Value   • WBC 03/21/2023 8 83    • RBC 03/21/2023 6 06 (H)    • Hemoglobin 03/21/2023 17 1 (H)    • Hematocrit 03/21/2023 51 0 (H)    • MCV 03/21/2023 84    • MCH 03/21/2023 28 2    • MCHC 03/21/2023 33 5    • RDW 03/21/2023 12 9    • MPV 03/21/2023 9 6    • Platelets 36/91/2150 232    • nRBC 03/21/2023 0    • Neutrophils Relative 03/21/2023 74    • Immat GRANS % 03/21/2023 0    • Lymphocytes Relative 03/21/2023 17    • Monocytes Relative 03/21/2023 7    • Eosinophils Relative 03/21/2023 1    • Basophils Relative 03/21/2023 1    • Neutrophils Absolute 03/21/2023 6 53    • Immature Grans Absolute 03/21/2023 0 03    • Lymphocytes Absolute 03/21/2023 1 46    • Monocytes Absolute 03/21/2023 0 61    • Eosinophils Absolute 03/21/2023 0 12    • Basophils Absolute 03/21/2023 0 08    • Sodium 03/21/2023 137    • Potassium 03/21/2023 3 9    • Chloride 03/21/2023 102    • CO2 03/21/2023 26    • ANION GAP 03/21/2023 9    • BUN 03/21/2023 15    • Creatinine 03/21/2023 0 99    • Glucose 03/21/2023 98    • Calcium 03/21/2023 10 3 (H)    • eGFR 03/21/2023 83    • hs TnI 0hr 03/21/2023 178 (H)    • D-Dimer, Quant 03/21/2023 0 32    • Ventricular Rate 03/21/2023 65    • Atrial Rate 03/21/2023 65    • OK Interval 03/21/2023 170    • QRSD Interval 03/21/2023 94    • QT Interval 03/21/2023 406    • QTC Interval 03/21/2023 422    • P Axis 03/21/2023 53    • QRS Axis 03/21/2023 42    • T Wave Axis 03/21/2023 63    • SARS-CoV-2 03/21/2023 Negative    • INFLUENZA A PCR 03/21/2023 Negative    • INFLUENZA B PCR 03/21/2023 Negative    • RSV PCR 03/21/2023 Negative    • Ventricular Rate 03/21/2023 78    • Atrial Rate 03/21/2023 78    • OK Interval 03/21/2023 168    • QRSD Interval 03/21/2023 100    • QT Interval 03/21/2023 396    • QTC Interval 03/21/2023 451    • P Axis 03/21/2023 61    • QRS Axis 03/21/2023 62    • T Wave Axis 03/21/2023 94    • Ventricular Rate 03/21/2023 81    • Atrial Rate 03/21/2023 81    • HI Interval 03/21/2023 170    • QRSD Interval 03/21/2023 90    • QT Interval 03/21/2023 384    • QTC Interval 03/21/2023 446    • P Axis 03/21/2023 64    • QRS Axis 03/21/2023 44    • T Wave Axis 03/21/2023 81    Office Visit on 03/21/2023   Component Date Value   • Ventricular Rate 03/21/2023 61    • Atrial Rate 03/21/2023 61    • HI Interval 03/21/2023 174    • QRSD Interval 03/21/2023 88    • QT Interval 03/21/2023 414    • QTC Interval 03/21/2023 416    • P Axis 03/21/2023 44    • QRS Axis 03/21/2023 41    • T Wave Axis 03/21/2023 56      Imaging: XR chest pa & lateral    Result Date: 3/21/2023  Narrative: CHEST INDICATION:  R07 9: Chest pain, unspecified  COMPARISON:  4/4/2022 EXAM PERFORMED/VIEWS:  XR CHEST PA & LATERAL FINDINGS: Cardiomediastinal silhouette appears unremarkable  The lungs are clear  No pneumothorax or pleural effusion  Osseous structures appear within normal limits for patient age  Impression: No acute cardiopulmonary disease  Workstation performed: [id number]     Cardiac catheterization    Result Date: 3/27/2023  Narrative: •  Prox LAD lesion is 80% stenosed  Successful IVUS-guided PCI, proximal LAD, with a reduction in stenosis from 80% to 0% following placement of a 3 0x18mm RACHANA, post-dilated to 3 5mm proximally  Plan: DAPT, statin, follow-up appointment with cardiology, cardiac rehabilitation  Cardiac catheterization    Addendum Date: 3/21/2023 Addendum:   •  Mid LAD lesion is 80% stenosed  •  Mid Cx lesion is 60% stenosed  •  Mid RCA lesion is 95% stenosed  Multivessel coronary disease  There was a 95% complex stenosis in the mid RCA with ulceration  This was the culprit for the patient's inferior STEMI  There is also an 80% stenosis in the diffusely atherosclerotic mid LAD    The circumflex contained noncritical plaque  Successful PCI, mid RCA, with a reduction in stenosis from 95% to 0% following placement of a 4 0x24mm RACHANA  Plan: DAPT, statin, echocardiogram, beta-blockade  Return in 1 week for staged PCI of the mid LAD  There was a severe radial loop  Access was femoral  Hemostasis was achieved with a Perclose device  Result Date: 3/21/2023  Narrative: •  Mid LAD lesion is 80% stenosed  •  Mid Cx lesion is 60% stenosed  •  Mid RCA lesion is 95% stenosed  Multivessel coronary disease  There was a 95% complex stenosis in the mid RCA with ulceration  This was the culprit for the patient's inferior STEMI  There is also an 80% stenosis in the diffusely atherosclerotic mid LAD  The circumflex contained noncritical plaque  Successful PCI, mid RCA, with a reduction in stenosis from 95% to 0% following placement of a 4 0x24mm RACHANA  Plan: DAPT, statin, echocardiogram, beta-blockade  Return in 1 week for staged PCI of the mid LAD  Echo complete w/ contrast if indicated    Result Date: 3/22/2023  Narrative: •  Left Ventricle: Left ventricular cavity size is normal  Wall thickness is normal  There is no concentric hypertrophy  The left ventricular ejection fraction is 60%  Systolic function is normal  Diastolic function is mildly abnormal, consistent with grade I (abnormal) relaxation  •  IVS: There is sigmoid appearance of the septum  •  Right Ventricle: Right ventricular cavity size is normal  Systolic function is normal  •  Mitral Valve: There is mild annular calcification  •  Tricuspid Valve: There is mild to moderate regurgitation  •  The following segments are hypokinetic: basal inferior  •  All other segments are normal        Review of Systems:  Review of Systems   All other systems reviewed and are negative  Physical Exam:  Physical Exam  Vitals reviewed  Constitutional:       Appearance: Normal appearance  Cardiovascular:      Rate and Rhythm: Normal rate  Pulses: Normal pulses  Heart sounds: Normal heart sounds  Pulmonary:      Effort: Pulmonary effort is normal       Breath sounds: Normal breath sounds  Abdominal:      General: Bowel sounds are normal       Palpations: Abdomen is soft  Musculoskeletal:         General: Normal range of motion  Cervical back: Normal range of motion and neck supple  Right lower leg: No edema  Skin:     General: Skin is warm and dry  Capillary Refill: Capillary refill takes less than 2 seconds  Neurological:      General: No focal deficit present  Mental Status: He is alert and oriented to person, place, and time  Psychiatric:         Mood and Affect: Mood normal          Behavior: Behavior normal          Discussion/Summary:  1  3/21/23 mid RCA 95% stenosis, sp Sp agnioplasty x 4 with Synergy XD MR UX RACHANA, 0% residual stenosis  3/27/23 sp Proximal LAD 80% stenosis, post angioplasty x2 with [address] drug-eluting stent, 0% residual stenosis  Continue on aspirin 81 mg daily, Brilinta 90 mg every 12 hours for 1 year  Instructed not to stop for any reason  Continue on metoprolol tartrate 25 mg every 12 hours, Lipitor 80 mg daily, he has sublingual nitroglycerin and is aware of the proper use  Cardiac rehabilitation in the near future  Handout information provided on a Mediterranean diet  2  Hyperlipidemia 3/22/23 , , HDL 26, LDL 96 continue on Lipitor 80 mg daily    Follow-up lipid profile in 3 to 6 months per primary cardiologist, DASH diet

## 2023-04-03 ENCOUNTER — OFFICE VISIT (OUTPATIENT)
Dept: CARDIOLOGY CLINIC | Facility: CLINIC | Age: 58
End: 2023-04-03

## 2023-04-03 VITALS
HEIGHT: 69 IN | OXYGEN SATURATION: 95 % | SYSTOLIC BLOOD PRESSURE: 104 MMHG | WEIGHT: 191 LBS | BODY MASS INDEX: 28.29 KG/M2 | HEART RATE: 66 BPM | DIASTOLIC BLOOD PRESSURE: 70 MMHG

## 2023-04-03 DIAGNOSIS — E78.2 MIXED HYPERLIPIDEMIA: ICD-10-CM

## 2023-04-03 DIAGNOSIS — Z95.5 S/P DRUG ELUTING CORONARY STENT PLACEMENT: Primary | ICD-10-CM

## 2023-04-03 NOTE — LETTER
April 3, 2023     Patient: Rachel Holder  YOB: 1965  Date of Visit: 4/3/2023      To Whom it May Concern:    Rachel Holder is under my professional care  Katelyn Mirza was seen in my office on 4/3/2023  Oscar D {Return to school/sport/work:[id number]}  If you have any questions or concerns, please don't hesitate to call           Sincerely,          CARMEN Tobin        CC: No Recipients

## 2023-04-03 NOTE — PROGRESS NOTES
Cardiac Rehabilitation Plan of Care   Initial Care Plan          Today's date: 2023   # of Exercise Sessions Completed: Initial Visit  Patient name: Jaden Tran      : 1965  Age: 61 y o  MRN: [id number]  Referring Physician: Saqib Savage MD  Cardiologist: Dr Soo Mares, DO  Provider: Luis Olson  Clinician: Smita King, MPT, CCRP    Dx: STEMI  S/P PCI w/RACHANA to mid RCA ( 3/21) and LAD (3/27)    Date of onset: 3/21/2023      SUMMARY OF PROGRESS:  Today is the patient's initial visit at Cardiac Rehab  Patient admits to 100% medication compliance  Patient reports the following physical limitations: fatigue, WYMAN and general weakness  The patient completed an initial Submaximal ETT for a total of 6 minutesand reached a 3 1 MET level  Patient does not require supplemental O2 or CPAP at this time  Patient's PHQ9 Score was seven  At this time, patient denies having depression  Patient's GAD7 Score was one  At this time, patient denies having anxiety  Currently, the patient does not follow a formal exercise program at home  Patient was counseled on exercise guidelines including 1-2 days of moderate exercise on opposite days of Cardiac Rehab, as tolerated  Patient was issued the Cardiopulmonary Guide this session  At rest, the patient's HR was 70, O2Sat was 99%, and BP was 122/66  During exercise, the patient's peak HR was 106, while peak BP was 136/68  During the test he was able to maintain his 02 Sat at 94% or greater  Patient rated the test a 1 on a 1-10 RPE Scale  He denied any WYMAN  During recovery, the patient's HR was 89, O2Sat was 99%, and BP was 120/64  Patient denied having any other symptoms during exercise  Additionally, the patient's telemetry revealed NSR  Patient had correct hemodynamic responses to activity       Patient has goals of initiate HEP, reduce risk profile, improve Lipid Panel, maintain a healthy weight, reduce reliance on medication, return to work full time/duty and attain his maximal level of function          Patient is agreeable to coming to Cardiac Rehab 2-3X  times/week for 12 weeks or until he reaches 36 sessions  He is scheduled to begin CR 2023 following his cardiology appointment  Patient expresses concern about being able to tolerate his work duties  He is a  and must be able to tolerate heavy duty work  He states he was instructed to have a 50# lifting restriction for one month  He states he frequently lifts > 50# at work  Patient has a follow up appointment w/cardiology tomorrow to discuss these issues  Patient and spouse would like him to participate in CR prior to returning to work in order to progress towards that goal w/correct hemodynamics  Medication compliance: Yes   Comments: Pt reports to be compliant with medications  Fall Risk: Low   Comments: Ambulates with a steady gait with no assist device and Denies a fall in the past 6 months    EKG Interpretation: NSR      EXERCISE ASSESSMENT and PLAN    Exercise Prescription:      Frequency: 2-3 days/week   Supplement with home exercise 2+ days/wk as tolerated       Minutes: 35-40         METS: 3 5 to 5 0            HR:    RPE: 4-6/10        Modalities: Treadmill, UBE, NuStep and Recumbent bike      30 Day Goals for Exercise Progression:    Frequency: 2-3 days/week of cardiac rehab       Supplement with home exercise 3+ days/wk as tolerated    Minutes: 40-45                           >150 mins/wk of moderate intensity exercise   METS: 5 to 7 5   HR:    RPE: 4-6/10   Modalities: Treadmill, Airdyne bike, UBE, Lifecycle, Elliptical, Rower, NuStep and Recumbent bike    Strength trainin-3 days / week  12-15 repetitions  1-2 sets per modality    Modalities: Leg Press and UE Free Weights    Home Exercise: No formal HEP; patient states he does much walking at home and work each day       Goals: 10% improvement in functional capacity - based on max METs achieved in fitness assessment, improved DASI score by 10%, Increase in exercise capacity by 40% - based on peak METs tolerated in cardiac rehab exercise session, Exercise 5 days/wk, >150mins/wk of moderate intensity exercise, Resume ADLs with increased strength, Return to work unrestricted, Attend Rehab regularly and start a home exercise program    Progression Toward Goals:  Reviewed Pt goals and determined plan of care    Education: benefit of exercise for CAD risk factors, home exercise guidelines, AHA guidelines to achieve >150 mins/wk of moderate exercise, RPE scale and class: Risk Factors for Heart Disease   Plan:education on home exercise guidelines, home exercise 30+ mins 2 days opposite CR and Education class: Risk Factors for Heart Disease  Readiness to change: Preparation:  (Getting ready to change)       NUTRITION ASSESSMENT AND PLAN    Weight control:    Starting weight: 191   Current weight:   191  Waist circumference:    Starting: NA   Current:  NA    Diabetes: N/A  A1c: 5 2   last measured: 3/21/2023    Lipid management: Discussed diet and lipid management and Last lipid profile 3/22/2023  Chol 181    HDL 26  LDL 96    Goals:HDL >40, TRG <150, Improved Rate Your Plate score  >39, choose lean meat (93-95%), eliminate processed meats, reduce portion sizes of meat to 3oz or less, increase intake of fish, shellfish, cook without added fat or use vegetable oil/spray, increase intake of meatless meals, use low fat dairy, reduce cheese intake or use reduced-fat, eat 3 or more servings of whole grains a day, Eat 4-5 cups of fruits and vegetables daily, choose low sodium processed foods, eliminate butter, choose healthy snacks: light popcorn, plain pretzels and daily saturated fat intake <7%/13g    Measurable goals were based Rate Your Plate Dietary Self-Assessment  These are the areas in which the patient could score higher on the assessment    Goals include recommendations for a heart healthy diet based on American Heart Association      Progression Toward Goals: Reviewed Pt goals and determined plan of care    Education: heart healthy eating  low sodium diet  hydration  education class: Heart Healthy Eating  education class:  Label Reading  Plan: Education class: Reading Food Labels, Education Class: Heart Healthy Eating, replace butter with soft spreads made with olive oil, canola or yogurt, replace refined grain bread with whole grain bread, replace unhealthy snacks with fruits & vegs, reduce portion sizes, eat fewer desserts and sweets, avoid processed foods, remove salt shaker from table and drink more water  Readiness to change: Preparation:  (Getting ready to change)       PSYCHOSOCIAL ASSESSMENT AND PLAN    Emotional:  Depression assessment:  PHQ-9 = 7;   5-9 = Mild Depression            Anxiety measure:  BREANNE-7 = 1;   0-4  = Not anxious  Self-reported stress level:  2/10  Social support: Excellent    Goals:  Reduce perceived stress to 1-3/10, improved [facility] QOL < 27, Feelings in [facility] Score < 3, Physical Fitness in [facility] Score < 3, Social Support in [facility] Score < 3, Daily Activity in [facility] Score < 3, Social Activities in [facility] Score < 3, Overall Health in [facility] Score < 3, Change in Health in [place] Score < 3 , Increased interest in doing things and improved sleep    Progression Toward Goals: Reviewed Pt goals and determined plan of care    Education: signs/sxs of depression, benefits of a positive support system, stress management techniques, class:  Stress and Your Health  and class:  Relaxation  Plan: Class: Stress and Your Health, Class: Relaxation, Practice relaxation techniques, Exercise, Spend time outdoors, Enjoy a hobby and Read a Book  Readiness to change: Preparation:  (Getting ready to change)       OTHER CORE COMPONENTS     Tobacco:   Social History     Tobacco Use   Smoking Status Never   Smokeless Tobacco Former       Tobacco Use Intervention:   N/A:  Patient is a non-smoker "    Anginal Symptoms:  chest pressure, RUE pain and symptoms of indigestion   NTG use: Compliant with carrying NTG, Understands proper use and Pt has not used NTG since event    Blood pressure:    Restin/66   Exercise: 136/68   Recovery: 120/64    Goals: consistent BP < 130/80, reduced dietary sodium <2300mg, moderate intensity exercise >150 mins/wk, medication compliance and reduce number of medications  needed for BP control    Progression Toward Goals: Reviewed Pt goals and determined plan of care    Education:  understanding high blood pressure and it's relationship to CAD, low sodium diet and HTN, proper use of sublingual NTG, Education class:  Common Heart Medications and Education class: Understanding Heart Disease  Plan: Class: Understanding Heart Disease, Class: Common Heart Medications, avoid places with second hand smoke, Avoid Processed foods, engage in regular exercise, eliminate salt shaker at the table and use salt substitutes  Readiness to change: Preparation:  (Getting ready to change)             CARDIAC REHAB ASSESSMENT    Today's date: 2023  Patient name: Ck Vivas     : 1965       MRN: [id number]  PCP: Hazel Dobson  Referring Physician: Rene Naranjo MD  Cardiologist: Dr Mechelle Gottlieb DO  Surgeon: Dr Hazel Waddell MD  Dx: :STEMI  S/P PCI w/RACHANA to mid RCA      Date of onset: 3/21/2023  Cultural needs: None    Weight    Wt Readings from Last 1 Encounters:   23 86 6 kg (191 lb)      Height:   Ht Readings from Last 1 Encounters:   23 5' 9\" (1 753 m)     Medical History: No past medical history on file  Physical Limitations: Patient states he was instructed to have a 50# weight restriction for one month       Fall Risk: Low   Comments: Ambulates with a steady gait with no assist device and Denies a fall in the past 6 months    Anginal Equivalent: Chest Pressure and RUE pain and sympotms of indigestion   NTG use: Compliant with carrying NTG, " Understands proper use and Pt has not used NTG since event    Risk Factors   Cholesterol: Yes  Smoking: Never used  HTN: No  DM: No  Obesity: No   Inactivity: No  Stress:  perceived  stress: 2/10   Stressors: Current health issues and employment   Goals for Stress Management:Exercise, Maintain a stress diary, Consult a Counselor, Spend time outside, Enjoy a hobby and Spend time with family    Family History:  Family History   Problem Relation Age of Onset   • No Known Problems Mother    • Coronary artery disease Father    • Kidney failure Father    • Heart attack Father    • Diabetes type II Father    • Heart attack Sister        Allergies: Patient has no known allergies  ETOH:   Social History     Substance and Sexual Activity   Alcohol Use Not Currently    Comment: once per month         Current Medications:   Current Outpatient Medications   Medication Sig Dispense Refill   • albuterol (ProAir HFA) 90 mcg/act inhaler Inhale 2 puffs every 6 (six) hours as needed for wheezing (Patient not taking: Reported on 4/3/2023) 8 5 g 0   • aspirin 81 mg chewable tablet Chew 1 tablet (81 mg total) daily Do not start before March 24, 2023   0   • atorvastatin (LIPITOR) 80 mg tablet Take 1 tablet (80 mg total) by mouth every evening 30 tablet 2   • metoprolol tartrate (LOPRESSOR) 25 mg tablet Take 1 tablet (25 mg total) by mouth every 12 (twelve) hours 60 tablet 2   • nitroglycerin (NITROSTAT) 0 4 mg SL tablet Place 1 tablet (0 4 mg total) under the tongue every 5 (five) minutes as needed for chest pain (Patient not taking: Reported on 4/3/2023) 30 tablet 2   • ticagrelor (Brilinta) 90 MG Take 1 tablet (90 mg total) by mouth every 12 (twelve) hours 60 tablet 11     No current facility-administered medications for this visit           Functional Status Prior to Diagnosis for Treatment   Occupation: plans to return to work when medically stable -full time   Recreation: Outdoor activities and spending time w/grandson  ADL’s: No limitations  [place]: No limitations  Exercise: No formal HEP  Other: Hx of exposures due to employment    Current Functional Status  Occupation: plans to return to work   Recreation: as above  ADL’s:Capable of performing light to moderate ADLs  [place]: resumed all ADLs  Exercise: Agrees to attend CR  Other:     Patient Specific Goals:  Patient has goals of initiate HEP, reduce risk profile, improve Lipid Panel, maintain a healthy weight, reduce reliance on medication, return to work full time/duty and attain his maximal level of function            Short Term Program Goals: dietary modifications increased strength improved energy/stamina with ADLs exercise 120-150 mins/wk return to work    Long Term Goals: increased maximal walking duration  increased intial training workload  Improved [facility] Activity Status score  Improved functional capacity  Improved Quality of Life - [facility] score reduced  Improved lipid profile  Reduced stress  improved Rate Your Plate Score    Ability to reach goals/rehabilitation potential:  Excellent    Projected return to function: 12 weeks  Objective tests: sub-max TM ETT      Nutritional   Reviewed details of Rate your Plate  Discussed key elements of heart healthy eating  Reviewed patient goals for dietary modifications and their clinical implications  Reviewed most recent lipid profile       Goals for dietary modification based on Rate Your Plate Dietary Assessment:  choose lean cuts of meat  poultry without the skin  low fat ground meat and poultry  eliminate processed meats  reduce portions of meat to 3 oz  more meatless meals  low fat dairy   increase whole grains  increase fruits and vegetables  low sodium  improved snack choices  more nuts/seeds      Emotional/Social  Patient reports he/she is coping well with good social support and denies depression or anxiety    Marital status:     Domestic Violence Screening: No    Comments: Patient requires skilled PT interventions in order to attain his gaols and maximal level of function  CR is medically nosegay for secondary prevention

## 2023-04-04 ENCOUNTER — CLINICAL SUPPORT (OUTPATIENT)
Dept: CARDIAC REHAB | Facility: HOSPITAL | Age: 58
End: 2023-04-04

## 2023-04-04 DIAGNOSIS — I21.3 STEMI (ST ELEVATION MYOCARDIAL INFARCTION) (HCC): ICD-10-CM

## 2023-04-04 DIAGNOSIS — I21.3 ST ELEVATION MYOCARDIAL INFARCTION (STEMI), UNSPECIFIED ARTERY (HCC): ICD-10-CM

## 2023-04-04 DIAGNOSIS — Z95.5 S/P CORONARY ARTERY STENT PLACEMENT: Primary | ICD-10-CM

## 2023-04-05 ENCOUNTER — CLINICAL SUPPORT (OUTPATIENT)
Dept: CARDIAC REHAB | Facility: HOSPITAL | Age: 58
End: 2023-04-05

## 2023-04-05 ENCOUNTER — OFFICE VISIT (OUTPATIENT)
Dept: CARDIOLOGY CLINIC | Facility: CLINIC | Age: 58
End: 2023-04-05

## 2023-04-05 VITALS
SYSTOLIC BLOOD PRESSURE: 122 MMHG | HEIGHT: 69 IN | BODY MASS INDEX: 28.14 KG/M2 | WEIGHT: 190 LBS | HEART RATE: 76 BPM | DIASTOLIC BLOOD PRESSURE: 82 MMHG

## 2023-04-05 DIAGNOSIS — I25.10 CORONARY ARTERY DISEASE INVOLVING NATIVE CORONARY ARTERY OF NATIVE HEART WITHOUT ANGINA PECTORIS: ICD-10-CM

## 2023-04-05 DIAGNOSIS — I21.3 ST ELEVATION MYOCARDIAL INFARCTION (STEMI), UNSPECIFIED ARTERY (HCC): Primary | ICD-10-CM

## 2023-04-05 DIAGNOSIS — Z95.5 S/P CORONARY ARTERY STENT PLACEMENT: ICD-10-CM

## 2023-04-05 DIAGNOSIS — E78.00 HYPERCHOLESTEREMIA: ICD-10-CM

## 2023-04-05 NOTE — PROGRESS NOTES
Cardiology Follow up    Allison Snell  [id number]  1965  [facility] [address] [address]      1  ST elevation myocardial infarction (STEMI), unspecified artery ([facility] [address] )        2  BMI 27 0-27 9,adult        3  Hypercholesteremia        4  Coronary artery disease involving native coronary artery of native heart without angina pectoris            Discussion/Summary:  1  Recent inferior ST segment elevation MI 3/21/2023  2  CAD PCI to RCA 3/21/2023, PCI to LAD 3/27/2023  3  Hyperlipidemia  4    Overweight      -Transthoracic echocardiogram 3/22/2023 showing left-ventricular systolic function normal estimated LVEF 60% with grade 1 diastolic dysfunction, hypokinesis of the basal inferior wall with sigmoid appearance of the septum normal right ventricular systolic function with mild to moderate tricuspid regurgitation   -Patient will continue dual antiplatelet therapy with aspirin 81 mg daily, Brilinta 90 mg twice daily, will continue metoprolol tartrate 25 mg twice daily and atorvastatin 80 mg daily  -Patient will continue cardiac rehabilitation and was counseled on the importance of compliance with interventional recommendations for avoidance of heavy lifting until groin site is completely and adequately healed   -Patient notes understanding of above and will be in compliance completely with interventional recommendations and will follow-up with cardiac rehab as scheduled  -Patient counseled on dietary lifestyle modifications including following a low-salt, low-fat, heart healthy diet with continued graded physical activity monitor to cardiac rehab  -Patient okay to return to work 4/10/2022 as long as there is no significant issue in the meantime   -Patient counseled if he were to have any warning or alarm type symptoms he is to seek emergency medical care immediately  -We will see patient in 3 months or sooner if necessary  History of Present Illness:  -Patient is a 78-year-old male with inferior ST segment elevation on 3/21/2023 who initially presented to [address] [place] [address] and was transferred to [facility] in [place] found to have multivessel CAD with PCI to culprit lesion in mid RCA via right femoral access closed with Perclose as he had severe radial loop that prohibited use of right radial artery access and underwent staged procedure 3/27/2023 with PCI to LAD with successful IVUS guided PCI to proximal LAD again femoral artery closed with Perclose initiated on appropriate medical therapy with dual antiplatelet therapy, metoprolol tartrate and Lipitor and presents to the office today for scheduled hospital follow-up  Patient was seen and evaluated at cardiac rehab on 4/4/2023 and some concern was raised that patient wished to return to work however as a  there are times at work where he needs to lift greater than 50 pounds and due to recent femoral access concern was raised and patient had been told to not be lifting more than 10 pounds for at least 10 days post procedure by interventional team prior to discharge    -Currently in the office today he denies any chest pain, palpitations, lightheadedness or dizziness, loss of consciousness, shortness of breath, lower extremity edema, orthopnea or bendopnea  He has no significant soreness or swelling at incision or groin site and states that overall he feels well   -Patient denies any tobacco, alcohol, illicit drug use      Patient Active Problem List   Diagnosis   • Hypercholesteremia   • Pneumonia   • BMI 27 0-27 9,adult   • Non-traumatic rhabdomyolysis   • Elevated BP without diagnosis of hypertension   • Other fatigue   • STEMI (ST elevation myocardial infarction) ([facility] [address] )   • Status post insertion of drug eluting coronary artery stent   • Coronary artery disease involving native coronary artery     Past Medical History:   Diagnosis Date   • Coronary artery disease    • ST elevation MI (STEMI) 03/21/2023     Social History     Socioeconomic History   • Marital status: /Civil Union     Spouse name: Not on file   • Number of children: 2   • Years of education: Not on file   • Highest education level: Not on file   Occupational History   • Occupation:     Tobacco Use   • Smoking status: Never   • Smokeless tobacco: Former   Vaping Use   • Vaping Use: Never used   Substance and Sexual Activity   • Alcohol use: Not Currently     Comment: once per month   • Drug use: No   • Sexual activity: Not on file   Other Topics Concern   • Not on file   Social History Narrative   • Not on file     Social Determinants of Health     Financial Resource Strain: Not on file   Food Insecurity: No Food Insecurity   • Worried About Running Out of Food in the Last Year: Never true   • Ran Out of Food in the Last Year: Never true   Transportation Needs: No Transportation Needs   • Lack of Transportation (Medical): No   • Lack of Transportation (Non-Medical): No   Physical Activity: Not on file   Stress: Not on file   Social Connections: Not on file   Intimate Partner Violence: Not on file   Housing Stability: Low Risk    • Unable to Pay for Housing in the Last Year: No   • Number of Places Lived in the Last Year: 1   • Unstable Housing in the Last Year: No      Family History   Problem Relation Age of Onset   • No Known Problems Mother    • Coronary artery disease Father    • Kidney failure Father    • Heart attack Father    • Diabetes type II Father    • Heart attack Sister      Past Surgical History:   Procedure Laterality Date   • CARDIAC CATHETERIZATION N/A 3/21/2023    Procedure: Cardiac pci;  Surgeon: Valdemar Talavera MD;  Location: [facility];   Service: Cardiology   • CARDIAC CATHETERIZATION N/A 3/21/2023    Procedure: Cardiac Coronary Angiogram;  Surgeon: Valdemar Talavera MD;  Location: [facility]; Service: Cardiology   • CARDIAC CATHETERIZATION N/A 3/27/2023    Procedure: Cardiac pci;  Surgeon: Blaze Fernandez MD;  Location: [facility];   Service: Cardiology   • FINGER SURGERY Right     Amputation of index finger with neurectomy (each), distal amputation        Current Outpatient Medications:   •  aspirin 81 mg chewable tablet, Chew 1 tablet (81 mg total) daily Do not start before March 24, 2023 , Disp: , Rfl: 0  •  atorvastatin (LIPITOR) 80 mg tablet, Take 1 tablet (80 mg total) by mouth every evening, Disp: 30 tablet, Rfl: 2  •  metoprolol tartrate (LOPRESSOR) 25 mg tablet, Take 1 tablet (25 mg total) by mouth every 12 (twelve) hours, Disp: 60 tablet, Rfl: 2  •  ticagrelor (Brilinta) 90 MG, Take 1 tablet (90 mg total) by mouth every 12 (twelve) hours, Disp: 60 tablet, Rfl: 11  •  albuterol (ProAir HFA) 90 mcg/act inhaler, Inhale 2 puffs every 6 (six) hours as needed for wheezing (Patient not taking: Reported on 4/3/2023), Disp: 8 5 g, Rfl: 0  •  nitroglycerin (NITROSTAT) 0 4 mg SL tablet, Place 1 tablet (0 4 mg total) under the tongue every 5 (five) minutes as needed for chest pain (Patient not taking: Reported on 4/3/2023), Disp: 30 tablet, Rfl: 2  No Known Allergies      Labs:  Admission on 03/27/2023, Discharged on 03/28/2023   Component Date Value   • SARS-CoV-2 03/27/2023 Negative    • INFLUENZA A PCR 03/27/2023 Negative    • INFLUENZA B PCR 03/27/2023 Negative    • RSV PCR 03/27/2023 Negative    • Sodium 03/27/2023 136    • Potassium 03/27/2023 3 8    • Chloride 03/27/2023 106    • CO2 03/27/2023 27    • ANION GAP 03/27/2023 3 (L)    • BUN 03/27/2023 18    • Creatinine 03/27/2023 1 11    • Glucose 03/27/2023 103    • Glucose, Fasting 03/27/2023 103 (H)    • Calcium 03/27/2023 9 2    • eGFR 03/27/2023 72    • Ventricular Rate 03/27/2023 64    • Atrial Rate 03/27/2023 64    • OK Interval 03/27/2023 178    • QRSD Interval 03/27/2023 92    • QT Interval 03/27/2023 432    • QTC Interval 03/27/2023 445    • P Axis 03/27/2023 51    • QRS Axis 03/27/2023 29    • T Wave Axis 03/27/2023 16    • Activated Clotting Time,* 03/27/2023 412 (H)    • Specimen Type 03/27/2023 VENOUS    • Ventricular Rate 03/27/2023 60    • Atrial Rate 03/27/2023 60    • AZ Interval 03/27/2023 178    • QRSD Interval 03/27/2023 92    • QT Interval 03/27/2023 426    • QTC Interval 03/27/2023 426    • P Axis 03/27/2023 45    • QRS Axis 03/27/2023 14    • T Wave Axis 03/27/2023 6    • Sodium 03/28/2023 137    • Potassium 03/28/2023 3 7    • Chloride 03/28/2023 109 (H)    • CO2 03/28/2023 24    • ANION GAP 03/28/2023 4    • BUN 03/28/2023 16    • Creatinine 03/28/2023 1 08    • Glucose 03/28/2023 161 (H)    • Calcium 03/28/2023 8 7    • eGFR 03/28/2023 75    Appointment on 03/25/2023   Component Date Value   • Sodium 03/25/2023 135    • Potassium 03/25/2023 3 8    • Chloride 03/25/2023 106    • CO2 03/25/2023 26    • ANION GAP 03/25/2023 3 (L)    • BUN 03/25/2023 20    • Creatinine 03/25/2023 1 09    • Glucose, Fasting 03/25/2023 92    • Calcium 03/25/2023 9 5    • eGFR 03/25/2023 74    Admission on 03/21/2023, Discharged on 03/23/2023   Component Date Value   • Platelets 89/22/3637 217    • MPV 03/21/2023 9 8    • LA size 03/22/2023 4    • Triscuspid Valve Regurgi* 03/22/2023 21 0    • Tricuspid valve peak reg* 03/22/2023 2 31    • LVPWd 03/22/2023 0 90    • Left Atrium Area-systoli* 03/22/2023 15    • Left Atrium Area-systoli* 03/22/2023 14 3    • MV E' Tissue Velocity Se* 03/22/2023 6    • Tricuspid annular plane * 03/22/2023 1 90    • TR Peak Brian 03/22/2023 2 3    • IVSd 03/22/2023 1 01    • LV DIASTOLIC VOLUME (MOD* 04/67/7349 73    • LEFT VENTRICLE SYSTOLIC * 11/26/8945 27    • Left ventricular stroke * 03/22/2023 46 00    • A4C EF 03/22/2023 57    • LA length (A2C) 03/22/2023 4 70    • LVIDd 03/22/2023 4 10    • IVS 03/22/2023 0 9    • LVIDS 03/22/2023 2 70    • FS 03/22/2023 34    • Ao root 03/22/2023 2 80    • RVID d 03/22/2023 3 4    • MV valve area p 1/2 meth* 03/22/2023 4 00    • E wave deceleration time 03/22/2023 191    • MV Peak E Brian 03/22/2023 65    • MV Peak A Brian 03/22/2023 0 78    • CITLALI A4C 03/22/2023 13 1    • RAA A4C 03/22/2023 10 8    • MV stenosis pressure 1/2* 03/22/2023 55    • LVSV, 2D 03/22/2023 46    • LV EF 03/22/2023 60    • hs TnI 0hr 03/21/2023 213 (H)    • Ventricular Rate 03/21/2023 62    • Atrial Rate 03/21/2023 62    • AR Interval 03/21/2023 168    • QRSD Interval 03/21/2023 92    • QT Interval 03/21/2023 426    • QTC Interval 03/21/2023 432    • P Axis 03/21/2023 45    • QRS Axis 03/21/2023 7    • T Wave Axis 03/21/2023 24    • Activated Clotting Time,* 03/21/2023 448 (H)    • Specimen Type 03/21/2023 VENOUS    • hs TnI 2hr 03/21/2023 232 (H)    • Delta 2hr hsTnI 03/21/2023 19    • hs TnI 4hr 03/21/2023 317 (H)    • Delta 4hr hsTnI 03/21/2023 104 (H)    • Sodium 03/22/2023 136    • Potassium 03/22/2023 3 7    • Chloride 03/22/2023 108    • CO2 03/22/2023 23    • ANION GAP 03/22/2023 5    • BUN 03/22/2023 14    • Creatinine 03/22/2023 0 90    • Glucose 03/22/2023 95    • Calcium 03/22/2023 8 7    • eGFR 03/22/2023 93    • Magnesium 03/22/2023 2 3    • Cholesterol 03/22/2023 181    • Triglycerides 03/22/2023 296 (H)    • HDL, Direct 03/22/2023 26 (L)    • LDL Calculated 03/22/2023 96    • Hemoglobin A1C 03/21/2023 5 2    • EAG 03/21/2023 103    Admission on 03/21/2023, Discharged on 03/21/2023   Component Date Value   • WBC 03/21/2023 8 83    • RBC 03/21/2023 6 06 (H)    • Hemoglobin 03/21/2023 17 1 (H)    • Hematocrit 03/21/2023 51 0 (H)    • MCV 03/21/2023 84    • MCH 03/21/2023 28 2    • MCHC 03/21/2023 33 5    • RDW 03/21/2023 12 9    • MPV 03/21/2023 9 6    • Platelets 93/84/6614 232    • nRBC 03/21/2023 0    • Neutrophils Relative 03/21/2023 74    • Immat GRANS % 03/21/2023 0    • Lymphocytes Relative 03/21/2023 17    • Monocytes Relative 03/21/2023 7    • Eosinophils Relative 03/21/2023 1    • Basophils Relative 03/21/2023 1    • Neutrophils Absolute 03/21/2023 6 53    • Immature Grans Absolute 03/21/2023 0 03    • Lymphocytes Absolute 03/21/2023 1 46    • Monocytes Absolute 03/21/2023 0 61    • Eosinophils Absolute 03/21/2023 0 12    • Basophils Absolute 03/21/2023 0 08    • Sodium 03/21/2023 137    • Potassium 03/21/2023 3 9    • Chloride 03/21/2023 102    • CO2 03/21/2023 26    • ANION GAP 03/21/2023 9    • BUN 03/21/2023 15    • Creatinine 03/21/2023 0 99    • Glucose 03/21/2023 98    • Calcium 03/21/2023 10 3 (H)    • eGFR 03/21/2023 83    • hs TnI 0hr 03/21/2023 178 (H)    • D-Dimer, Quant 03/21/2023 0 32    • Ventricular Rate 03/21/2023 65    • Atrial Rate 03/21/2023 65    • SD Interval 03/21/2023 170    • QRSD Interval 03/21/2023 94    • QT Interval 03/21/2023 406    • QTC Interval 03/21/2023 422    • P Axis 03/21/2023 53    • QRS Axis 03/21/2023 42    • T Wave Axis 03/21/2023 63    • SARS-CoV-2 03/21/2023 Negative    • INFLUENZA A PCR 03/21/2023 Negative    • INFLUENZA B PCR 03/21/2023 Negative    • RSV PCR 03/21/2023 Negative    • Ventricular Rate 03/21/2023 78    • Atrial Rate 03/21/2023 78    • SD Interval 03/21/2023 168    • QRSD Interval 03/21/2023 100    • QT Interval 03/21/2023 396    • QTC Interval 03/21/2023 451    • P Axis 03/21/2023 61    • QRS Axis 03/21/2023 62    • T Wave Axis 03/21/2023 94    • Ventricular Rate 03/21/2023 81    • Atrial Rate 03/21/2023 81    • SD Interval 03/21/2023 170    • QRSD Interval 03/21/2023 90    • QT Interval 03/21/2023 384    • QTC Interval 03/21/2023 446    • P Axis 03/21/2023 64    • QRS Axis 03/21/2023 44    • T Wave Axis 03/21/2023 81    Office Visit on 03/21/2023   Component Date Value   • Ventricular Rate 03/21/2023 61    • Atrial Rate 03/21/2023 61    • SD Interval 03/21/2023 174    • QRSD Interval 03/21/2023 88    • QT Interval 03/21/2023 414    • QTC Interval 03/21/2023 416    • P Axis 03/21/2023 44    • QRS Axis 03/21/2023 41    • T Wave Axis 03/21/2023 56         Imaging: XR chest pa & lateral    Result Date: 3/21/2023  Narrative: CHEST INDICATION:  R07 9: Chest pain, unspecified  COMPARISON:  4/4/2022 EXAM PERFORMED/VIEWS:  XR CHEST PA & LATERAL FINDINGS: Cardiomediastinal silhouette appears unremarkable  The lungs are clear  No pneumothorax or pleural effusion  Osseous structures appear within normal limits for patient age  Impression: No acute cardiopulmonary disease  Workstation performed: [id number]     Cardiac catheterization    Result Date: 3/27/2023  Narrative: •  Prox LAD lesion is 80% stenosed  Successful IVUS-guided PCI, proximal LAD, with a reduction in stenosis from 80% to 0% following placement of a 3 0x18mm RACHANA, post-dilated to 3 5mm proximally  Plan: DAPT, statin, follow-up appointment with cardiology, cardiac rehabilitation  Cardiac catheterization    Addendum Date: 3/21/2023 Addendum:   •  Mid LAD lesion is 80% stenosed  •  Mid Cx lesion is 60% stenosed  •  Mid RCA lesion is 95% stenosed  Multivessel coronary disease  There was a 95% complex stenosis in the mid RCA with ulceration  This was the culprit for the patient's inferior STEMI  There is also an 80% stenosis in the diffusely atherosclerotic mid LAD  The circumflex contained noncritical plaque  Successful PCI, mid RCA, with a reduction in stenosis from 95% to 0% following placement of a 4 0x24mm RACHANA  Plan: DAPT, statin, echocardiogram, beta-blockade  Return in 1 week for staged PCI of the mid LAD  There was a severe radial loop  Access was femoral  Hemostasis was achieved with a Perclose device  Result Date: 3/21/2023  Narrative: •  Mid LAD lesion is 80% stenosed  •  Mid Cx lesion is 60% stenosed  •  Mid RCA lesion is 95% stenosed  Multivessel coronary disease  There was a 95% complex stenosis in the mid RCA with ulceration  This was the culprit for the patient's inferior STEMI  There is also an 80% stenosis in the diffusely atherosclerotic mid LAD    The "circumflex contained noncritical plaque  Successful PCI, mid RCA, with a reduction in stenosis from 95% to 0% following placement of a 4 0x24mm RACHANA  Plan: DAPT, statin, echocardiogram, beta-blockade  Return in 1 week for staged PCI of the mid LAD  Echo complete w/ contrast if indicated    Result Date: 3/22/2023  Narrative: •  Left Ventricle: Left ventricular cavity size is normal  Wall thickness is normal  There is no concentric hypertrophy  The left ventricular ejection fraction is 60%  Systolic function is normal  Diastolic function is mildly abnormal, consistent with grade I (abnormal) relaxation  •  IVS: There is sigmoid appearance of the septum  •  Right Ventricle: Right ventricular cavity size is normal  Systolic function is normal  •  Mitral Valve: There is mild annular calcification  •  Tricuspid Valve: There is mild to moderate regurgitation  •  The following segments are hypokinetic: basal inferior  •  All other segments are normal        Review of Systems:  Review of Systems   Constitutional: Negative for chills, diaphoresis, fatigue and fever  HENT: Negative for trouble swallowing and voice change  Eyes: Negative for pain and redness  Respiratory: Negative for shortness of breath and wheezing  Cardiovascular: Negative for chest pain, palpitations and leg swelling  Gastrointestinal: Negative for abdominal pain, constipation, diarrhea, nausea and vomiting  Musculoskeletal: Negative for neck pain and neck stiffness  Skin: Negative for rash  All other systems reviewed and are negative  Vitals:    04/05/23 1321   BP: 122/82   Pulse: 76   Weight: 86 2 kg (190 lb)   Height: 5' 9\" (1 753 m)     Vitals:    04/05/23 1321   Weight: 86 2 kg (190 lb)     Height: 5' 9\" (175 3 cm)     Physical Exam:  Physical Exam  Vitals reviewed  Constitutional:       General: He is not in acute distress  Appearance: Normal appearance  He is not diaphoretic     HENT:      Head: Normocephalic and " atraumatic  Eyes:      General:         Right eye: No discharge  Left eye: No discharge  Neck:      Comments: Trachea midline, no JVD present  Cardiovascular:      Rate and Rhythm: Normal rate and regular rhythm  Heart sounds: No friction rub  Pulmonary:      Effort: Pulmonary effort is normal  No respiratory distress  Breath sounds: No wheezing  Abdominal:      General: Bowel sounds are normal       Palpations: Abdomen is soft  Tenderness: There is no abdominal tenderness  There is no rebound  Musculoskeletal:      Right lower leg: No edema  Left lower leg: No edema  Skin:     General: Skin is warm and dry  Neurological:      Mental Status: He is alert  Comments: Awake, alert, able to answer question appropriately, able to move extremities bilaterally     Psychiatric:         Mood and Affect: Mood normal          Behavior: Behavior normal

## 2023-04-06 ENCOUNTER — TELEPHONE (OUTPATIENT)
Dept: CARDIOLOGY CLINIC | Facility: CLINIC | Age: 58
End: 2023-04-06

## 2023-04-06 ENCOUNTER — CLINICAL SUPPORT (OUTPATIENT)
Dept: CARDIAC REHAB | Facility: HOSPITAL | Age: 58
End: 2023-04-06

## 2023-04-06 DIAGNOSIS — Z95.5 S/P CORONARY ARTERY STENT PLACEMENT: ICD-10-CM

## 2023-04-06 NOTE — TELEPHONE ENCOUNTER
Called and left message that disability papers with be competed today and faxed to that department that is on form, if wants a copy or papers to  at office

## 2023-04-06 NOTE — TELEPHONE ENCOUNTER
Chavo Chand, [id number] calling in reference to my FMLA and my short term disability paperwork which has to be filled out [phone]  Thank you

## 2023-04-24 ENCOUNTER — CLINICAL SUPPORT (OUTPATIENT)
Dept: CARDIAC REHAB | Facility: HOSPITAL | Age: 58
End: 2023-04-24

## 2023-04-24 DIAGNOSIS — Z95.5 S/P CORONARY ARTERY STENT PLACEMENT: Primary | ICD-10-CM

## 2023-04-26 ENCOUNTER — CLINICAL SUPPORT (OUTPATIENT)
Dept: CARDIAC REHAB | Facility: HOSPITAL | Age: 58
End: 2023-04-26

## 2023-04-26 DIAGNOSIS — Z95.5 S/P CORONARY ARTERY STENT PLACEMENT: ICD-10-CM

## 2023-04-28 ENCOUNTER — CLINICAL SUPPORT (OUTPATIENT)
Dept: CARDIAC REHAB | Facility: HOSPITAL | Age: 58
End: 2023-04-28

## 2023-04-28 DIAGNOSIS — Z95.5 S/P CORONARY ARTERY STENT PLACEMENT: ICD-10-CM

## 2023-05-01 ENCOUNTER — CLINICAL SUPPORT (OUTPATIENT)
Dept: CARDIAC REHAB | Facility: HOSPITAL | Age: 58
End: 2023-05-01

## 2023-05-01 DIAGNOSIS — Z95.5 S/P CORONARY ARTERY STENT PLACEMENT: ICD-10-CM

## 2023-05-03 ENCOUNTER — CLINICAL SUPPORT (OUTPATIENT)
Dept: CARDIAC REHAB | Facility: HOSPITAL | Age: 58
End: 2023-05-03

## 2023-05-03 DIAGNOSIS — Z95.5 S/P CORONARY ARTERY STENT PLACEMENT: Primary | ICD-10-CM

## 2023-05-03 NOTE — PROGRESS NOTES
Cardiac Rehabilitation Plan of Care   30 Day Reassessment          Today's date: 5/3/2023   # of Exercise Sessions Completed:   Patient name: Flavia Nicole      : 1965  Age: 60 y o  MRN: [id number]  Referring Physician: CARMEN Hyde  Cardiologist: Dr Dina Clifford DO  Provider: [facility]FFILIATED WITH [facility]  Clinician: Tiki Yan, MPT, CCRP    Dx: STEMI  S/P PCI w/RACHANA to mid RCA ( 3/21) and LAD (3/27)    Date of onset: 3/21/2023      SUMMARY OF PROGRESS: Lars Chun has completed 13 exercise sessions  at Cardiac Rehab  Patient admits to 100% medication compliance  Patient no longer reports any physical limitations  Patient does not require supplemental O2 or CPAP at this time  Patient continues to deny anxiety and depression  Currently, the patient performs a walking program   Patient was counseled on exercise guidelines including 1-2 days of moderate exercise on opposite days of Cardiac Rehab, as tolerated  Patient has been receiving weekly education  Refer to [facility]  documentation for a list of completed topics  At rest, the patient's HR was 92, O2Sat was 99%, and BP was 122/58  During exercise, the patient's peak HR was 136, while peak BP was 158/60  During the test he was able to maintain his 02 Sat at 94% or greater  Patient rated the test a 1 on a 1-10 RPE Scale  He denied any WYMAN  During recovery, the patient's HR was 89, O2Sat was 99%, and BP was 124/64  Patient denied having any other symptoms during exercise  Additionally, the patient's telemetry revealed NSR  Patient had correct hemodynamic responses to activity  Patient has goals of upgrade HEP, reduce risk profile, improve Lipid Panel,  reduce reliance on medication and attain his maximal level of function  Patient has been performing a walking program, an 11# weight loss, able to ambulate long distances and returned to work full time/duty  Patient has been compliant attending his CR sessions  He gives great effort during each visit  He has returned to work full time/duty  He has been asymptomatic and not required use of Nitro tabs since beginning CR  Stress level remains low at 2/10  He has had an 11# weight loss  He has correct hemodynamic respones to the activity  He is able to work at a 6 19 MET Level  He rates his program a 4/10 on a 1-10 RPE Scale  He is able to achieve 3 75 miles on the [facility]  His program will continue to be progressed as he is able to tolerate  Medication compliance: Yes   Comments: Pt reports to be compliant with medications  Fall Risk: Low   Comments: Ambulates with a steady gait with no assist device and Denies a fall in the past 6 months    EKG Interpretation: NSR      EXERCISE ASSESSMENT and PLAN    Exercise Prescription:      Frequency: 2-3 days/week   Supplement with home exercise 3+ days/wk as tolerated       Minutes: 40-45        METS: 4 91 to 6 19            HR: 110-120   RPE: 4-5/10        Modalities: Treadmill, Airdyne bike, UBE, Elliptical, NuStep and Recumbent bike      30 Day Goals for Exercise Progression:    Frequency: 2-3 days/week of cardiac rehab       Supplement with home exercise 5+ days/wk as tolerated    Minutes: 45-50                        >150 mins/wk of moderate intensity exercise   METS: 6 to 7 5   HR: 110-120   RPE: 4-6/10   Modalities: Treadmill, Airdyne bike, UBE, Lifecycle, Elliptical, Rower, NuStep and Recumbent bike    Strength trainin-3 days / week  12-15 repetitions  1-2 sets per modality    Modalities: Leg Press and UE Free Weights    Home Exercise: No formal HEP; patient states he does much walking at home and work each day       Goals: 10% improvement in functional capacity - based on max METs achieved in fitness assessment, improved DASI score by 10%, Increase in exercise capacity by 40% - based on peak METs tolerated in cardiac rehab exercise session, Exercise 5 days/wk, >150mins/wk of moderate intensity exercise, Resume ADLs with increased strength, Return to work unrestricted, Attend Rehab regularly and start a home exercise program    Progression Toward Goals: Will continue to educate and progress as tolerated , Goals met: aatends CR regualrly, increased MET Level ability and improved physical fitness       Education: benefit of exercise for CAD risk factors, home exercise guidelines, AHA guidelines to achieve >150 mins/wk of moderate exercise, RPE scale and class: Risk Factors for Heart Disease     Plan:education on home exercise guidelines, home exercise 30+ mins 2 days opposite CR and Education class: Risk Factors for Heart Disease     Readiness to change: Action:  (Changing behavior)      NUTRITION ASSESSMENT AND PLAN    Weight control:    Starting weight: 191   Current weight:   180  11# weight loss since beginning CR  Waist circumference:    Starting: NA   Current:  NA    Diabetes: N/A  A1c: 5 2   last measured: 3/21/2023    Lipid management: Discussed diet and lipid management and Last lipid profile 3/22/2023  Chol 181    HDL 26  LDL 96    Goals:HDL >40, TRG <150, Improved Rate Your Plate score  >18, choose lean meat (93-95%), eliminate processed meats, reduce portion sizes of meat to 3oz or less, increase intake of fish, shellfish, cook without added fat or use vegetable oil/spray, increase intake of meatless meals, use low fat dairy, reduce cheese intake or use reduced-fat, eat 3 or more servings of whole grains a day, Eat 4-5 cups of fruits and vegetables daily, choose low sodium processed foods, eliminate butter, choose healthy snacks: light popcorn, plain pretzels and daily saturated fat intake <7%/13g    Measurable goals were based Rate Your Plate Dietary Self-Assessment  These are the areas in which the patient could score higher on the assessment  Goals include recommendations for a heart healthy diet based on American Heart Association  Progression Toward Goals: Will continue to educate and progress as tolerated , Goals not met: Tri at 296  , Goals met: A1C < 7 0, 11# weight loss and has completely changed his eating habits   Pola Giles Education: heart healthy eating  low sodium diet  hydration  education class: Heart Healthy Eating  education class:  Label Reading  Plan: Education class: Reading Food Labels, Education Class: Heart Healthy Eating, replace butter with soft spreads made with olive oil, canola or yogurt, replace refined grain bread with whole grain bread, replace unhealthy snacks with fruits & vegs, reduce portion sizes, eat fewer desserts and sweets, avoid processed foods, remove salt shaker from table and drink more water     Readiness to change: Action:  (Changing behavior)      PSYCHOSOCIAL ASSESSMENT AND PLAN    Emotional:  Depression assessment:  PHQ-9 = 7;   5-9 = Mild Depression            Anxiety measure:  BREANNE-7 = 1;   0-4  = Not anxious  Self-reported stress level:  2/10  Social support: Excellent    Goals:  Reduce perceived stress to 1-3/10, improved [facility] QOL < 27, Feelings in [facility] Score < 3, Physical Fitness in [facility] Score < 3, Social Support in [facility] Score < 3, Daily Activity in [facility] Score < 3, Social Activities in [facility] Score < 3, Overall Health in [facility] Score < 3, Change in Health in [place] Score < 3 , Increased interest in doing things and improved sleep    Progression Toward Goals: Will continue to educate and progress as tolerated , Goals met: stress level remasins low at 2/10, improved physical fitness and increase in social activities        Education: signs/sxs of depression, benefits of a positive support system, stress management techniques, class:  Stress and Your Health  and class:  Relaxation     Plan: Class: Stress and Your Health, Class: Relaxation, Practice relaxation techniques, Exercise, Spend time outdoors, Enjoy a hobby and Read a Book     Readiness to change: Action:  (Changing behavior)      OTHER CORE COMPONENTS     Tobacco:   Social History     Tobacco Use   Smoking Status Never   Smokeless Tobacco Former       Tobacco Use Intervention:   N/A:  Patient is a non-smoker     Anginal Symptoms:  chest pressure, RUE pain and symptoms of indigestion at time of the event  NTG use: Compliant with carrying NTG, Understands proper use and has not used since beginning CR  Patient denies any symptoms while performing work duties  Blood pressure:    Restin/58   Exercise: 152/66   Recovery: 120/64    Goals: consistent BP < 130/80, reduced dietary sodium <2300mg, moderate intensity exercise >150 mins/wk, medication compliance, reduce number of medications  needed for BP control and reduced angina     Progression Toward Goals: Reviewed Pt goals and determined plan of care, Will continue to educate and progress as tolerated , Goals met: medication compliance, no c/o angina, reduced need for Nitro tabs and reduced daily sodium intake        Education:  understanding high blood pressure and it's relationship to CAD, low sodium diet and HTN, proper use of sublingual NTG, Education class:  Common Heart Medications and Education class: Understanding Heart Disease     Plan: Class: Understanding Heart Disease, Class: Common Heart Medications, avoid places with second hand smoke, Avoid Processed foods, engage in regular exercise, eliminate salt shaker at the table and use salt substitutes     Readiness to change: Action:  (Changing behavior)

## 2023-05-05 ENCOUNTER — CLINICAL SUPPORT (OUTPATIENT)
Dept: CARDIAC REHAB | Facility: HOSPITAL | Age: 58
End: 2023-05-05

## 2023-05-05 DIAGNOSIS — Z95.5 S/P CORONARY ARTERY STENT PLACEMENT: ICD-10-CM

## 2023-05-08 ENCOUNTER — APPOINTMENT (OUTPATIENT)
Dept: CARDIAC REHAB | Facility: HOSPITAL | Age: 58
End: 2023-05-08
Payer: COMMERCIAL

## 2023-05-10 ENCOUNTER — CLINICAL SUPPORT (OUTPATIENT)
Dept: CARDIAC REHAB | Facility: HOSPITAL | Age: 58
End: 2023-05-10

## 2023-05-10 DIAGNOSIS — Z95.5 S/P CORONARY ARTERY STENT PLACEMENT: ICD-10-CM

## 2023-05-12 ENCOUNTER — CLINICAL SUPPORT (OUTPATIENT)
Dept: CARDIAC REHAB | Facility: HOSPITAL | Age: 58
End: 2023-05-12

## 2023-05-12 DIAGNOSIS — Z95.5 S/P CORONARY ARTERY STENT PLACEMENT: ICD-10-CM

## 2023-05-15 ENCOUNTER — APPOINTMENT (OUTPATIENT)
Dept: CARDIAC REHAB | Facility: HOSPITAL | Age: 58
End: 2023-05-15
Payer: COMMERCIAL

## 2023-05-17 ENCOUNTER — CLINICAL SUPPORT (OUTPATIENT)
Dept: CARDIAC REHAB | Facility: HOSPITAL | Age: 58
End: 2023-05-17

## 2023-05-17 DIAGNOSIS — Z95.5 S/P CORONARY ARTERY STENT PLACEMENT: ICD-10-CM

## 2023-05-19 ENCOUNTER — CLINICAL SUPPORT (OUTPATIENT)
Dept: CARDIAC REHAB | Facility: HOSPITAL | Age: 58
End: 2023-05-19

## 2023-05-19 DIAGNOSIS — Z95.5 S/P CORONARY ARTERY STENT PLACEMENT: ICD-10-CM

## 2023-05-22 ENCOUNTER — CLINICAL SUPPORT (OUTPATIENT)
Dept: CARDIAC REHAB | Facility: HOSPITAL | Age: 58
End: 2023-05-22

## 2023-05-22 DIAGNOSIS — Z95.5 S/P CORONARY ARTERY STENT PLACEMENT: ICD-10-CM

## 2023-05-24 ENCOUNTER — CLINICAL SUPPORT (OUTPATIENT)
Dept: CARDIAC REHAB | Facility: HOSPITAL | Age: 58
End: 2023-05-24

## 2023-05-24 DIAGNOSIS — Z95.5 S/P CORONARY ARTERY STENT PLACEMENT: ICD-10-CM

## 2023-05-26 ENCOUNTER — CLINICAL SUPPORT (OUTPATIENT)
Dept: CARDIAC REHAB | Facility: HOSPITAL | Age: 58
End: 2023-05-26

## 2023-05-26 DIAGNOSIS — Z95.5 S/P CORONARY ARTERY STENT PLACEMENT: ICD-10-CM

## 2023-05-29 ENCOUNTER — APPOINTMENT (OUTPATIENT)
Dept: CARDIAC REHAB | Facility: HOSPITAL | Age: 58
End: 2023-05-29
Payer: COMMERCIAL

## 2023-05-30 ENCOUNTER — CLINICAL SUPPORT (OUTPATIENT)
Dept: CARDIAC REHAB | Facility: HOSPITAL | Age: 58
End: 2023-05-30

## 2023-05-30 DIAGNOSIS — Z95.5 S/P CORONARY ARTERY STENT PLACEMENT: Primary | ICD-10-CM

## 2023-05-31 ENCOUNTER — APPOINTMENT (OUTPATIENT)
Dept: CARDIAC REHAB | Facility: HOSPITAL | Age: 58
End: 2023-05-31
Payer: COMMERCIAL

## 2023-05-31 NOTE — PROGRESS NOTES
Cardiac Rehabilitation Plan of Care   60 Day Reassessment          Today's date: 2023   # of Exercise Sessions Completed:   Patient name: Sarah Finn      : 1965  Age: 60 y o  MRN: [id number]  Referring Physician: CARMEN Álvarez  Cardiologist: Dr Tonya Wright DO  Provider: [facility]  Clinician: Ridge Finney, MPT, CCRP    Dx: STEMI  S/P PCI w/RACHANA to mid RCA ( 3/21) and LAD (3/27)    Date of onset: 3/21/2023      SUMMARY OF PROGRESS: Chantelle Cummings has completed 22 exercise sessions  at Cardiac Rehab  Patient admits to 100% medication compliance  Patient no longer reports any physical limitations  Patient does not require supplemental O2 or CPAP at this time  Patient continues to deny anxiety and depression  Currently, the patient performs a walking program   Patient was counseled on exercise guidelines including 1-2 days of moderate exercise on opposite days of Cardiac Rehab, as tolerated  Patient has been receiving weekly education  Refer to [facility]  documentation for a list of completed topics  At rest, the patient's HR was 78, O2Sat was 99%, and BP was 124/54  During exercise, the patient's peak HR was 136, while peak BP was 160/72  During the test he was able to maintain his 02 Sat at 94% or greater  Patient rates his program a 4 on a 1-10 RPE Scale  He denied any WYMAN  During recovery, the patient's HR was 92, O2Sat was 99%, and BP was 110/64  Patient denied having any other symptoms during exercise  Additionally, the patient's telemetry revealed NSR  Patient had correct hemodynamic responses to activity  Patient has goals of upgrade HEP, reduce risk profile, improve Lipid Panel,  reduce reliance on medication and attain his maximal level of function  Patient has been performing a walking program, an 11# weight loss, able to ambulate long distances and returned to work full time/duty  Patient has been compliant attending his CR sessions   He gives great effort during each visit  He has returned to work full time/duty  He has been asymptomatic and not required use of Nitro tabs since beginning CR  Stress level remains low at 2/10  He has had an 11# weight loss  He has correct hemodynamic respones to the activity  He is able to work at a 6 19 MET Level  He rates his program a 4/10 on a 1-10 RPE Scale  He is able to achieve 3 75 miles on the [facility]  His program will continue to be progressed as he is able to tolerate  Medication compliance: Yes   Comments: Pt reports to be compliant with medications  Fall Risk: Low   Comments: Ambulates with a steady gait with no assist device and Denies a fall in the past 6 months    EKG Interpretation: NSR      EXERCISE ASSESSMENT and PLAN    Exercise Prescription:      Frequency: 2-3 days/week   Supplement with home exercise 3+ days/wk as tolerated       Minutes: 45-50       METS:  6 19            HR: 110-120   RPE: 4-5/10        Modalities: Treadmill, Airdyne bike, UBE, Elliptical, NuStep and Recumbent bike      30 Day Goals for Exercise Progression:    Frequency: 2-3 days/week of cardiac rehab       Supplement with home exercise 5+ days/wk as tolerated    Minutes: 45-50                        >150 mins/wk of moderate intensity exercise   METS: 6 to 7 5   HR: 110-120   RPE: 4-6/10   Modalities: Treadmill, Airdyne bike, UBE, Lifecycle, Elliptical, Rower, NuStep and Recumbent bike    Strength trainin-3 days / week  12-15 repetitions  1-2 sets per modality    Modalities: Leg Press and UE Free Weights    Home Exercise: Patient states he does much walking at home and work each day       Goals: 10% improvement in functional capacity - based on max METs achieved in fitness assessment, improved DASI score by 10%, Increase in exercise capacity by 40% - based on peak METs tolerated in cardiac rehab exercise session, Exercise 5 days/wk, >150mins/wk of moderate intensity exercise, Resume ADLs with increased strength, Return to work unrestricted, Attend Rehab regularly and start a home exercise program    Progression Toward Goals: Will continue to educate and progress as tolerated , Goals met: attends CR regualrly, resumed all ADLs, increased MET Level ability and improved physical fitness       Education: benefit of exercise for CAD risk factors, home exercise guidelines, AHA guidelines to achieve >150 mins/wk of moderate exercise, RPE scale and class: Risk Factors for Heart Disease     Plan:education on home exercise guidelines, home exercise 30+ mins 2 days opposite CR and Education class: Risk Factors for Heart Disease     Readiness to change: Action:  (Changing behavior)      NUTRITION ASSESSMENT AND PLAN    Weight control:    Starting weight: 191   Current weight:   180  11# weight loss since beginning CR  Patient is maintaining this healthy weight  Waist circumference:    Starting: NA   Current:  NA    Diabetes: N/A  A1c: 5 2   last measured: 3/21/2023    Lipid management: Discussed diet and lipid management and Last lipid profile 3/22/2023  Chol 181    HDL 26  LDL 96    Goals:HDL >40, TRG <150, Improved Rate Your Plate score  >75, choose lean meat (93-95%), eliminate processed meats, reduce portion sizes of meat to 3oz or less, increase intake of fish, shellfish, cook without added fat or use vegetable oil/spray, increase intake of meatless meals, use low fat dairy, reduce cheese intake or use reduced-fat, eat 3 or more servings of whole grains a day, Eat 4-5 cups of fruits and vegetables daily, choose low sodium processed foods, eliminate butter, choose healthy snacks: light popcorn, plain pretzels and daily saturated fat intake <7%/13g    Measurable goals were based Rate Your Plate Dietary Self-Assessment  These are the areas in which the patient could score higher on the assessment  Goals include recommendations for a heart healthy diet based on American Heart Association  Progression Toward Goals:  Will continue to educate and progress as tolerated , Goals not met: Tri at 296   , Goals met: A1C < 7 0, 11# weight loss and has completely changed his eating habits   Aldo Hdez Increased water intake and reduced daily sodium  Education: heart healthy eating  low sodium diet  hydration  education class: Heart Healthy Eating  education class:  Label Reading  Plan: Education class: Reading Food Labels, Education Class: Heart Healthy Eating, replace butter with soft spreads made with olive oil, canola or yogurt, replace refined grain bread with whole grain bread, replace unhealthy snacks with fruits & vegs, reduce portion sizes, eat fewer desserts and sweets, avoid processed foods, remove salt shaker from table and drink more water     Readiness to change: Action:  (Changing behavior)      PSYCHOSOCIAL ASSESSMENT AND PLAN    Emotional:  Depression assessment:  PHQ-9 = 7;   5-9 = Mild Depression            Anxiety measure:  BREANNE-7 = 1;   0-4  = Not anxious  Self-reported stress level:  2/10  Social support: Excellent    Goals:  Reduce perceived stress to 1-3/10, improved [facility] QOL < 27, Feelings in [facility] Score < 3, Physical Fitness in [facility] Score < 3, Social Support in [facility] Score < 3, Daily Activity in [facility] Score < 3, Social Activities in [facility] Score < 3, Overall Health in [facility] Score < 3, Change in Health in [place] Score < 3 , Increased interest in doing things and improved sleep    Progression Toward Goals: Will continue to educate and progress as tolerated , Goals met: stress level remains low at 2/10, imporved sleep, improved physical fitness and increase in social activities        Education: signs/sxs of depression, benefits of a positive support system, stress management techniques, class:  Stress and Your Health  and class:  Relaxation     Plan: Class: Stress and Your Health, Class: Relaxation, Practice relaxation techniques, Exercise, Spend time outdoors, Enjoy a hobby and Read a Book     Readiness to change: Action: (Changing behavior)      OTHER CORE COMPONENTS     Tobacco:   Social History     Tobacco Use   Smoking Status Never   Smokeless Tobacco Former       Tobacco Use Intervention:   N/A:  Patient is a non-smoker     Anginal Symptoms:  chest pressure, RUE pain and symptoms of indigestion at time of the event  NTG use: Compliant with carrying NTG, Understands proper use and has not used since beginning CR  Patient denies any symptoms while performing work duties  Blood pressure:    Restin/54   Exercise: 160/72   Recovery: 110/64    Goals: consistent BP < 130/80, reduced dietary sodium <2300mg, moderate intensity exercise >150 mins/wk, medication compliance, reduce number of medications  needed for BP control and reduced angina     Progression Toward Goals: Reviewed Pt goals and determined plan of care, Will continue to educate and progress as tolerated , Goals met: medication compliance, no c/o angina, reduced need for Nitro tabs and reduced daily sodium intake        Education:  understanding high blood pressure and it's relationship to CAD, low sodium diet and HTN, proper use of sublingual NTG, Education class:  Common Heart Medications and Education class: Understanding Heart Disease     Plan: Class: Understanding Heart Disease, Class: Common Heart Medications, avoid places with second hand smoke, Avoid Processed foods, engage in regular exercise, eliminate salt shaker at the table and use salt substitutes     Readiness to change: Action:  (Changing behavior)

## 2023-06-02 ENCOUNTER — CLINICAL SUPPORT (OUTPATIENT)
Dept: CARDIAC REHAB | Facility: HOSPITAL | Age: 58
End: 2023-06-02

## 2023-06-02 DIAGNOSIS — Z95.5 S/P CORONARY ARTERY STENT PLACEMENT: ICD-10-CM

## 2023-06-05 ENCOUNTER — CLINICAL SUPPORT (OUTPATIENT)
Dept: CARDIAC REHAB | Facility: HOSPITAL | Age: 58
End: 2023-06-05
Payer: COMMERCIAL

## 2023-06-05 DIAGNOSIS — Z95.5 S/P CORONARY ARTERY STENT PLACEMENT: ICD-10-CM

## 2023-06-05 PROCEDURE — 93798 PHYS/QHP OP CAR RHAB W/ECG: CPT

## 2023-06-07 ENCOUNTER — CLINICAL SUPPORT (OUTPATIENT)
Dept: CARDIAC REHAB | Facility: HOSPITAL | Age: 58
End: 2023-06-07
Payer: COMMERCIAL

## 2023-06-07 DIAGNOSIS — Z95.5 S/P CORONARY ARTERY STENT PLACEMENT: ICD-10-CM

## 2023-06-07 PROCEDURE — 93798 PHYS/QHP OP CAR RHAB W/ECG: CPT

## 2023-06-09 ENCOUNTER — CLINICAL SUPPORT (OUTPATIENT)
Dept: CARDIAC REHAB | Facility: HOSPITAL | Age: 58
End: 2023-06-09
Payer: COMMERCIAL

## 2023-06-09 DIAGNOSIS — Z95.5 S/P CORONARY ARTERY STENT PLACEMENT: ICD-10-CM

## 2023-06-09 PROCEDURE — 93798 PHYS/QHP OP CAR RHAB W/ECG: CPT

## 2023-06-12 ENCOUNTER — CLINICAL SUPPORT (OUTPATIENT)
Dept: CARDIAC REHAB | Facility: HOSPITAL | Age: 58
End: 2023-06-12
Payer: COMMERCIAL

## 2023-06-12 DIAGNOSIS — Z95.5 S/P CORONARY ARTERY STENT PLACEMENT: ICD-10-CM

## 2023-06-12 PROCEDURE — 93798 PHYS/QHP OP CAR RHAB W/ECG: CPT

## 2023-06-14 ENCOUNTER — CLINICAL SUPPORT (OUTPATIENT)
Dept: CARDIAC REHAB | Facility: HOSPITAL | Age: 58
End: 2023-06-14
Payer: COMMERCIAL

## 2023-06-14 DIAGNOSIS — Z95.5 S/P CORONARY ARTERY STENT PLACEMENT: ICD-10-CM

## 2023-06-14 PROCEDURE — 93798 PHYS/QHP OP CAR RHAB W/ECG: CPT

## 2023-06-16 ENCOUNTER — CLINICAL SUPPORT (OUTPATIENT)
Dept: CARDIAC REHAB | Facility: HOSPITAL | Age: 58
End: 2023-06-16
Payer: COMMERCIAL

## 2023-06-16 DIAGNOSIS — I21.3 STEMI (ST ELEVATION MYOCARDIAL INFARCTION) (HCC): ICD-10-CM

## 2023-06-16 DIAGNOSIS — Z95.5 S/P CORONARY ARTERY STENT PLACEMENT: ICD-10-CM

## 2023-06-16 PROCEDURE — 93798 PHYS/QHP OP CAR RHAB W/ECG: CPT

## 2023-06-16 RX ORDER — ATORVASTATIN CALCIUM 80 MG/1
80 TABLET, FILM COATED ORAL EVERY EVENING
Qty: 30 TABLET | Refills: 5 | Status: SHIPPED | OUTPATIENT
Start: 2023-06-16

## 2023-06-16 NOTE — TELEPHONE ENCOUNTER
Mel [facility]  [facility]  Lipitor 80 mg  1 tab daily   30 with 5 refills     Metoprolol 25 mg  1 tab every 12 hours   60 with 5 refills     [facility]     Dr Milton Service

## 2023-06-19 ENCOUNTER — CLINICAL SUPPORT (OUTPATIENT)
Dept: CARDIAC REHAB | Facility: HOSPITAL | Age: 58
End: 2023-06-19
Payer: COMMERCIAL

## 2023-06-19 DIAGNOSIS — Z95.5 S/P CORONARY ARTERY STENT PLACEMENT: ICD-10-CM

## 2023-06-19 PROCEDURE — 93798 PHYS/QHP OP CAR RHAB W/ECG: CPT

## 2023-06-21 ENCOUNTER — CLINICAL SUPPORT (OUTPATIENT)
Dept: CARDIAC REHAB | Facility: HOSPITAL | Age: 58
End: 2023-06-21
Payer: COMMERCIAL

## 2023-06-21 DIAGNOSIS — Z95.5 S/P CORONARY ARTERY STENT PLACEMENT: ICD-10-CM

## 2023-06-21 PROCEDURE — 93798 PHYS/QHP OP CAR RHAB W/ECG: CPT

## 2023-06-23 ENCOUNTER — CLINICAL SUPPORT (OUTPATIENT)
Dept: CARDIAC REHAB | Facility: HOSPITAL | Age: 58
End: 2023-06-23
Payer: COMMERCIAL

## 2023-06-23 DIAGNOSIS — Z95.5 S/P CORONARY ARTERY STENT PLACEMENT: ICD-10-CM

## 2023-06-23 PROCEDURE — 93798 PHYS/QHP OP CAR RHAB W/ECG: CPT

## 2023-06-26 ENCOUNTER — CLINICAL SUPPORT (OUTPATIENT)
Dept: CARDIAC REHAB | Facility: HOSPITAL | Age: 58
End: 2023-06-26
Payer: COMMERCIAL

## 2023-06-26 DIAGNOSIS — Z95.5 S/P CORONARY ARTERY STENT PLACEMENT: Primary | ICD-10-CM

## 2023-06-26 PROCEDURE — 93798 PHYS/QHP OP CAR RHAB W/ECG: CPT

## 2023-06-28 ENCOUNTER — TELEPHONE (OUTPATIENT)
Dept: CARDIAC REHAB | Facility: HOSPITAL | Age: 58
End: 2023-06-28

## 2023-06-28 ENCOUNTER — APPOINTMENT (OUTPATIENT)
Dept: CARDIAC REHAB | Facility: HOSPITAL | Age: 58
End: 2023-06-28
Payer: COMMERCIAL

## 2023-06-28 NOTE — PROGRESS NOTES
Cardiac Rehabilitation Plan of Care   90 Day Reassessment          Today's date: 2023   # of Exercise Sessions Completed:   Patient name: Yony Dangelo      : 1965  Age: 60 y o  MRN: [id number]  Referring Physician: CARMEN Mccollum  Cardiologist: Dr Elissa Dowling DO  Provider: Wandalee Severin  Clinician: Skyla Noble, MPT, CCRP    Dx: STEMI  S/P PCI w/RACHANA to mid RCA ( 3/21) and LAD (3/27)    Date of onset: 3/21/2023      SUMMARY OF PROGRESS: Lenny Quinonez has completed 33 exercise sessions  at Cardiac Rehab  Patient admits to 100% medication compliance  Patient no longer reports any physical limitations  Patient does not require supplemental O2 or CPAP at this time  Patient continues to deny anxiety and depression  Currently, the patient performs a walking program   Patient was counseled on exercise guidelines including 1-2 days of moderate exercise on opposite days of Cardiac Rehab, as tolerated  Patient has been receiving weekly education  Refer to [facility]  documentation for a list of completed topics  At rest, the patient's HR was 76, O2Sat was 99%, and BP was 120/52  During exercise, the patient's peak HR was 126, while peak BP was 144/72  During the test he was able to maintain his 02 Sat at 94% or greater  Patient rates his program a 4 on a 1-10 RPE Scale  He denied any WYMAN  During recovery, the patient's HR was 83, O2Sat was 99%, and BP was 112/52  Patient denied having any other symptoms during exercise  Additionally, the patient's telemetry revealed NSR  Patient had correct hemodynamic responses to activity  Patient has obtained his short term goals  He has upgraded his  HEP, reduce risk profile, improved Lipid Panel,  reduce reliance on medication and attain his maximal level of function  Patient has been performing a walking program, an 11# weight loss, able to ambulate long distances and returned to work full time/duty  Patient has been compliant attending his CR sessions   He gives great effort during each visit  He has returned to work full time/duty  He has been asymptomatic and not required use of Nitro tabs since beginning CR  Stress level remains low at 2/10 or less  He has had an 11# weight loss  He has correct hemodynamic respones to the activity  He is able to work at a 6 19 MET Level  He rates his program a 4/10 on a 1-10 RPE Scale  He is able to achieve 3 75 miles on the [facility] in a 15 minute period  Zeina Warren will be performing follow up testing over the next few sessions in preparation for discharge  Medication compliance: Yes   Comments: Pt reports to be compliant with medications  Fall Risk: Low   Comments: Ambulates with a steady gait with no assist device and Denies a fall in the past 6 months    EKG Interpretation: NSR      EXERCISE ASSESSMENT and PLAN    Exercise Prescription:      Frequency: 2-3 days/week   Supplement with home exercise 3+ days/wk as tolerated       Minutes: 45-50       METS:  6 19            HR: 110-120   RPE: 4-5/10        Modalities: Treadmill, Airdyne bike, UBE, Elliptical, NuStep and Recumbent bike      30 Day Goals for Exercise Progression:    Frequency: 2-3 days/week of cardiac rehab       Supplement with home exercise 5+ days/wk as tolerated    Minutes: 45-50                        >150 mins/wk of moderate intensity exercise   METS: 6 to 7 5   HR: 110-120   RPE: 4-6/10   Modalities: Treadmill, Airdyne bike, UBE, Lifecycle, Elliptical, Rower, NuStep and Recumbent bike    Strength trainin-3 days / week  12-15 repetitions  1-2 sets per modality    Modalities: Leg Press and UE Free Weights    Home Exercise: Patient states he does much walking at home and work each day  He walks several miles daily as well as multiple flights of stairs       Goals: 10% improvement in functional capacity - based on max METs achieved in fitness assessment, improved DASI score by 10%, Increase in exercise capacity by 40% - based on peak METs tolerated in cardiac rehab exercise session, Exercise 5 days/wk, >150mins/wk of moderate intensity exercise, Resume ADLs with increased strength, Return to work unrestricted, Attend Rehab regularly and start a home exercise program    Progression Toward Goals: Will continue to educate and progress as tolerated , Goals met: attends CR regularly, working full time/duty, resumed all ADLs, increased MET Level ability and improved physical fitness   , Patient will be encouraged to focus on lifestyle modifications following discharge  Education: benefit of exercise for CAD risk factors, home exercise guidelines, AHA guidelines to achieve >150 mins/wk of moderate exercise, RPE scale and class: Risk Factors for Heart Disease     Plan:education on home exercise guidelines, home exercise 30+ mins 2 days opposite CR and Education class: Risk Factors for Heart Disease     Readiness to change: Action:  (Changing behavior)      NUTRITION ASSESSMENT AND PLAN    Weight control:    Starting weight: 191   Current weight:   180  11# weight loss since beginning CR  Patient is maintaining this healthy weight       Waist circumference:    Starting: NA   Current:  NA    Diabetes: N/A  A1c: 5 2   last measured: 3/21/2023    Lipid management: Discussed diet and lipid management and Last lipid profile 3/22/2023  Chol 181    HDL 26  LDL 96    Goals:HDL >40, TRG <150, Improved Rate Your Plate score  >58, choose lean meat (93-95%), eliminate processed meats, reduce portion sizes of meat to 3oz or less, increase intake of fish, shellfish, cook without added fat or use vegetable oil/spray, increase intake of meatless meals, use low fat dairy, reduce cheese intake or use reduced-fat, eat 3 or more servings of whole grains a day, Eat 4-5 cups of fruits and vegetables daily, choose low sodium processed foods, eliminate butter, choose healthy snacks: light popcorn, plain pretzels and daily saturated fat intake <7%/13g    Measurable goals were based Rate Your Plate Dietary Self-Assessment  These are the areas in which the patient could score higher on the assessment  Goals include recommendations for a heart healthy diet based on American Heart Association  Progression Toward Goals: Will continue to educate and progress as tolerated , Goals not met: Tri at 296   , Goals met: A1C < 7 0, 11# weight loss and has completely changed his eating habits   Greta Harvey Increased water intake and reduced daily sodium  Patient states he is strictly following a cardiac diet  Education: heart healthy eating  low sodium diet  hydration  education class: Heart Healthy Eating  education class:  Label Reading  Plan: Education class: Reading Food Labels, Education Class: Heart Healthy Eating, replace butter with soft spreads made with olive oil, canola or yogurt, replace refined grain bread with whole grain bread, replace unhealthy snacks with fruits & vegs, reduce portion sizes, eat fewer desserts and sweets, avoid processed foods, remove salt shaker from table and drink more water     Readiness to change: Action:  (Changing behavior)      PSYCHOSOCIAL ASSESSMENT AND PLAN    Emotional:  Depression assessment:  PHQ-9 = 7;   5-9 = Mild Depression            Anxiety measure:  BREANNE-7 = 1;   0-4  = Not anxious  Self-reported stress level:  2/10 or less  Social support: Excellent    Goals:  Reduce perceived stress to 1-3/10, improved [facility] QOL < 27, Feelings in [facility] Score < 3, Physical Fitness in [facility] Score < 3, Social Support in [facility] Score < 3, Daily Activity in [facility] Score < 3, Social Activities in [facility] Score < 3, Overall Health in [facility] Score < 3, Change in Health in [place] Score < 3 , Increased interest in doing things and improved sleep    Progression Toward Goals: Will continue to educate and progress as tolerated , Goals met: stress level remains low at 2/10, imporved sleep, improved physical fitness and increase in social activities        Education: signs/sxs of depression, benefits of a positive support system, stress management techniques, class:  Stress and Your Health  and class:  Relaxation     Plan: Class: Stress and Your Health, Class: Relaxation, Practice relaxation techniques, Exercise, Spend time outdoors, Enjoy a hobby and Read a Book     Readiness to change: Action:  (Changing behavior)      OTHER CORE COMPONENTS     Tobacco:   Social History     Tobacco Use   Smoking Status Never   Smokeless Tobacco Former       Tobacco Use Intervention:   N/A:  Patient is a non-smoker     Anginal Symptoms:  chest pressure, RUE pain and symptoms of indigestion at time of the event  NTG use: Compliant with carrying NTG, Understands proper use and has not used since beginning CR  Patient denies any symptoms while performing work duties  Blood pressure:    Restin/52   Exercise: 144/72   Recovery: 112/54    Goals: consistent BP < 130/80, reduced dietary sodium <2300mg, moderate intensity exercise >150 mins/wk, medication compliance, reduce number of medications  needed for BP control and reduced angina     Progression Toward Goals: Reviewed Pt goals and determined plan of care, Will continue to educate and progress as tolerated , Goals met: medication compliance, no c/o angina, reduced need for Nitro tabs and reduced daily sodium intake        Education:  understanding high blood pressure and it's relationship to CAD, low sodium diet and HTN, proper use of sublingual NTG, Education class:  Common Heart Medications and Education class: Understanding Heart Disease     Plan: Class: Understanding Heart Disease, Class: Common Heart Medications, avoid places with second hand smoke, Avoid Processed foods, engage in regular exercise, eliminate salt shaker at the table and use salt substitutes     Readiness to change: Action:  (Changing behavior)

## 2023-06-28 NOTE — TELEPHONE ENCOUNTER
Left message to let patient know that cardiopulmonary rehab is closed Thursday 6/29 and Friday 6/30 due to physician coverage  Only open next week Monday 7/3 and Fri 7/7      Next scheduled appt: Monday, 7/3

## 2023-06-30 ENCOUNTER — APPOINTMENT (OUTPATIENT)
Dept: CARDIAC REHAB | Facility: HOSPITAL | Age: 58
End: 2023-06-30
Payer: COMMERCIAL

## 2023-07-03 ENCOUNTER — CLINICAL SUPPORT (OUTPATIENT)
Dept: CARDIAC REHAB | Facility: HOSPITAL | Age: 58
End: 2023-07-03
Payer: COMMERCIAL

## 2023-07-03 DIAGNOSIS — Z95.5 S/P CORONARY ARTERY STENT PLACEMENT: ICD-10-CM

## 2023-07-03 PROCEDURE — 93798 PHYS/QHP OP CAR RHAB W/ECG: CPT

## 2023-07-07 ENCOUNTER — CLINICAL SUPPORT (OUTPATIENT)
Dept: CARDIAC REHAB | Facility: HOSPITAL | Age: 58
End: 2023-07-07
Payer: COMMERCIAL

## 2023-07-07 DIAGNOSIS — Z95.5 S/P CORONARY ARTERY STENT PLACEMENT: ICD-10-CM

## 2023-07-07 PROCEDURE — 93798 PHYS/QHP OP CAR RHAB W/ECG: CPT

## 2023-07-10 ENCOUNTER — CLINICAL SUPPORT (OUTPATIENT)
Dept: CARDIAC REHAB | Facility: HOSPITAL | Age: 58
End: 2023-07-10
Payer: COMMERCIAL

## 2023-07-10 DIAGNOSIS — Z95.5 S/P CORONARY ARTERY STENT PLACEMENT: Primary | ICD-10-CM

## 2023-07-10 PROCEDURE — 93798 PHYS/QHP OP CAR RHAB W/ECG: CPT

## 2023-07-10 NOTE — PROGRESS NOTES
Cardiac Rehabilitation Plan of Care   Discharge          Today's date: 7/10/2023   # of Exercise Sessions Completed:   Patient name: Liya Lisa      : 1965  Age: 60 y.o. MRN: [id number]  Referring Physician: CARMEN Guadarrama  Cardiologist: Dr. Ever Meeks DO  Provider: [facility]  Clinician: Delaney Nichols MS    Dx: STEMI  S/P PCI w/RACHANA to mid RCA (3/21) and LAD (3/27)    Date of onset: 3/21/2023      SUMMARY OF PROGRESS: Bharath Jett has completed 36 exercise sessions  at Cardiac Rehab. Patient admits to 100% medication compliance. Patient no longer reports any physical limitations. Patient does not require supplemental O2 or CPAP at this time. Patient continues to deny anxiety and depression. Currently, the patient performs a walking program.  Patient was counseled on exercise guidelines following his discharge. Patient has been receiving weekly education. Refer to [facility] documentation for a list of completed topics. At rest, the patient's HR was 89 and BP was 100/66. During exercise, the patient's peak HR was 130, while peak BP was 160/70. Patient rates his program a 4 on a 1-10 RPE Scale. During recovery, the patient's HR was 83 and BP was 112/52. Patient denied having any symptoms during exercise. Additionally, the patient's telemetry revealed NSR. Patient had correct hemodynamic responses to activity. Patient has obtained his short term goals. He has upgraded his HEP, reduce risk profile, improved Lipid Panel,  reduce reliance on medication and attain his maximal level of function. Patient has been performing a walking program, a 12# weight loss, able to ambulate long distances and returned to work full time/duty. Bharath Jett was very compliant in attending his scheduled cardiac rehab sessions and gives great effort each session. He was able to improve his submax ETT from 3.1 METs to 7.5 METs! He consistently worked above a 6 MET level.  His DASI score improved from 5.72 METs to 9.89 METs. He had a 12# weight loss since beginning cardiac rehab. His RYP score improved from 64 to 65. His PHQ9 score improved from a 7 to a 0. His [place] score improved from a 23 to a 12. He has returned to work full time without issues. He was encouraged to continue exercising in a phase III setting. Medication compliance: Yes   Comments: Pt reports to be compliant with medications  Fall Risk: Low   Comments: Ambulates with a steady gait with no assist device and Denies a fall in the past 6 months    EKG Interpretation: NSR      EXERCISE ASSESSMENT and PLAN    Exercise Prescription:      Frequency: 3 days/week   Supplement with home exercise 3+ days/wk as tolerated       Minutes: 45       METS:  6.19            HR: 110-120   RPE: 4-5/10        Modalities: Treadmill, Airdyne bike, UBE, Elliptical, NuStep and Recumbent bike      Strength training:  Patient did not strength train. Home Exercise: Patient states he does much walking at home and work each day. He walks several miles daily as well as multiple flights of stairs. Goals: 10% improvement in functional capacity - based on max METs achieved in fitness assessment, improved DASI score by 10%, Increase in exercise capacity by 40% - based on peak METs tolerated in cardiac rehab exercise session, Exercise 5 days/wk, >150mins/wk of moderate intensity exercise, Resume ADLs with increased strength, Return to work unrestricted, Attend Rehab regularly and start a home exercise program    Progression Toward Goals:  Goals met: Submax ETT improved from 3.1 METs to 7.5 METs, DASI score improved from 5.72 METs to 9.89 METs, able to tolerate more than 6 METs consistently, obtains 150 min moderate exercise a week, returned to work unrestricted, attended rehab regularly. , Patient will be encouraged to focus on lifestyle modifications following discharge.     Education: benefit of exercise for CAD risk factors, home exercise guidelines, AHA guidelines to achieve >150 mins/wk of moderate exercise, RPE scale and class: Risk Factors for Heart Disease     Plan:education on home exercise guidelines, home exercise 30+ mins 2 days opposite CR and Education class: Risk Factors for Heart Disease     Readiness to change: Maintenance: (Maintaining the behavior change)      NUTRITION ASSESSMENT AND PLAN    Weight control:    Starting weight: 190   Current weight:   178    Waist circumference:    Starting: NA   Current:  NA    Diabetes: N/A  A1c: 5.2   last measured: 3/21/2023    Lipid management: Discussed diet and lipid management and Last lipid profile 3/22/2023  Chol 181    HDL 26  LDL 96    Goals:HDL >40, TRG <150, Improved Rate Your Plate score  >49, choose lean meat (93-95%), eliminate processed meats, reduce portion sizes of meat to 3oz or less, increase intake of fish, shellfish, cook without added fat or use vegetable oil/spray, increase intake of meatless meals, use low fat dairy, reduce cheese intake or use reduced-fat, eat 3 or more servings of whole grains a day, Eat 4-5 cups of fruits and vegetables daily, choose low sodium processed foods, eliminate butter, choose healthy snacks: light popcorn, plain pretzels and daily saturated fat intake <7%/13g    Measurable goals were based Rate Your Plate Dietary Self-Assessment. These are the areas in which the patient could score higher on the assessment. Goals include recommendations for a heart healthy diet based on American Heart Association. Progression Toward Goals: Goals not met: No updated blood work.  , Goals met: 12# weight loss, RYP score improved from 54 to 65, increased water intake, eating heart healthy diet. , Patient will be encouraged to focus on lifestyle modifications following discharge.     Education: heart healthy eating  low sodium diet  hydration  education class: Heart Healthy Eating  education class:  Label Reading  Plan: Education class: Reading Food Labels, Education Class: Heart Healthy Eating, replace butter with soft spreads made with olive oil, canola or yogurt, replace refined grain bread with whole grain bread, replace unhealthy snacks with fruits & vegs, reduce portion sizes, eat fewer desserts and sweets, avoid processed foods, remove salt shaker from table and drink more water     Readiness to change: Maintenance: (Maintaining the behavior change)      PSYCHOSOCIAL ASSESSMENT AND PLAN    Emotional:  Depression assessment:  PHQ-9 = 0;   0 =No Depression            Anxiety measure:  BREANNE-7 = 0;   0-4  = Not anxious  Self-reported stress level:  2/10  Social support: Excellent    Goals:  Reduce perceived stress to 1-3/10, improved [place] QOL < 27, Feelings in [place] Score < 3, Physical Fitness in [place] Score < 3, Social Support in [place] Score < 3, Daily Activity in [place] Score < 3, Social Activities in [place] Score < 3, Overall Health in [place] Score < 3, Change in Health in [facility] Score < 3 , Increased interest in doing things and improved sleep    Progression Toward Goals: Goals met: Stress level remains 2/10, [place] improved from 23 to 12, PHQ9 improved from 7 to 0, improved sleep, improved physical fitness. , Patient will be encouraged to focus on lifestyle modifications following discharge. Education: signs/sxs of depression, benefits of a positive support system, stress management techniques, class:  Stress and Your Health  and class:  Relaxation     Plan: Class: Stress and Your Health, Class: Relaxation, Practice relaxation techniques, Exercise, Spend time outdoors, Enjoy a hobby and Read a Book     Readiness to change: Maintenance: (Maintaining the behavior change)      OTHER CORE COMPONENTS     Tobacco:   Social History     Tobacco Use   Smoking Status Never   Smokeless Tobacco Former       Tobacco Use Intervention:   N/A:  Patient is a non-smoker     Anginal Symptoms:  chest pressure, RUE pain and symptoms of indigestion at time of the event.    NTG use: Compliant with carrying NTG, Understands proper use and has not used since beginning CR. Blood pressure:    Restin/66   Exercise: 144/72   Recovery: 112/54    Goals: consistent BP < 130/80, reduced dietary sodium <2300mg, moderate intensity exercise >150 mins/wk, medication compliance, reduce number of medications  needed for BP control and reduced angina     Progression Toward Goals: Goals met: Consistent resting BP < 130/80, reduced sodium intake, obtains 150 min/wk moderate exercise, has not taken nitro., Patient will be encouraged to focus on lifestyle modifications following discharge.     Education:  understanding high blood pressure and it's relationship to CAD, low sodium diet and HTN, proper use of sublingual NTG, Education class:  Common Heart Medications and Education class: Understanding Heart Disease     Plan: Class: Understanding Heart Disease, Class: Common Heart Medications, avoid places with second hand smoke, Avoid Processed foods, engage in regular exercise, eliminate salt shaker at the table and use salt substitutes     Readiness to change: Maintenance: (Maintaining the behavior change)

## 2023-07-13 ENCOUNTER — APPOINTMENT (OUTPATIENT)
Dept: LAB | Facility: HOSPITAL | Age: 58
End: 2023-07-13
Payer: COMMERCIAL

## 2023-07-13 ENCOUNTER — OFFICE VISIT (OUTPATIENT)
Dept: CARDIOLOGY CLINIC | Facility: CLINIC | Age: 58
End: 2023-07-13
Payer: COMMERCIAL

## 2023-07-13 VITALS
HEIGHT: 69 IN | WEIGHT: 190 LBS | SYSTOLIC BLOOD PRESSURE: 100 MMHG | BODY MASS INDEX: 28.14 KG/M2 | DIASTOLIC BLOOD PRESSURE: 70 MMHG | HEART RATE: 96 BPM

## 2023-07-13 DIAGNOSIS — Z95.5 STATUS POST INSERTION OF DRUG ELUTING CORONARY ARTERY STENT: ICD-10-CM

## 2023-07-13 DIAGNOSIS — I25.10 CORONARY ARTERY DISEASE INVOLVING NATIVE CORONARY ARTERY OF NATIVE HEART WITHOUT ANGINA PECTORIS: Primary | ICD-10-CM

## 2023-07-13 DIAGNOSIS — Z95.5 STATUS POST PRIMARY ANGIOPLASTY WITH CORONARY STENT: ICD-10-CM

## 2023-07-13 DIAGNOSIS — E66.3 OVERWEIGHT (BMI 25.0-29.9): ICD-10-CM

## 2023-07-13 DIAGNOSIS — E78.00 HYPERCHOLESTEREMIA: ICD-10-CM

## 2023-07-13 LAB
25(OH)D3 SERPL-MCNC: 34.3 NG/ML (ref 30–100)
ALBUMIN SERPL BCP-MCNC: 4.3 G/DL (ref 3.5–5)
ALP SERPL-CCNC: 76 U/L (ref 34–104)
ALT SERPL W P-5'-P-CCNC: 43 U/L (ref 7–52)
ANION GAP SERPL CALCULATED.3IONS-SCNC: 9 MMOL/L
AST SERPL W P-5'-P-CCNC: 30 U/L (ref 13–39)
BILIRUB SERPL-MCNC: 1.18 MG/DL (ref 0.2–1)
BUN SERPL-MCNC: 24 MG/DL (ref 5–25)
CALCIUM SERPL-MCNC: 9.1 MG/DL (ref 8.4–10.2)
CHLORIDE SERPL-SCNC: 102 MMOL/L (ref 96–108)
CHOLEST SERPL-MCNC: 119 MG/DL
CO2 SERPL-SCNC: 27 MMOL/L (ref 21–32)
CREAT SERPL-MCNC: 0.92 MG/DL (ref 0.6–1.3)
GFR SERPL CREATININE-BSD FRML MDRD: 90 ML/MIN/1.73SQ M
GLUCOSE P FAST SERPL-MCNC: 89 MG/DL (ref 65–99)
HDLC SERPL-MCNC: 28 MG/DL
LDLC SERPL CALC-MCNC: 37 MG/DL (ref 0–100)
NONHDLC SERPL-MCNC: 91 MG/DL
POTASSIUM SERPL-SCNC: 3.7 MMOL/L (ref 3.5–5.3)
PROT SERPL-MCNC: 7 G/DL (ref 6.4–8.4)
SODIUM SERPL-SCNC: 138 MMOL/L (ref 135–147)
TRIGL SERPL-MCNC: 268 MG/DL

## 2023-07-13 PROCEDURE — 80053 COMPREHEN METABOLIC PANEL: CPT

## 2023-07-13 PROCEDURE — 82306 VITAMIN D 25 HYDROXY: CPT

## 2023-07-13 PROCEDURE — 99214 OFFICE O/P EST MOD 30 MIN: CPT | Performed by: INTERNAL MEDICINE

## 2023-07-13 PROCEDURE — 80061 LIPID PANEL: CPT

## 2023-07-13 NOTE — PROGRESS NOTES
Cardiology Follow Up     Erinn Mendoza  [id number]  1965  [facility][note 3] [facility] [address] [address]      1. Coronary artery disease involving native coronary artery of native heart without angina pectoris        2. Hypercholesteremia  Lipid Panel with Direct LDL reflex      3. Status post insertion of drug eluting coronary artery stent        4. Overweight (BMI 25.0-29. 9)            Discussion/Summary:  1. ST segment elevation MI 3/21/2023 with ischemic cardiomyopathy  2. Coronary artery disease with PCI to RCA 3/21/2023 and PCI to LAD 3/27/2023  3. Hyperlipidemia  4.   Overweight    -Transthoracic echocardiogram 3/22/2023 showing left ventricular systolic function normal estimated LVEF 60% with grade 1 diastolic dysfunction, hypokinesis of the basal inferior wall with sigmoid appearance of septum with normal right ventricular systolic function and mild to moderate tricuspid regurgitation.  -As patient notes overall doing well at this time we will continue aspirin 81 mg daily, Brilinta 90 mg twice daily, metoprolol tartrate 25 mg twice daily and atorvastatin 80 mg daily.  -Lipid panel 7/13/2023 showing total cholesterol 119, triglyceride 268, HDL 28, LDL 37  -After discussion with patient about elevated triglyceride levels he wishes to trial more aggressive dietary lifestyle modifications prior to initiation of medical therapy.  -Patient counseled on dietary and lifestyle modifications including following a low-salt, low-fat, heart healthy diet with sodium restriction to less than 1800 g of sodium daily and continued physical activity with increase in exercise regimen as he tolerates now since he has completed cardiac rehab and monitoring for any symptoms.  -Patient notes blood pressures at home well controlled we will continue to monitor  -Patient will be seen in 6 months or sooner if necessary and I will recheck fasting lipid panel prior to that office visit to monitor triglyceride levels  -Patient counseled if he were to have any warning or alarm type symptoms he is to seek emergency medical care immediately. History of Present Illness:  -Patient is a 18-year-old male with inferior ST segment elevation on 3/21/2023 who initially presented to [address] [place] [address] “” [place] and was transferred [facility] found to have multivessel coronary artery disease with PCI to culprit lesion in mid RCA via right femoral access closed with Perclose as he had severe radial loop that prohibited use of right radial artery access and underwent staged procedure 3/27/2023 with PCI to LAD with successful IVUS guided PCI to proximal LAD again via femoral access closed with Perclose initiated on appropriate medical therapy with dual antiplatelet regimen, metoprolol and Lipitor and presented to the office for hospital follow-up in April 2023. He was able to complete cardiac rehab with no significant issues.  -Currently in the office today he denies any chest pain, palpitations, lightness or dizziness, loss conscious, shortness of breath, lower extreme edema, orthopnea or bendopnea. He notes his blood pressures at home are very well controlled and he is very compliant with his dietary and lifestyle modifications.     Patient Active Problem List   Diagnosis   • Hypercholesteremia   • Pneumonia   • BMI 27.0-27.9,adult   • Non-traumatic rhabdomyolysis   • Elevated BP without diagnosis of hypertension   • Other fatigue   • STEMI (ST elevation myocardial infarction) ([address])   • Status post insertion of drug eluting coronary artery stent   • Coronary artery disease involving native coronary artery     Past Medical History:   Diagnosis Date   • Coronary artery disease    • ST elevation MI (STEMI) 03/21/2023     Social History     Socioeconomic History   • Marital status: /Civil [employer]     Spouse name: Not on file   • Number of children: 2   • Years of education: Not on file   • Highest education level: Not on file   Occupational History   • Occupation:     Tobacco Use   • Smoking status: Never   • Smokeless tobacco: Former   Vaping Use   • Vaping Use: Never used   Substance and Sexual Activity   • Alcohol use: Not Currently     Comment: once per month   • Drug use: No   • Sexual activity: Not on file   Other Topics Concern   • Not on file   Social History Narrative   • Not on file     Social Determinants of Health     Financial Resource Strain: Not on file   Food Insecurity: No Food Insecurity (3/22/2023)    Hunger Vital Sign    • Worried About Running Out of Food in the Last Year: Never true    • Ran Out of Food in the Last Year: Never true   Transportation Needs: No Transportation Needs (3/22/2023)    PRAPARE - Transportation    • Lack of Transportation (Medical): No    • Lack of Transportation (Non-Medical): No   Physical Activity: Not on file   Stress: Not on file   Social Connections: Not on file   Intimate Partner Violence: Not on file   Housing Stability: Low Risk  (3/22/2023)    Housing Stability Vital Sign    • Unable to Pay for Housing in the Last Year: No    • Number of Places Lived in the Last Year: 1    • Unstable Housing in the Last Year: No      Family History   Problem Relation Age of Onset   • No Known Problems Mother    • Coronary artery disease Father    • Kidney failure Father    • Heart attack Father    • Diabetes type II Father    • Heart attack Brother 39        had stents     Past Surgical History:   Procedure Laterality Date   • CARDIAC CATHETERIZATION N/A 3/21/2023    Procedure: Cardiac pci;  Surgeon: Ko Kenny MD;  [facility]; Service: Cardiology   • CARDIAC CATHETERIZATION N/A 3/21/2023    Procedure: Cardiac Coronary Angiogram;  Surgeon: Ko Kenny MD;  [facility];   Service: Cardiology • CARDIAC CATHETERIZATION N/A 3/27/2023    Procedure: Cardiac pci;  Surgeon: Jhonny Hanson MD;  [facility]; Service: Cardiology   • FINGER SURGERY Right     Amputation of index finger with neurectomy (each), distal amputation        Current Outpatient Medications:   •  aspirin 81 mg chewable tablet, Chew 1 tablet (81 mg total) daily Do not start before March 24, 2023., Disp: , Rfl: 0  •  atorvastatin (LIPITOR) 80 mg tablet, Take 1 tablet (80 mg total) by mouth every evening, Disp: 30 tablet, Rfl: 5  •  metoprolol tartrate (LOPRESSOR) 25 mg tablet, Take 1 tablet (25 mg total) by mouth every 12 (twelve) hours, Disp: 60 tablet, Rfl: 5  •  ticagrelor (Brilinta) 90 MG, Take 1 tablet (90 mg total) by mouth every 12 (twelve) hours, Disp: 60 tablet, Rfl: 11  •  albuterol (ProAir HFA) 90 mcg/act inhaler, Inhale 2 puffs every 6 (six) hours as needed for wheezing (Patient not taking: Reported on 4/3/2023), Disp: 8.5 g, Rfl: 0  •  nitroglycerin (NITROSTAT) 0.4 mg SL tablet, Place 1 tablet (0.4 mg total) under the tongue every 5 (five) minutes as needed for chest pain (Patient not taking: Reported on 4/3/2023), Disp: 30 tablet, Rfl: 2  No Known Allergies      Labs:  No visits with results within 2 Month(s) from this visit.    Latest known visit with results is:   Admission on 03/27/2023, Discharged on 03/28/2023   Component Date Value   • SARS-CoV-2 03/27/2023 Negative    • INFLUENZA A PCR 03/27/2023 Negative    • INFLUENZA B PCR 03/27/2023 Negative    • RSV PCR 03/27/2023 Negative    • Sodium 03/27/2023 136    • Potassium 03/27/2023 3.8    • Chloride 03/27/2023 106    • CO2 03/27/2023 27    • ANION GAP 03/27/2023 3 (L)    • BUN 03/27/2023 18    • Creatinine 03/27/2023 1.11    • Glucose 03/27/2023 103    • Glucose, Fasting 03/27/2023 103 (H)    • Calcium 03/27/2023 9.2    • eGFR 03/27/2023 72    • Ventricular Rate 03/27/2023 64    • Atrial Rate 03/27/2023 64    • OR Interval 03/27/2023 178    • QRSD Interval 03/27/2023 92    • QT Interval 03/27/2023 432    • QTC Interval 03/27/2023 445    • P Axis 03/27/2023 51    • QRS Axis 03/27/2023 29    • T Wave Axis 03/27/2023 16    • Activated Clotting Time,* 03/27/2023 412 (H)    • Specimen Type 03/27/2023 VENOUS    • Ventricular Rate 03/27/2023 60    • Atrial Rate 03/27/2023 60    • DC Interval 03/27/2023 178    • QRSD Interval 03/27/2023 92    • QT Interval 03/27/2023 426    • QTC Interval 03/27/2023 426    • P Axis 03/27/2023 45    • QRS Axis 03/27/2023 14    • T Wave Axis 03/27/2023 6    • Sodium 03/28/2023 137    • Potassium 03/28/2023 3.7    • Chloride 03/28/2023 109 (H)    • CO2 03/28/2023 24    • ANION GAP 03/28/2023 4    • BUN 03/28/2023 16    • Creatinine 03/28/2023 1.08    • Glucose 03/28/2023 161 (H)    • Calcium 03/28/2023 8.7    • eGFR 03/28/2023 75         Imaging: No results found. Review of Systems:  Review of Systems   Constitutional: Negative for chills, diaphoresis, fatigue and fever. HENT: Negative for trouble swallowing and voice change. Eyes: Negative for pain and redness. Respiratory: Negative for shortness of breath and wheezing. Cardiovascular: Negative for chest pain, palpitations and leg swelling. Gastrointestinal: Negative for abdominal pain, constipation, diarrhea, nausea and vomiting. Genitourinary: Negative for dysuria. Musculoskeletal: Negative for neck pain and neck stiffness. Skin: Negative for rash. Neurological: Negative for syncope, facial asymmetry, light-headedness and numbness. Psychiatric/Behavioral: Negative for agitation and confusion. All other systems reviewed and are negative. Vitals:    07/13/23 1504   BP: 100/70   Pulse: 96   Weight: 86.2 kg (190 lb)   Height: 5' 9" (1.753 m)     Vitals:    07/13/23 1504   Weight: 86.2 kg (190 lb)     Height: 5' 9" (175.3 cm)     Physical Exam:  Physical Exam  Vitals reviewed. Constitutional:       General: He is not in acute distress.      Appearance: Normal appearance. He is not diaphoretic. HENT:      Head: Normocephalic and atraumatic. Eyes:      General:         Right eye: No discharge. Left eye: No discharge. Neck:      Comments: Trachea midline, no JVD present  Cardiovascular:      Rate and Rhythm: Normal rate and regular rhythm. Heart sounds: No friction rub. Pulmonary:      Effort: Pulmonary effort is normal. No respiratory distress. Breath sounds: No wheezing. Chest:      Chest wall: No tenderness. Abdominal:      General: Bowel sounds are normal.      Palpations: Abdomen is soft. Tenderness: There is no abdominal tenderness. There is no rebound. Musculoskeletal:      Right lower leg: No edema. Left lower leg: No edema. Skin:     General: Skin is warm and dry. Neurological:      Mental Status: He is alert. Comments: Awake, alert, able to answer questions appropriately, able to move extremities bilaterally.    Psychiatric:         Mood and Affect: Mood normal.         Behavior: Behavior normal.

## 2023-09-18 ENCOUNTER — HOSPITAL ENCOUNTER (EMERGENCY)
Facility: HOSPITAL | Age: 58
Discharge: HOME | End: 2023-09-18
Attending: EMERGENCY MEDICINE | Admitting: EMERGENCY MEDICINE
Payer: COMMERCIAL

## 2023-09-18 ENCOUNTER — APPOINTMENT (EMERGENCY)
Dept: RADIOLOGY | Facility: HOSPITAL | Age: 58
End: 2023-09-18
Payer: COMMERCIAL

## 2023-09-18 VITALS
OXYGEN SATURATION: 94 % | RESPIRATION RATE: 18 BRPM | TEMPERATURE: 98.2 F | HEART RATE: 56 BPM | SYSTOLIC BLOOD PRESSURE: 117 MMHG | DIASTOLIC BLOOD PRESSURE: 67 MMHG

## 2023-09-18 DIAGNOSIS — R07.9 CHEST PAIN: Primary | ICD-10-CM

## 2023-09-18 LAB
2HR DELTA HS TROPONIN: -1 NG/L
ANION GAP SERPL CALCULATED.3IONS-SCNC: 4 MMOL/L
ATRIAL RATE: 55 BPM
BASOPHILS # BLD AUTO: 0.08 THOUSANDS/ÂΜL (ref 0–0.1)
BASOPHILS NFR BLD AUTO: 1 % (ref 0–1)
BUN SERPL-MCNC: 15 MG/DL (ref 5–25)
CALCIUM SERPL-MCNC: 9.4 MG/DL (ref 8.4–10.2)
CARDIAC TROPONIN I PNL SERPL HS: 3 NG/L
CARDIAC TROPONIN I PNL SERPL HS: 4 NG/L
CHLORIDE SERPL-SCNC: 105 MMOL/L (ref 96–108)
CO2 SERPL-SCNC: 30 MMOL/L (ref 21–32)
CREAT SERPL-MCNC: 0.97 MG/DL (ref 0.6–1.3)
EOSINOPHIL # BLD AUTO: 0.07 THOUSAND/ÂΜL (ref 0–0.61)
EOSINOPHIL NFR BLD AUTO: 1 % (ref 0–6)
ERYTHROCYTE [DISTWIDTH] IN BLOOD BY AUTOMATED COUNT: 12.9 % (ref 11.6–15.1)
GFR SERPL CREATININE-BSD FRML MDRD: 85 ML/MIN/1.73SQ M
GLUCOSE SERPL-MCNC: 114 MG/DL (ref 65–140)
HCT VFR BLD AUTO: 46.1 % (ref 36.5–49.3)
HGB BLD-MCNC: 15.8 G/DL (ref 12–17)
IMM GRANULOCYTES # BLD AUTO: 0.05 THOUSAND/UL (ref 0–0.2)
IMM GRANULOCYTES NFR BLD AUTO: 1 % (ref 0–2)
LYMPHOCYTES # BLD AUTO: 1.04 THOUSANDS/ÂΜL (ref 0.6–4.47)
LYMPHOCYTES NFR BLD AUTO: 14 % (ref 14–44)
MCH RBC QN AUTO: 28.9 PG (ref 26.8–34.3)
MCHC RBC AUTO-ENTMCNC: 34.3 G/DL (ref 31.4–37.4)
MCV RBC AUTO: 84 FL (ref 82–98)
MONOCYTES # BLD AUTO: 0.53 THOUSAND/ÂΜL (ref 0.17–1.22)
MONOCYTES NFR BLD AUTO: 7 % (ref 4–12)
NEUTROPHILS # BLD AUTO: 5.9 THOUSANDS/ÂΜL (ref 1.85–7.62)
NEUTS SEG NFR BLD AUTO: 76 % (ref 43–75)
NRBC BLD AUTO-RTO: 0 /100 WBCS
P AXIS: 47 DEGREES
PLATELET # BLD AUTO: 199 THOUSANDS/UL (ref 149–390)
PMV BLD AUTO: 10.4 FL (ref 8.9–12.7)
POTASSIUM SERPL-SCNC: 3.9 MMOL/L (ref 3.5–5.3)
PR INTERVAL: 180 MS
QRS AXIS: 23 DEGREES
QRSD INTERVAL: 88 MS
QT INTERVAL: 428 MS
QTC INTERVAL: 409 MS
RBC # BLD AUTO: 5.47 MILLION/UL (ref 3.88–5.62)
SODIUM SERPL-SCNC: 139 MMOL/L (ref 135–147)
T WAVE AXIS: 46 DEGREES
VENTRICULAR RATE: 55 BPM
WBC # BLD AUTO: 7.67 THOUSAND/UL (ref 4.31–10.16)

## 2023-09-18 PROCEDURE — 36415 COLL VENOUS BLD VENIPUNCTURE: CPT

## 2023-09-18 PROCEDURE — 80048 BASIC METABOLIC PNL TOTAL CA: CPT

## 2023-09-18 PROCEDURE — 99285 EMERGENCY DEPT VISIT HI MDM: CPT

## 2023-09-18 PROCEDURE — 93005 ELECTROCARDIOGRAM TRACING: CPT

## 2023-09-18 PROCEDURE — 71045 X-RAY EXAM CHEST 1 VIEW: CPT

## 2023-09-18 PROCEDURE — 99285 EMERGENCY DEPT VISIT HI MDM: CPT | Performed by: EMERGENCY MEDICINE

## 2023-09-18 PROCEDURE — 85025 COMPLETE CBC W/AUTO DIFF WBC: CPT

## 2023-09-18 PROCEDURE — 84484 ASSAY OF TROPONIN QUANT: CPT

## 2023-09-18 PROCEDURE — 93010 ELECTROCARDIOGRAM REPORT: CPT | Performed by: INTERNAL MEDICINE

## 2023-09-18 RX ORDER — SODIUM CHLORIDE 9 MG/ML
3 INJECTION INTRAVENOUS
Status: DISCONTINUED | OUTPATIENT
Start: 2023-09-18 | End: 2023-09-18 | Stop reason: HOSPADM

## 2023-09-18 NOTE — ED PROVIDER NOTES
History  Chief Complaint   Patient presents with   • Chest Pain     Pt reports "feeling uneasy and gas starting last night." Developed CP this morning at work, mid-sternal, non-radiating, now resolved. Pt denies SOB, (-) N/V/D. Hx of heart attack with stent placement x2 in March 2023. HPI  Patient is 69-year-old male presenting for concerns of chest pain that began last night. Past medical history significant for recent MI and stent placement in March 2023, patient on Brilinta and aspirin. Patient states he began experiencing discomfort earlier last night but abated and then he woke up this morning and had worsening substernal chest pain that was pressure-like in nature. Patient denies any diaphoresis, shortness of breath, lightheadedness, dizziness, palpitations, radiation of pain, nausea/vomit/diarrhea, abdominal pain. Patient states pain has since resolved without any nitroglycerin. Patient took his home medications this morning. Prior to Admission Medications   Prescriptions Last Dose Informant Patient Reported? Taking? albuterol (ProAir HFA) 90 mcg/act inhaler  Self No No   Sig: Inhale 2 puffs every 6 (six) hours as needed for wheezing   Patient not taking: Reported on 4/3/2023   aspirin 81 mg chewable tablet  Self No No   Sig: Chew 1 tablet (81 mg total) daily Do not start before March 24, 2023.    atorvastatin (LIPITOR) 80 mg tablet   No No   Sig: Take 1 tablet (80 mg total) by mouth every evening   metoprolol tartrate (LOPRESSOR) 25 mg tablet   No No   Sig: Take 1 tablet (25 mg total) by mouth every 12 (twelve) hours   nitroglycerin (NITROSTAT) 0.4 mg SL tablet  Self No No   Sig: Place 1 tablet (0.4 mg total) under the tongue every 5 (five) minutes as needed for chest pain   Patient not taking: Reported on 4/3/2023   ticagrelor (Brilinta) 90 MG  Self No No   Sig: Take 1 tablet (90 mg total) by mouth every 12 (twelve) hours      Facility-Administered Medications: None       Past Medical History: Diagnosis Date   • Coronary artery disease    • ST elevation MI (STEMI) 03/21/2023       Past Surgical History:   Procedure Laterality Date   • CARDIAC CATHETERIZATION N/A 3/21/2023    Procedure: Cardiac pci;  Surgeon: Beulah Willoughby MD;  [facility]; Service: Cardiology   • CARDIAC CATHETERIZATION N/A 3/21/2023    Procedure: Cardiac Coronary Angiogram;  Surgeon: Beulah Willoughby MD;  [facility]; Service: Cardiology   • CARDIAC CATHETERIZATION N/A 3/27/2023    Procedure: Cardiac pci;  Surgeon: Beulah Willoughby MD;  [facility]; Service: Cardiology   • FINGER SURGERY Right     Amputation of index finger with neurectomy (each), distal amputation        Family History   Problem Relation Age of Onset   • No Known Problems Mother    • Coronary artery disease Father    • Kidney failure Father    • Heart attack Father    • Diabetes type II Father    • Heart attack Brother 39        had stents     I have reviewed and agree with the history as documented. E-Cigarette/Vaping   • E-Cigarette Use Never User      E-Cigarette/Vaping Substances     Social History     Tobacco Use   • Smoking status: Never   • Smokeless tobacco: Former   Vaping Use   • Vaping Use: Never used   Substance Use Topics   • Alcohol use: Not Currently     Comment: once per month   • Drug use: No        Review of Systems   Constitutional: Negative. HENT: Negative. Eyes: Negative. Respiratory: Negative. Cardiovascular: Positive for chest pain. Gastrointestinal: Negative. Endocrine: Negative. Genitourinary: Negative. Musculoskeletal: Negative. Skin: Negative. Allergic/Immunologic: Negative. Neurological: Negative. Hematological: Negative. Psychiatric/Behavioral: Negative.         Physical Exam  ED Triage Vitals [09/18/23 0907]   Temperature Pulse Respirations Blood Pressure SpO2   98.2 °F (36.8 °C) 58 18 148/93 98 %      Temp Source Heart Rate Source Patient Position - Orthostatic VS BP Location FiO2 (%)   Oral Monitor Lying Left arm --      Pain Score       No Pain             Orthostatic Vital Signs  Vitals:    09/18/23 0907 09/18/23 1100 09/18/23 1300   BP: 148/93 115/74 117/67   Pulse: 58 (!) 54 56   Patient Position - Orthostatic VS: Lying         Physical Exam  Vitals and nursing note reviewed. Constitutional:       Appearance: He is well-developed and normal weight. HENT:      Head: Normocephalic and atraumatic. Eyes:      Extraocular Movements: Extraocular movements intact. Pupils: Pupils are equal, round, and reactive to light. Cardiovascular:      Rate and Rhythm: Normal rate and regular rhythm. Pulses:           Radial pulses are 2+ on the right side and 2+ on the left side. Heart sounds: Normal heart sounds. Pulmonary:      Effort: Pulmonary effort is normal.      Breath sounds: Normal breath sounds. Abdominal:      General: Bowel sounds are normal.      Palpations: Abdomen is soft. Musculoskeletal:         General: Normal range of motion. Cervical back: Normal range of motion and neck supple. Skin:     General: Skin is warm and dry. Capillary Refill: Capillary refill takes less than 2 seconds. Neurological:      General: No focal deficit present. Mental Status: He is alert and oriented to person, place, and time.    Psychiatric:         Mood and Affect: Mood normal.         Behavior: Behavior normal.         ED Medications  Medications - No data to display    Diagnostic Studies  Results Reviewed     Procedure Component Value Units Date/Time    HS Troponin I 2hr [[id number]]  (Normal) Collected: 09/18/23 1105    Lab Status: Final result Specimen: Blood from Arm, Right Updated: 09/18/23 1222     hs TnI 2hr 3 ng/L      Delta 2hr hsTnI -1 ng/L     HS Troponin 0hr (reflex protocol) [[id number]]  (Normal) Collected: 09/18/23 0911    Lab Status: Final result Specimen: Blood from Arm, Right Updated: 09/18/23 0950     hs TnI 0hr 4 ng/L     Basic metabolic panel [[id number]] Collected: 09/18/23 0911    Lab Status: Final result Specimen: Blood from Arm, Right Updated: 09/18/23 0943     Sodium 139 mmol/L      Potassium 3.9 mmol/L      Chloride 105 mmol/L      CO2 30 mmol/L      ANION GAP 4 mmol/L      BUN 15 mg/dL      Creatinine 0.97 mg/dL      Glucose 114 mg/dL      Calcium 9.4 mg/dL      eGFR 85 ml/min/1.73sq m     Narrative:      [place] guidelines for Chronic Kidney Disease (CKD):   •  Stage 1 with normal or high GFR (GFR > 90 mL/min/1.73 square meters)  •  Stage 2 Mild CKD (GFR = 60-89 mL/min/1.73 square meters)  •  Stage 3A Moderate CKD (GFR = 45-59 mL/min/1.73 square meters)  •  Stage 3B Moderate CKD (GFR = 30-44 mL/min/1.73 square meters)  •  Stage 4 Severe CKD (GFR = 15-29 mL/min/1.73 square meters)  •  Stage 5 End Stage CKD (GFR <15 mL/min/1.73 square meters)  Note: GFR calculation is accurate only with a steady state creatinine    CBC and differential [[id number]]  (Abnormal) Collected: 09/18/23 0911    Lab Status: Final result Specimen: Blood from Arm, Right Updated: 09/18/23 0928     WBC 7.67 Thousand/uL      RBC 5.47 Million/uL      Hemoglobin 15.8 g/dL      Hematocrit 46.1 %      MCV 84 fL      MCH 28.9 pg      MCHC 34.3 g/dL      RDW 12.9 %      MPV 10.4 fL      Platelets 464 Thousands/uL      nRBC 0 /100 WBCs      Neutrophils Relative 76 %      Immat GRANS % 1 %      Lymphocytes Relative 14 %      Monocytes Relative 7 %      Eosinophils Relative 1 %      Basophils Relative 1 %      Neutrophils Absolute 5.90 Thousands/µL      Immature Grans Absolute 0.05 Thousand/uL      Lymphocytes Absolute 1.04 Thousands/µL      Monocytes Absolute 0.53 Thousand/µL      Eosinophils Absolute 0.07 Thousand/µL      Basophils Absolute 0.08 Thousands/µL                  X-ray chest 1 view portable   Final Result by Sangeetha Plasencia MD (09/18 1006)      No acute cardiopulmonary disease.                Workstation performed: [id number]               Procedures  ECG 12 Lead Documentation Only    Date/Time: 9/18/2023 9:10 AM    Performed by: Florian Herrera MD  Authorized by: Florian Herrera MD    Indications / Diagnosis:  Chest pain  ECG reviewed by me, the ED Provider: yes    Patient location:  ED  Interpretation:     Interpretation: normal    Rate:     ECG rate assessment: normal    Rhythm:     Rhythm: sinus bradycardia    Ectopy:     Ectopy: none    QRS:     QRS axis:  Normal    QRS intervals:  Normal  Conduction:     Conduction: normal    ST segments:     ST segments:  Normal  T waves:     T waves: normal            ED Course  ED Course as of 09/18/23 1612   Mon Sep 18, 2023   0950 hs TnI 0hr: 4             HEART Risk Score    Flowsheet Row Most Recent Value   Heart Score Risk Calculator    History 1 Filed at: 09/18/2023 1604   ECG 0 Filed at: 09/18/2023 1604   Age 1 Filed at: 09/18/2023 1604   Risk Factors 2 Filed at: 09/18/2023 1604   Troponin 0 Filed at: 09/18/2023 1604   HEART Score 4 Filed at: 09/18/2023 1604                      SBIRT 20yo+    Flowsheet Row Most Recent Value   Initial Alcohol Screen: US AUDIT-C     1. How often do you have a drink containing alcohol? 0 Filed at: 09/18/2023 0909   2. How many drinks containing alcohol do you have on a typical day you are drinking? 0 Filed at: 09/18/2023 0909   3a. Male UNDER 65: How often do you have five or more drinks on one occasion? 0 Filed at: 09/18/2023 0909   Audit-C Score 0 Filed at: 09/18/2023 1801   QING: How many times in the past year have you. .. Used an illegal drug or used a prescription medication for non-medical reasons? Never Filed at: 09/18/2023 1758                Medical Decision Making  Patient is 39-year-old male presenting for concerns of chest pain.   DDx: ACS, arrhythmia, pneumothorax, electrolyte abnormality, anemia, anxiety, dissection  Based on patient presentation physical exam findings, will obtain full cardiac work-up for ACS rule out given patient past medical history and recent stent placement in March 2023. If lab work-up negative, will plan for discharge with close follow-up with cardiology. If troponins elevated or concerns on EKG we will plan for admission for observation cardiology work-up. Lab work-up EKG and chest x-ray are all negative for any acute findings. Plan for close follow-up with cardiology. Ambulatory referral given. Strict return precautions given. Patient understands agrees. Ready for discharge. Chest pain: acute illness or injury  Amount and/or Complexity of Data Reviewed  Labs: ordered. Decision-making details documented in ED Course. Radiology: ordered. Disposition  Final diagnoses:   Chest pain     Time reflects when diagnosis was documented in both MDM as applicable and the Disposition within this note     Time User Action Codes Description Comment    9/18/2023  1:06 PM Sameer Zamorano Add [R07.9] Chest pain       ED Disposition     ED Disposition   Discharge    Condition   Stable    Date/Time   Mon Sep 18, 2023  1:06 PM    Comment   Anum Anton discharge to home/self care. Follow-up Information     Follow up With Specialties Details Why Contact Info Additional [address] [facility] Emergency Medicine Go to  If symptoms worsen [address] [address]  [facility], [address], [place], [facility], [address] [place] [address], Nurse Practitioner Go to  If symptoms worsen [address]   [address] [facility] [address] [phone]       [address] [place] [address] Cardiology Schedule an appointment as soon as possible for a visit   [address]  [address] [facility] Cardiology [address] [facility] [place], [address] [place] [address], [address] [place] [address] Medication List as of 9/18/2023  1:22 PM      CONTINUE these medications which have NOT CHANGED    Details   albuterol (ProAir HFA) 90 mcg/act inhaler Inhale 2 puffs every 6 (six) hours as needed for wheezing, Starting Wed 10/12/2022, Normal      aspirin 81 mg chewable tablet Chew 1 tablet (81 mg total) daily Do not start before March 24, 2023., Starting Fri 3/24/2023, No Print      atorvastatin (LIPITOR) 80 mg tablet Take 1 tablet (80 mg total) by mouth every evening, Starting Fri 6/16/2023, Normal      metoprolol tartrate (LOPRESSOR) 25 mg tablet Take 1 tablet (25 mg total) by mouth every 12 (twelve) hours, Starting Fri 6/16/2023, Normal      nitroglycerin (NITROSTAT) 0.4 mg SL tablet Place 1 tablet (0.4 mg total) under the tongue every 5 (five) minutes as needed for chest pain, Starting u 3/23/2023, Normal      ticagrelor (Brilinta) 90 MG Take 1 tablet (90 mg total) by mouth every 12 (twelve) hours, Starting Wed 3/22/2023, Normal               PDMP Review     None           ED Provider  Attending physically available and evaluated Sea Mims. I managed the patient along with the ED Attending.     Electronically Signed by         Rodrick Zacarias MD  09/18/23 1173

## 2023-09-18 NOTE — ED ATTENDING ATTESTATION
Lucien Jamison MD, saw and evaluated the patient. I have discussed the patient with the resident and agree with the resident's findings, Plan of Care, and MDM as documented in the resident's note, except where noted. All available labs and Radiology studies were reviewed. I was present for key portions of any procedure(s) performed by the resident and I was immediately available to provide assistance. At this point I agree with the current assessment done in the Emergency Department. I have conducted an independent evaluation of this patient a history and physical is as follows:    61 yo male with a history of CAD s/p STEMI in March with stents x 2 presents to the ED complaining of a transient episode of chest pain. The patient says he developed an "uneasy feeling" in his central chest last night while at rest. He attributed the discomfort to gas and went to bed. After waking up this morning the pain returned but as a more intense "pressure" so he decided to come to the ED for evaluation. Pain resolved spontaneously prior to arrival in the ED. No associated shortness of breath, nausea, vomiting, or diaphoresis. The patient has been compliant with all of his home medications. No other specific complaints. ROS: per resident physician note    Gen: NAD, AA&Ox3  HEENT: PERRL, EOMI  Neck: supple  CV: RRR  Lungs: CTA B/L  Abdomen: soft, NT/ND  Ext: no swelling or deformity  Neuro: 5/5 strength all extremities, sensation grossly intact  Skin: no rash    ED Course  The patient is very comfortable appearing with stable vital signs and a benign exam. He is also entirely asymptomatic at present. Initial EKG unremarkable. ACS vs GERD vs anxiety vs musculoskeletal pain? Will check basic labs, CXR, and troponin x 2. Will continue to monitor in the ED. Disposition per workup and reassessment.       Critical Care Time  Procedures

## 2023-09-25 ENCOUNTER — OFFICE VISIT (OUTPATIENT)
Dept: FAMILY MEDICINE CLINIC | Facility: CLINIC | Age: 58
End: 2023-09-25
Payer: COMMERCIAL

## 2023-09-25 VITALS
HEART RATE: 83 BPM | TEMPERATURE: 98.4 F | BODY MASS INDEX: 28.44 KG/M2 | SYSTOLIC BLOOD PRESSURE: 126 MMHG | OXYGEN SATURATION: 95 % | WEIGHT: 192 LBS | DIASTOLIC BLOOD PRESSURE: 72 MMHG | HEIGHT: 69 IN

## 2023-09-25 DIAGNOSIS — Z23 ENCOUNTER FOR IMMUNIZATION: ICD-10-CM

## 2023-09-25 DIAGNOSIS — Z13.29 SCREENING FOR THYROID DISORDER: ICD-10-CM

## 2023-09-25 DIAGNOSIS — I21.3 ST ELEVATION MYOCARDIAL INFARCTION (STEMI), UNSPECIFIED ARTERY (HCC): ICD-10-CM

## 2023-09-25 DIAGNOSIS — Z12.5 SCREENING FOR MALIGNANT NEOPLASM OF PROSTATE: ICD-10-CM

## 2023-09-25 DIAGNOSIS — Z11.4 SCREENING FOR HIV (HUMAN IMMUNODEFICIENCY VIRUS): ICD-10-CM

## 2023-09-25 DIAGNOSIS — Z12.11 SCREENING FOR COLON CANCER: ICD-10-CM

## 2023-09-25 DIAGNOSIS — Z11.59 NEED FOR HEPATITIS C SCREENING TEST: Primary | ICD-10-CM

## 2023-09-25 PROCEDURE — 99396 PREV VISIT EST AGE 40-64: CPT | Performed by: NURSE PRACTITIONER

## 2023-09-25 RX ORDER — CHLORAL HYDRATE 500 MG
1000 CAPSULE ORAL 2 TIMES DAILY
COMMUNITY

## 2023-09-25 NOTE — PROGRESS NOTES
ADULT ANNUAL [place]    NAME: Kristin Barragan  AGE: 60 y.o. SEX: male  : 1965     DATE: 2023     Assessment and Plan:     Problem List Items Addressed This Visit        Cardiovascular and Mediastinum    STEMI (ST elevation myocardial infarction) ([address])    Relevant Orders    CBC and differential    Comprehensive metabolic panel   Other Visit Diagnoses     Need for hepatitis C screening test    -  Primary    Relevant Orders    Hepatitis C Antibody    Screening for HIV (human immunodeficiency virus)        Relevant Orders    HIV 1/2 AG/AB w Reflex [facility] for 2 yr old and above    Encounter for immunization        Screening for malignant neoplasm of prostate        Relevant Orders    PSA, Total Screen    Screening for thyroid disorder        Relevant Orders    TSH, 3rd generation with Free T4 reflex    Screening for colon cancer        Relevant Orders    Cologuard          Immunizations and preventive care screenings were discussed with patient today. Appropriate education was printed on patient's after visit summary. Discussed risks and benefits of prostate cancer screening. We discussed the controversial history of PSA screening for prostate cancer in the [facility] as well as the risk of over detection and over treatment of prostate cancer by way of PSA screening. The patient understands that PSA blood testing is an imperfect way to screen for prostate cancer and that elevated PSA levels in the blood may also be caused by infection, inflammation, prostatic trauma or manipulation, urological procedures, or by benign prostatic enlargement. The role of the digital rectal examination in prostate cancer screening was also discussed and I discussed with him that there is large interobserver variability in the findings of digital rectal examination.     Counseling:  Dental Health: discussed importance of regular tooth brushing, flossing, and dental visits. Exercise: the importance of regular exercise/physical activity was discussed. Recommend exercise 3-5 times per week for at least 30 minutes. BMI Counseling: Body mass index is 28.35 kg/m². The BMI is above normal. Nutrition recommendations include decreasing portion sizes, encouraging healthy choices of fruits and vegetables, decreasing fast food intake, consuming healthier snacks, limiting drinks that contain sugar, moderation in carbohydrate intake, increasing intake of lean protein, reducing intake of saturated and trans fat and reducing intake of cholesterol. Exercise recommendations include moderate physical activity 150 minutes/week. Rationale for BMI follow-up plan is due to patient being overweight or obese. Patient states that since his STEMI he has made significant changes in his diet. Depression Screening and Follow-up Plan: Patient was screened for depression during today's encounter. They screened negative with a PHQ-2 score of 0. Return in about 1 year (around 9/25/2024) for Annual physical.     Chief Complaint:     Chief Complaint   Patient presents with   • Physical Exam     Annual       History of Present Illness:     Adult Annual Physical   Patient here for a comprehensive physical exam. The patient reports no problems. He is doing well over all, was in ER last week for chest pain- negative work up thinks it was indigestion from drinking sweet tea, has not occurred since. Sees cards in a few weeks for follow up. Compliant with his medication. Diet and Physical Activity  Diet/Nutrition: well balanced diet, limited junk food and consuming 3-5 servings of fruits/vegetables daily. Exercise: no formal exercise and gets a sufficient amount of steps at work .       Depression Screening  PHQ-2/9 Depression Screening    Little interest or pleasure in doing things: 0 - not at all  Feeling down, depressed, or hopeless: 0 - not at all  PHQ-2 Score: 0  PHQ-2 Interpretation: Negative depression screen       General Health  Sleep: gets 7-8 hours of sleep on average. Hearing: normal - bilateral.  Vision: no vision problems. Dental: regular dental visits.  Health  Symptoms include: none     Review of Systems:     Review of Systems   Constitutional: Negative for activity change, appetite change, diaphoresis, fatigue, fever and unexpected weight change. HENT: Negative for congestion, mouth sores, rhinorrhea, sinus pain, trouble swallowing and voice change. Eyes: Negative for photophobia and visual disturbance. Respiratory: Negative for apnea, cough, chest tightness, shortness of breath and wheezing. Cardiovascular: Negative for chest pain, palpitations and leg swelling. Gastrointestinal: Negative for abdominal distention, abdominal pain, blood in stool, constipation, diarrhea, nausea and vomiting. Endocrine: Negative for cold intolerance, heat intolerance, polydipsia, polyphagia and polyuria. Genitourinary: Negative for decreased urine volume, difficulty urinating, frequency and urgency. Musculoskeletal: Negative for arthralgias, myalgias, neck pain and neck stiffness. Skin: Negative for color change, rash and wound. Neurological: Negative for dizziness, weakness, light-headedness, numbness and headaches. Hematological: Negative for adenopathy. Does not bruise/bleed easily. Psychiatric/Behavioral: Negative for self-injury, sleep disturbance and suicidal ideas. The patient is not nervous/anxious. Past Medical History:     Past Medical History:   Diagnosis Date   • Coronary artery disease    • ST elevation MI (STEMI) 03/21/2023      Past Surgical History:     Past Surgical History:   Procedure Laterality Date   • CARDIAC CATHETERIZATION N/A 3/21/2023    Procedure: Cardiac pci;  Surgeon: Nneka Clemens MD;  Location: [facility];   Service: Cardiology   • CARDIAC CATHETERIZATION N/A 3/21/2023    Procedure: Cardiac Coronary Angiogram;  Surgeon: Mayra Serrano MD;  Location: [facility]; Service: Cardiology   • CARDIAC CATHETERIZATION N/A 3/27/2023    Procedure: Cardiac pci;  Surgeon: Mayra Serrano MD;  Location: [facility]; Service: Cardiology   • FINGER SURGERY Right     Amputation of index finger with neurectomy (each), distal amputation       Family History:     Family History   Problem Relation Age of Onset   • No Known Problems Mother    • Coronary artery disease Father    • Kidney failure Father    • Heart attack Father    • Diabetes type II Father    • Heart attack Brother 39        had stents      Social History:     Social History     Socioeconomic History   • Marital status: /Civil Union     Spouse name: None   • Number of children: 2   • Years of education: None   • Highest education level: None   Occupational History   • Occupation:     Tobacco Use   • Smoking status: Never   • Smokeless tobacco: Former   Vaping Use   • Vaping Use: Never used   Substance and Sexual Activity   • Alcohol use: Not Currently     Comment: once per month   • Drug use: No   • Sexual activity: None   Other Topics Concern   • None   Social History Narrative   • None     Social Determinants of Health     Financial Resource Strain: Not on file   Food Insecurity: No Food Insecurity (3/22/2023)    Hunger Vital Sign    • Worried About Running Out of Food in the Last Year: Never true    • Ran Out of Food in the Last Year: Never true   Transportation Needs: No Transportation Needs (3/22/2023)    PRAPARE - Transportation    • Lack of Transportation (Medical): No    • Lack of Transportation (Non-Medical):  No   Physical Activity: Not on file   Stress: Not on file   Social Connections: Not on file   Intimate Partner Violence: Not on file   Housing Stability: Low Risk  (3/22/2023)    Housing Stability Vital Sign    • Unable to Pay for Housing in the Last Year: No    • Number of Places Lived in the Last Year: 1    • Unstable Housing in the Last Year: No      Current Medications:     Current Outpatient Medications   Medication Sig Dispense Refill   • APPLE CIDER VINEGAR PO Take by mouth     • aspirin 81 mg chewable tablet Chew 1 tablet (81 mg total) daily Do not start before March 24, 2023.  0   • atorvastatin (LIPITOR) 80 mg tablet Take 1 tablet (80 mg total) by mouth every evening 30 tablet 5   • metoprolol tartrate (LOPRESSOR) 25 mg tablet Take 1 tablet (25 mg total) by mouth every 12 (twelve) hours 60 tablet 5   • Omega-3 Fatty Acids (fish oil) 1,000 mg Take 1,000 mg by mouth 2 (two) times a day     • ticagrelor (Brilinta) 90 MG Take 1 tablet (90 mg total) by mouth every 12 (twelve) hours 60 tablet 11   • nitroglycerin (NITROSTAT) 0.4 mg SL tablet Place 1 tablet (0.4 mg total) under the tongue every 5 (five) minutes as needed for chest pain (Patient not taking: Reported on 4/3/2023) 30 tablet 2     No current facility-administered medications for this visit. Allergies:     No Known Allergies   Physical Exam:     /72   Pulse 83   Temp 98.4 °F (36.9 °C)   Ht 5' 9" (1.753 m)   Wt 87.1 kg (192 lb)   SpO2 95%   BMI 28.35 kg/m²     Physical Exam  Constitutional:       General: He is not in acute distress. Appearance: Normal appearance. He is not ill-appearing or toxic-appearing. HENT:      Head: Normocephalic and atraumatic. Right Ear: Tympanic membrane, ear canal and external ear normal. There is no impacted cerumen. Left Ear: Tympanic membrane, ear canal and external ear normal. There is no impacted cerumen. Nose: Nose normal. No congestion or rhinorrhea. Mouth/Throat:      Mouth: Mucous membranes are moist.      Pharynx: Oropharynx is clear. No oropharyngeal exudate or posterior oropharyngeal erythema. Eyes:      General: No scleral icterus. Right eye: No discharge. Left eye: No discharge.       Conjunctiva/sclera: Conjunctivae normal.      Pupils: Pupils are equal, round, and reactive to light. Cardiovascular:      Rate and Rhythm: Normal rate and regular rhythm. Pulses: Normal pulses. Heart sounds: Normal heart sounds. No murmur heard. No friction rub. No gallop. Pulmonary:      Effort: Pulmonary effort is normal. No respiratory distress. Breath sounds: Normal breath sounds. No stridor. No wheezing, rhonchi or rales. Abdominal:      General: Abdomen is flat. Bowel sounds are normal. There is no distension. Palpations: Abdomen is soft. There is no mass. Tenderness: There is no abdominal tenderness. Musculoskeletal:         General: No swelling, tenderness, deformity or signs of injury. Normal range of motion. Cervical back: Normal range of motion and neck supple. No rigidity. No muscular tenderness. Lymphadenopathy:      Cervical: No cervical adenopathy. Skin:     General: Skin is warm and dry. Capillary Refill: Capillary refill takes less than 2 seconds. Coloration: Skin is not jaundiced or pale. Findings: No bruising or lesion. Neurological:      General: No focal deficit present. Mental Status: He is alert and oriented to person, place, and time. Mental status is at baseline. Sensory: No sensory deficit. Motor: No weakness. Gait: Gait normal.   Psychiatric:         Mood and Affect: Mood normal.         Behavior: Behavior normal.         Thought Content:  Thought content normal.         Judgment: Judgment normal.          Jasmine Paulino, [address] [facility] [place]

## 2023-10-05 ENCOUNTER — RA CDI HCC (OUTPATIENT)
Dept: OTHER | Facility: HOSPITAL | Age: 58
End: 2023-10-05

## 2023-10-05 NOTE — PROGRESS NOTES
[address] [facility] coding opportunities       Chart reviewed, no opportunity found: CHART REVIEWED, NO OPPORTUNITY FOUND        Patients Insurance        Commercial Insurance: Max Leos

## 2023-10-09 ENCOUNTER — OFFICE VISIT (OUTPATIENT)
Dept: CARDIOLOGY CLINIC | Facility: CLINIC | Age: 58
End: 2023-10-09
Payer: COMMERCIAL

## 2023-10-09 VITALS
WEIGHT: 192 LBS | HEIGHT: 69 IN | SYSTOLIC BLOOD PRESSURE: 118 MMHG | HEART RATE: 82 BPM | DIASTOLIC BLOOD PRESSURE: 78 MMHG | BODY MASS INDEX: 28.44 KG/M2

## 2023-10-09 DIAGNOSIS — E78.2 MIXED HYPERLIPIDEMIA: ICD-10-CM

## 2023-10-09 DIAGNOSIS — I21.11 ST ELEVATION MYOCARDIAL INFARCTION INVOLVING RIGHT CORONARY ARTERY (HCC): ICD-10-CM

## 2023-10-09 DIAGNOSIS — E66.3 OVERWEIGHT (BMI 25.0-29.9): ICD-10-CM

## 2023-10-09 DIAGNOSIS — R07.9 CHEST PAIN: ICD-10-CM

## 2023-10-09 DIAGNOSIS — I25.10 CORONARY ARTERY DISEASE INVOLVING NATIVE CORONARY ARTERY OF NATIVE HEART WITHOUT ANGINA PECTORIS: Primary | ICD-10-CM

## 2023-10-09 DIAGNOSIS — K21.9 GASTROESOPHAGEAL REFLUX DISEASE, UNSPECIFIED WHETHER ESOPHAGITIS PRESENT: ICD-10-CM

## 2023-10-09 PROCEDURE — 99214 OFFICE O/P EST MOD 30 MIN: CPT | Performed by: INTERNAL MEDICINE

## 2023-10-09 NOTE — PROGRESS NOTES
Cardiology Follow Up    Ousmane Deluna  [id number]  1965  [facility] [address] [address]      1. Coronary artery disease involving native coronary artery of native heart without angina pectoris        2. Chest pain  Ambulatory Referral to Cardiology      3. ST elevation myocardial infarction involving right coronary artery ([address])        4. Mixed hyperlipidemia        5. Overweight (BMI 25.0-29.9)        6. Gastroesophageal reflux disease, unspecified whether esophagitis present            Discussion/Summary:  1. ST segment elevation MI 3/21/2023 with ischemic cardiomyopathy  2. Multivessel coronary artery disease with PCI to RCA 3/21/2023 and PCI to LAD 3/27/2023 with nonobstructive left circumflex disease  3. Hyperlipidemia  4. Overweight  5.   GERD    -Transthoracic echocardiogram 3/22/2023 showing left-ventricular systolic function normal submitted LVEF 02% grade 1 diastolic function, hypokinesis of the basal inferior wall with sigmoid appearance of the septum with normal right ventricular systolic function and mild to moderate tricuspid regurgitation  -Lipid panel 7/13/2023 showing total cholesterol 119, triglyceride 268, HDL 28, LDL 37  -As patient overall notes doing well at this time we will continue aspirin 81 mg daily, Brilinta 90 mg twice daily, metoprolol tartrate 25 mg twice daily, atorvastatin 80 mg daily  -Patient wishes to trial dietary and lifestyle modifications for elevated triglyceride level prior to up titration of medical therapy  -Patient counseled on dietary and lifestyle modifications including following a low-salt, low-fat, heart healthy diet with sodium restriction to less than 1800 mg of sodium daily and continued physical activity with monitoring for any significant symptoms  -Patient will be seen in 6 months or sooner if necessary  -Patient counseled if he were to have any warning or alarm type symptoms he is to seek emergency medical care immediately.  -Patient should continue to monitor for GERD symptoms with dietary changes and if recurrent should also speak with his primary care physician for adjustment of medical therapy. History of Present Illness:  -Patient is a 76-year-old male with inferior ST segment elevation MI on 3/21/2023 who initially presented to [address] [place] [address] and was transferred to [facility] in [facility] at that time found to have multivessel coronary artery disease with PCI to culprit lesion in mid RCA via right femoral access closed with Perclose due to severe radial loop that prohibited right radial artery access underwent staged procedure 3/27/2023 with PCI to LAD with successful IVUS guided PCI to proximal LAD again via femoral access who presents to the office today for scheduled follow-up.  -Patient did have brief episode of chest discomfort September 2023 and was seen and evaluated emergency department for this and was told work-up was relatively unrevealing. He notes that he believes this episode was likely related to heartburn around drinking sweet tea which he has had significant issues with in the past.  He notes that since discontinuing this he has not had any recurrence of symptoms and continues to live a very active lifestyle with no recurrence of discomfort.  -He denies any chest pain, palpitations, lightness dizziness, loss conscious, shortness of breath, lower extreme edema, orthopnea or bendopnea. He notes blood pressures at home are very well controlled and has no significant complaints at this time.     Patient Active Problem List   Diagnosis   • Hypercholesteremia   • Pneumonia   • BMI 27.0-27.9,adult   • Non-traumatic rhabdomyolysis   • Elevated BP without diagnosis of hypertension   • Other fatigue   • STEMI (ST elevation myocardial infarction) ([address])   • Status post insertion of drug eluting coronary artery stent   • Coronary artery disease involving native coronary artery     Past Medical History:   Diagnosis Date   • Coronary artery disease    • ST elevation MI (STEMI) 03/21/2023     Social History     Socioeconomic History   • Marital status: /Civil Union     Spouse name: Not on file   • Number of children: 2   • Years of education: Not on file   • Highest education level: Not on file   Occupational History   • Occupation:     Tobacco Use   • Smoking status: Never   • Smokeless tobacco: Former   Vaping Use   • Vaping Use: Never used   Substance and Sexual Activity   • Alcohol use: Not Currently     Comment: once per month   • Drug use: No   • Sexual activity: Not on file   Other Topics Concern   • Not on file   Social History Narrative   • Not on file     Social Determinants of Health     Financial Resource Strain: Not on file   Food Insecurity: No Food Insecurity (3/22/2023)    Hunger Vital Sign    • Worried About Running Out of Food in the Last Year: Never true    • Ran Out of Food in the Last Year: Never true   Transportation Needs: No Transportation Needs (3/22/2023)    PRAPARE - Transportation    • Lack of Transportation (Medical): No    • Lack of Transportation (Non-Medical):  No   Physical Activity: Not on file   Stress: Not on file   Social Connections: Not on file   Intimate Partner Violence: Not on file   Housing Stability: Low Risk  (3/22/2023)    Housing Stability Vital Sign    • Unable to Pay for Housing in the Last Year: No    • Number of Places Lived in the Last Year: 1    • Unstable Housing in the Last Year: No      Family History   Problem Relation Age of Onset   • No Known Problems Mother    • Coronary artery disease Father    • Kidney failure Father    • Heart attack Father    • Diabetes type II Father    • Heart attack Brother 39        had stents     Past Surgical History:   Procedure Laterality Date   • CARDIAC CATHETERIZATION N/A 3/21/2023    Procedure: Cardiac pci;  Surgeon: Yvonne Nickerson MD;  Location: [facility]; Service: Cardiology   • CARDIAC CATHETERIZATION N/A 3/21/2023    Procedure: Cardiac Coronary Angiogram;  Surgeon: Yvonne Nickerson MD;  Location: [facility]; Service: Cardiology   • CARDIAC CATHETERIZATION N/A 3/27/2023    Procedure: Cardiac pci;  Surgeon: Yvonne Nickerson MD;  Location: [facility];   Service: Cardiology   • FINGER SURGERY Right     Amputation of index finger with neurectomy (each), distal amputation        Current Outpatient Medications:   •  APPLE CIDER VINEGAR PO, Take by mouth, Disp: , Rfl:   •  aspirin 81 mg chewable tablet, Chew 1 tablet (81 mg total) daily Do not start before March 24, 2023., Disp: , Rfl: 0  •  atorvastatin (LIPITOR) 80 mg tablet, Take 1 tablet (80 mg total) by mouth every evening, Disp: 30 tablet, Rfl: 5  •  metoprolol tartrate (LOPRESSOR) 25 mg tablet, Take 1 tablet (25 mg total) by mouth every 12 (twelve) hours, Disp: 60 tablet, Rfl: 5  •  nitroglycerin (NITROSTAT) 0.4 mg SL tablet, Place 1 tablet (0.4 mg total) under the tongue every 5 (five) minutes as needed for chest pain, Disp: 30 tablet, Rfl: 2  •  Omega-3 Fatty Acids (fish oil) 1,000 mg, Take 1,000 mg by mouth 2 (two) times a day, Disp: , Rfl:   •  ticagrelor (Brilinta) 90 MG, Take 1 tablet (90 mg total) by mouth every 12 (twelve) hours, Disp: 60 tablet, Rfl: 11  No Known Allergies      Labs:  Admission on 09/18/2023, Discharged on 09/18/2023   Component Date Value   • Ventricular Rate 09/18/2023 55    • Atrial Rate 09/18/2023 55    • MD Interval 09/18/2023 180    • QRSD Interval 09/18/2023 88    • QT Interval 09/18/2023 428    • QTC Interval 09/18/2023 409    • P Axis 09/18/2023 47    • QRS Axis 09/18/2023 23    • T Wave Axis 09/18/2023 46    • WBC 09/18/2023 7.67    • RBC 09/18/2023 5.47    • Hemoglobin 09/18/2023 15.8    • Hematocrit 09/18/2023 46.1    • MCV 09/18/2023 84    • MCH 09/18/2023 28.9    • MCHC 09/18/2023 34.3    • RDW 09/18/2023 12.9    • MPV 09/18/2023 10.4    • Platelets 56/35/8437 199    • nRBC 09/18/2023 0    • Neutrophils Relative 09/18/2023 76 (H)    • Immat GRANS % 09/18/2023 1    • Lymphocytes Relative 09/18/2023 14    • Monocytes Relative 09/18/2023 7    • Eosinophils Relative 09/18/2023 1    • Basophils Relative 09/18/2023 1    • Neutrophils Absolute 09/18/2023 5.90    • Immature Grans Absolute 09/18/2023 0.05    • Lymphocytes Absolute 09/18/2023 1.04    • Monocytes Absolute 09/18/2023 0.53    • Eosinophils Absolute 09/18/2023 0.07    • Basophils Absolute 09/18/2023 0.08    • Sodium 09/18/2023 139    • Potassium 09/18/2023 3.9    • Chloride 09/18/2023 105    • CO2 09/18/2023 30    • ANION GAP 09/18/2023 4    • BUN 09/18/2023 15    • Creatinine 09/18/2023 0.97    • Glucose 09/18/2023 114    • Calcium 09/18/2023 9.4    • eGFR 09/18/2023 85    • hs TnI 0hr 09/18/2023 4    • hs TnI 2hr 09/18/2023 3    • Delta 2hr hsTnI 09/18/2023 -1         Imaging: X-ray chest 1 view portable    Result Date: 9/18/2023  Narrative: CHEST INDICATION:   chest pain. COMPARISON: CXR 3/21/2023, abdomen CT 3/27/2012. EXAM PERFORMED/VIEWS:  XR CHEST PORTABLE. FINDINGS: Cardiomediastinal silhouette normal. Right coronary stent. Lungs clear. No effusion or pneumothorax. Upper abdomen normal. Bones normal for age. Impression: No acute cardiopulmonary disease. Workstation performed: [id number]     Review of Systems:  Review of Systems   Constitutional: Negative for chills, diaphoresis, fatigue and fever. HENT: Negative for trouble swallowing and voice change. Eyes: Negative for pain and redness. Respiratory: Negative for shortness of breath and wheezing. Cardiovascular: Negative for chest pain, palpitations and leg swelling. Gastrointestinal: Negative for abdominal pain, constipation, diarrhea, nausea and vomiting. Genitourinary: Negative for dysuria.    Musculoskeletal: Positive for arthralgias. Negative for neck pain and neck stiffness. Skin: Negative for rash. Neurological: Negative for dizziness, syncope, light-headedness and headaches. Psychiatric/Behavioral: Negative for agitation and confusion. All other systems reviewed and are negative. Vitals:    10/09/23 1527   BP: 118/78   Pulse: 82   Weight: 87.1 kg (192 lb)   Height: 5' 9" (1.753 m)     Vitals:    10/09/23 1527   Weight: 87.1 kg (192 lb)     Height: 5' 9" (175.3 cm)     Physical Exam:  Physical Exam  Vitals reviewed. Constitutional:       General: He is not in acute distress. Appearance: Normal appearance. He is not diaphoretic. HENT:      Head: Normocephalic and atraumatic. Eyes:      General:         Right eye: No discharge. Left eye: No discharge. Neck:      Comments: Trachea midline, no JVD present  Cardiovascular:      Rate and Rhythm: Normal rate and regular rhythm. Heart sounds: No friction rub. Pulmonary:      Effort: Pulmonary effort is normal. No respiratory distress. Breath sounds: No wheezing. Chest:      Chest wall: No tenderness. Abdominal:      General: Bowel sounds are normal.      Palpations: Abdomen is soft. Tenderness: There is no abdominal tenderness. There is no rebound. Musculoskeletal:      Right lower leg: No edema. Left lower leg: No edema. Skin:     General: Skin is warm and dry. Neurological:      Mental Status: He is alert. Comments: Awake, alert, able to answer questions appropriately, able move extremities bilaterally.    Psychiatric:         Mood and Affect: Mood normal.         Behavior: Behavior normal.

## 2023-10-14 LAB — COLOGUARD RESULT REPORTABLE: NEGATIVE

## 2023-11-20 ENCOUNTER — TELEPHONE (OUTPATIENT)
Dept: CARDIOLOGY CLINIC | Facility: CLINIC | Age: 58
End: 2023-11-20

## 2023-11-20 DIAGNOSIS — R06.83 SNORING: Primary | ICD-10-CM

## 2023-11-20 NOTE — TELEPHONE ENCOUNTER
-I received message from patient's wife with some concerns of FMLA paperwork as I had not filled out to approved time off for hospitalizations or patient's appointments however patient's wife noted that he was just feeling fatigued the other day and she stayed home with him as she states she gets nervous when he does not always feel well and wants to be around him. I informed her that the paperwork should approve for any significant medical issue that he has however if there is a specific alternative she could also have this paperwork refilled by her primary care physician for patient. Patient's wife noted understanding and will be speaking with his primary care physician to have FMLA paperwork filled out for all of his medical conditions. I will also give referral to sleep medicine for patient as well. Will have office staff assist her in setting this up. Patient's wife notes that he feels well at this time and has no active complaints but she would like to make sure that she is around for all of his appointments and issues as she does note sometimes he under plays his medical concerns.

## 2023-12-03 DIAGNOSIS — I21.3 STEMI (ST ELEVATION MYOCARDIAL INFARCTION) (HCC): ICD-10-CM

## 2023-12-04 RX ORDER — ATORVASTATIN CALCIUM 80 MG/1
80 TABLET, FILM COATED ORAL EVERY EVENING
Qty: 30 TABLET | Refills: 5 | Status: SHIPPED | OUTPATIENT
Start: 2023-12-04

## 2024-03-05 DIAGNOSIS — I21.3 STEMI (ST ELEVATION MYOCARDIAL INFARCTION) (HCC): ICD-10-CM

## 2024-03-06 ENCOUNTER — HOSPITAL ENCOUNTER (EMERGENCY)
Facility: HOSPITAL | Age: 59
Discharge: HOME | End: 2024-03-07
Attending: EMERGENCY MEDICINE
Payer: COMMERCIAL

## 2024-03-06 ENCOUNTER — APPOINTMENT (EMERGENCY)
Dept: RADIOLOGY | Facility: HOSPITAL | Age: 59
End: 2024-03-06
Payer: COMMERCIAL

## 2024-03-06 DIAGNOSIS — R07.9 CHEST PAIN: Primary | ICD-10-CM

## 2024-03-06 LAB
ALBUMIN SERPL BCP-MCNC: 4.6 G/DL (ref 3.5–5)
ALP SERPL-CCNC: 92 U/L (ref 34–104)
ALT SERPL W P-5'-P-CCNC: 40 U/L (ref 7–52)
ANION GAP SERPL CALCULATED.3IONS-SCNC: 8 MMOL/L
APTT PPP: 31 SECONDS (ref 23–37)
AST SERPL W P-5'-P-CCNC: 33 U/L (ref 13–39)
BASOPHILS # BLD AUTO: 0.08 THOUSANDS/ÂΜL (ref 0–0.1)
BASOPHILS NFR BLD AUTO: 1 % (ref 0–1)
BILIRUB SERPL-MCNC: 0.81 MG/DL (ref 0.2–1)
BUN SERPL-MCNC: 21 MG/DL (ref 5–25)
CALCIUM SERPL-MCNC: 9.7 MG/DL (ref 8.4–10.2)
CARDIAC TROPONIN I PNL SERPL HS: 4 NG/L
CHLORIDE SERPL-SCNC: 102 MMOL/L (ref 96–108)
CO2 SERPL-SCNC: 27 MMOL/L (ref 21–32)
CREAT SERPL-MCNC: 1.04 MG/DL (ref 0.6–1.3)
EOSINOPHIL # BLD AUTO: 0.22 THOUSAND/ÂΜL (ref 0–0.61)
EOSINOPHIL NFR BLD AUTO: 3 % (ref 0–6)
ERYTHROCYTE [DISTWIDTH] IN BLOOD BY AUTOMATED COUNT: 12.6 % (ref 11.6–15.1)
GFR SERPL CREATININE-BSD FRML MDRD: 78 ML/MIN/1.73SQ M
GLUCOSE SERPL-MCNC: 115 MG/DL (ref 65–140)
HCT VFR BLD AUTO: 48 % (ref 36.5–49.3)
HGB BLD-MCNC: 16.1 G/DL (ref 12–17)
IMM GRANULOCYTES # BLD AUTO: 0.02 THOUSAND/UL (ref 0–0.2)
IMM GRANULOCYTES NFR BLD AUTO: 0 % (ref 0–2)
INR PPP: 0.96 (ref 0.84–1.19)
LYMPHOCYTES # BLD AUTO: 1.73 THOUSANDS/ÂΜL (ref 0.6–4.47)
LYMPHOCYTES NFR BLD AUTO: 21 % (ref 14–44)
MCH RBC QN AUTO: 28.2 PG (ref 26.8–34.3)
MCHC RBC AUTO-ENTMCNC: 33.5 G/DL (ref 31.4–37.4)
MCV RBC AUTO: 84 FL (ref 82–98)
MONOCYTES # BLD AUTO: 0.81 THOUSAND/ÂΜL (ref 0.17–1.22)
MONOCYTES NFR BLD AUTO: 10 % (ref 4–12)
NEUTROPHILS # BLD AUTO: 5.46 THOUSANDS/ÂΜL (ref 1.85–7.62)
NEUTS SEG NFR BLD AUTO: 65 % (ref 43–75)
NRBC BLD AUTO-RTO: 0 /100 WBCS
PLATELET # BLD AUTO: 198 THOUSANDS/UL (ref 149–390)
PMV BLD AUTO: 10 FL (ref 8.9–12.7)
POTASSIUM SERPL-SCNC: 3.5 MMOL/L (ref 3.5–5.3)
PROT SERPL-MCNC: 7.6 G/DL (ref 6.4–8.4)
PROTHROMBIN TIME: 13 SECONDS (ref 11.6–14.5)
RBC # BLD AUTO: 5.7 MILLION/UL (ref 3.88–5.62)
SODIUM SERPL-SCNC: 137 MMOL/L (ref 135–147)
WBC # BLD AUTO: 8.32 THOUSAND/UL (ref 4.31–10.16)

## 2024-03-06 PROCEDURE — 93005 ELECTROCARDIOGRAM TRACING: CPT

## 2024-03-06 PROCEDURE — 99285 EMERGENCY DEPT VISIT HI MDM: CPT | Performed by: EMERGENCY MEDICINE

## 2024-03-06 PROCEDURE — 85610 PROTHROMBIN TIME: CPT | Performed by: EMERGENCY MEDICINE

## 2024-03-06 PROCEDURE — 36415 COLL VENOUS BLD VENIPUNCTURE: CPT | Performed by: EMERGENCY MEDICINE

## 2024-03-06 PROCEDURE — 99285 EMERGENCY DEPT VISIT HI MDM: CPT

## 2024-03-06 PROCEDURE — 85730 THROMBOPLASTIN TIME PARTIAL: CPT | Performed by: EMERGENCY MEDICINE

## 2024-03-06 PROCEDURE — 71045 X-RAY EXAM CHEST 1 VIEW: CPT

## 2024-03-06 PROCEDURE — 84484 ASSAY OF TROPONIN QUANT: CPT | Performed by: EMERGENCY MEDICINE

## 2024-03-06 PROCEDURE — 80053 COMPREHEN METABOLIC PANEL: CPT | Performed by: EMERGENCY MEDICINE

## 2024-03-06 PROCEDURE — 85025 COMPLETE CBC W/AUTO DIFF WBC: CPT | Performed by: EMERGENCY MEDICINE

## 2024-03-07 VITALS
RESPIRATION RATE: 14 BRPM | DIASTOLIC BLOOD PRESSURE: 77 MMHG | OXYGEN SATURATION: 96 % | SYSTOLIC BLOOD PRESSURE: 123 MMHG | HEART RATE: 60 BPM | TEMPERATURE: 97.7 F

## 2024-03-07 LAB
2HR DELTA HS TROPONIN: 0 NG/L
ATRIAL RATE: 55 BPM
ATRIAL RATE: 62 BPM
CARDIAC TROPONIN I PNL SERPL HS: 4 NG/L
P AXIS: 46 DEGREES
P AXIS: 52 DEGREES
PR INTERVAL: 194 MS
PR INTERVAL: 196 MS
QRS AXIS: 32 DEGREES
QRS AXIS: 34 DEGREES
QRSD INTERVAL: 92 MS
QRSD INTERVAL: 94 MS
QT INTERVAL: 406 MS
QT INTERVAL: 424 MS
QTC INTERVAL: 405 MS
QTC INTERVAL: 412 MS
T WAVE AXIS: 22 DEGREES
T WAVE AXIS: 29 DEGREES
VENTRICULAR RATE: 55 BPM
VENTRICULAR RATE: 62 BPM

## 2024-03-07 PROCEDURE — 84484 ASSAY OF TROPONIN QUANT: CPT | Performed by: EMERGENCY MEDICINE

## 2024-03-07 PROCEDURE — 93010 ELECTROCARDIOGRAM REPORT: CPT | Performed by: INTERNAL MEDICINE

## 2024-03-07 PROCEDURE — 93005 ELECTROCARDIOGRAM TRACING: CPT

## 2024-03-07 PROCEDURE — 36415 COLL VENOUS BLD VENIPUNCTURE: CPT | Performed by: EMERGENCY MEDICINE

## 2024-03-07 NOTE — ED CARE HANDOFF
Emergency Department Sign Out Note        Sign out and transfer of care from Dr Saldana. See Separate Emergency Department note.     The patient, Oscar Anton, was evaluated by the previous provider for CP.    Workup Completed:  Cardiac work up    ED Course / Workup Pending (followup):          HEART Risk Score      Flowsheet Row Most Recent Value   Heart Score Risk Calculator    History 0 Filed at: 03/06/2024 2341   ECG 0 Filed at: 03/06/2024 2341   Age 1 Filed at: 03/06/2024 2341   Risk Factors 2 Filed at: 03/06/2024 2341   Troponin 0 Filed at: 03/06/2024 2341   HEART Score 3 Filed at: 03/06/2024 2341                                    ED Course as of 03/07/24 0414   Thu Mar 07, 2024   0024 Sign out:     Pt here w/ acute CP  Post prandial  Pt had Semi 1 year ago  Likely not caridac at all    Plan:  Just needs delta trop and if normal pt can be safely discharged    0125 Delta 2hr hsTnI: 0   0126 Pt understands work up results  No concerning findings    Pt feels much much better    he has no signs of life or limb threatening process    I offered further tx, I offered admission, if he felt ill, or his pain was too great, but he declined  he is ready to manage from home  he is very appreciative of his ED care and is ready to manage from home  he understands return precautions    he will f/u w/ PCP tomorrow, as well as Cardiology        ECG 12 Lead Documentation Only    Date/Time: 3/7/2024 12:48 AM    Performed by: Shahab Mcwilliams MD  Authorized by: Shahab Mcwilliams MD    Indications / Diagnosis:  Cp  Patient location:  ED  Interpretation:     Interpretation: non-specific    Rate:     ECG rate:  55    ECG rate assessment: bradycardic    Rhythm:     Rhythm: sinus bradycardia    Ectopy:     Ectopy: none    QRS:     QRS axis:  Normal  Conduction:     Conduction: normal    ST segments:     ST segments:  Non-specific  T waves:     T waves: non-specific      Medical Decision Making  Patient with history as above  presented with Patient presents with:  Chest Pain: Started yesterday after eating some food; pain increased today after eating some spicy food; 2/10 pain; left sided chest pain    History obtained from Sign out, patient    Patient was nontoxic, stable. Ambulatory. Exam as above.    EKG reviewed.    Labs reviewed.    Differential diagnosis considered. Overall presentation is consistent with GERD. Low suspicion for ACS, pe, tad, pna, life or limb threatening process.    Patient did not require treatment and symptoms resolved    Consideration was given for admission, but the patient's work up results, stable ED course, improvement and desire for DC all supported the decision that the pt was stable for outpatient management.    Disposition:   Discussed need to follow up with PCP and Cardiology  Discharged with instructions to obtain outpatient follow up of patient's symptoms and findings, with strict return precautions if patient develops new or worsening symptoms.      This medical documentation was created using an electronic medical record system with M Modal voice recognition.  Although this document has been carefully reviewed, there may still be some phonetic and typographical errors.  These errors are purely typographical and due to imperfections of the software program, do not reflect any compromise in the patient's medical care.        Amount and/or Complexity of Data Reviewed  Labs: ordered. Decision-making details documented in ED Course.  Radiology: ordered and independent interpretation performed.    Risk  OTC drugs.  Prescription drug management.            Disposition  Final diagnoses:   None     ED Disposition       None          Follow-up Information       Follow up With Specialties Details Why Contact [facility], Nurse Practitioner On 3/12/2024  [address] [place] [address].  [facility] [address]  [phone]            Patient's Medications   Discharge Prescriptions    No  medications on file     No discharge procedures on file.       ED Provider  Electronically Signed by     Shahab Mcwilliams MD  03/07/24 0413

## 2024-03-07 NOTE — DISCHARGE INSTRUCTIONS
Return to the ER with any new, concerning, or worsening issues.    Be reevaluated by your cardiologist as well as your family doctor within a week, and consider utilizing an over-the-counter antacid such as omeprazole over the next few days to treat symptoms if they are due to reflux.  Your EKG and 2 troponin tests are negative for acute damage.

## 2024-03-07 NOTE — ED PROVIDER NOTES
"History  Chief Complaint   Patient presents with   • Chest Pain     Started yesterday after eating some food; pain increased today after eating some spicy food; 2/10 pain; left sided chest pain     58-year-old male presents emergency department complaining of chest pain that started yesterday and resolved after Tums, but increased to today after eating spicy food.  Patient does have a history of coronary artery disease and has had a STEMI at roughly a year ago.  Patient sees cardiology and is here today due to increasing concern regarding chest pain.  Patient notes that pain today feels more like an indigestion and does not feel anything like his previous MI.  The patient notes that because he has 2 stents and has had a heart attack in the past, he \"does not take any chances.\"  Patient denies shortness of breath or diaphoresis.  Pain at the time my evaluation is 1-2 out of 10.        Prior to Admission Medications   Prescriptions Last Dose Informant Patient Reported? Taking?   APPLE CIDER VINEGAR PO   Yes No   Sig: Take by mouth   Omega-3 Fatty Acids (fish oil) 1,000 mg   Yes No   Sig: Take 1,000 mg by mouth 2 (two) times a day   aspirin 81 mg chewable tablet  Self No No   Sig: Chew 1 tablet (81 mg total) daily Do not start before March 24, 2023.   atorvastatin (LIPITOR) 80 mg tablet   No No   Sig: take 1 tablet by mouth every evening   metoprolol tartrate (LOPRESSOR) 25 mg tablet   No No   Sig: take 1 tablet by mouth every 12 hours   nitroglycerin (NITROSTAT) 0.4 mg SL tablet  Self No No   Sig: Place 1 tablet (0.4 mg total) under the tongue every 5 (five) minutes as needed for chest pain   ticagrelor (Brilinta) 90 MG   No No   Sig: Take 1 tablet (90 mg total) by mouth every 12 (twelve) hours      Facility-Administered Medications: None       Past Medical History:   Diagnosis Date   • Coronary artery disease    • ST elevation MI (STEMI) 03/21/2023       Past Surgical History:   Procedure Laterality Date   • CARDIAC " CATHETERIZATION N/A 3/21/2023    Procedure: Cardiac pci;  Surgeon: Reno Pike MD;  [facility];  Service: Cardiology   • CARDIAC CATHETERIZATION N/A 3/21/2023    Procedure: Cardiac Coronary Angiogram;  Surgeon: Reno Pike MD;  [facility];  Service: Cardiology   • CARDIAC CATHETERIZATION N/A 3/27/2023    Procedure: Cardiac pci;  Surgeon: Reno Pike MD;  [facility];  Service: Cardiology   • FINGER SURGERY Right     Amputation of index finger with neurectomy (each), distal amputation        Family History   Problem Relation Age of Onset   • No Known Problems Mother    • Coronary artery disease Father    • Kidney failure Father    • Heart attack Father    • Diabetes type II Father    • Heart attack Brother 41        had stents     I have reviewed and agree with the history as documented.    E-Cigarette/Vaping   • E-Cigarette Use Never User      E-Cigarette/Vaping Substances     Social History     Tobacco Use   • Smoking status: Never   • Smokeless tobacco: Former   Vaping Use   • Vaping status: Never Used   Substance Use Topics   • Alcohol use: Not Currently     Comment: once per month   • Drug use: No       Review of Systems   Constitutional:  Positive for activity change. Negative for chills and fever.   HENT:  Negative for ear pain and sore throat.    Eyes:  Negative for pain and visual disturbance.   Respiratory:  Negative for cough and shortness of breath.    Cardiovascular:  Positive for chest pain. Negative for palpitations.   Gastrointestinal:  Positive for nausea. Negative for abdominal pain and vomiting.   Genitourinary:  Negative for dysuria and hematuria.   Musculoskeletal:  Negative for arthralgias and back pain.   Skin:  Negative for color change and rash.   Neurological:  Negative for seizures and syncope.   All other systems reviewed and are negative.      Physical Exam  Physical Exam  Constitutional:       General: He is not in acute  distress.     Appearance: Normal appearance. He is normal weight. He is not ill-appearing.   HENT:      Head: Normocephalic and atraumatic.      Right Ear: External ear normal.      Left Ear: External ear normal.      Nose: Nose normal.      Mouth/Throat:      Mouth: Mucous membranes are moist.   Eyes:      Conjunctiva/sclera: Conjunctivae normal.   Cardiovascular:      Rate and Rhythm: Normal rate and regular rhythm.      Pulses: Normal pulses.      Heart sounds: Normal heart sounds.   Pulmonary:      Effort: Pulmonary effort is normal.      Breath sounds: Normal breath sounds. No decreased breath sounds, wheezing or rhonchi.   Abdominal:      General: Abdomen is flat. There is no distension.      Palpations: Abdomen is soft. There is no mass.   Musculoskeletal:         General: No swelling, tenderness or deformity. Normal range of motion.      Cervical back: Normal range of motion.   Skin:     General: Skin is warm and dry.      Capillary Refill: Capillary refill takes 2 to 3 seconds.      Coloration: Skin is not pale.   Neurological:      General: No focal deficit present.      Mental Status: He is alert and oriented to person, place, and time. Mental status is at baseline.   Psychiatric:         Mood and Affect: Mood normal.         Vital Signs  ED Triage Vitals [03/06/24 2234]   Temperature Pulse Respirations Blood Pressure SpO2   97.7 °F (36.5 °C) 70 18 138/77 95 %      Temp src Heart Rate Source Patient Position - Orthostatic VS BP Location FiO2 (%)   -- Monitor -- Left arm --      Pain Score       2           Vitals:    03/06/24 2234   BP: 138/77   Pulse: 70         Visual Acuity      ED Medications  Medications - No data to display    Diagnostic Studies  Results Reviewed       Procedure Component Value Units Date/Time    CBC and differential [[id number]]     Lab Status: No result Specimen: Blood     Protime-INR [[id number]]     Lab Status: No result Specimen: Blood     APTT [[id number]]     Lab Status: No  result Specimen: Blood     Comprehensive metabolic panel [[id number]]     Lab Status: No result Specimen: Blood     HS Troponin 0hr (reflex protocol) [[id number]]     Lab Status: No result Specimen: Blood                    XR chest 1 view portable    (Results Pending)              Procedures  ECG 12 Lead Documentation Only    Date/Time: 3/6/2024 10:38 PM    Performed by: Christopher Saldana Jr., DO  Authorized by: Christopher Saldana Jr., DO    ECG reviewed by me, the ED Provider: yes    Patient location:  ED  Comments:      EKG shows a sinus rhythm at 62 beats a minute with a normal axis.  There is no definitive acute ST or T wave changes present.           ED Course  ED Course as of 03/07/24 0024   Wed Mar 06, 2024   2324 hs TnI 0hr: 4   Thu Mar 07, 2024   0024 Case signout to Dr. Mcwilliams pending delta troponin.                                             Medical Decision Making  58-year-old male presents emergency department complaining of pain in his left upper chest and substernal region which he states feels like indigestion and.  Patient is had an MI roughly a year ago and states that the pain does not feel similar to this.  Patient notes that he ate fish with tartar sauce that had horseradish and and started getting pain afterwards.  Patient notes that after eating some food yesterday he also had pain.  Patient notes that the pain was relieved with Tums.  Because the pain was back today and seem to be more severe than it was earlier on, he decided to come to the hospital due to his cardiac history.  Patient denies shortness of breath or diaphoresis.  Differential diagnosis upon my evaluation is indigestion atypical chest pain, musculoskeletal chest pain or acute coronary syndrome.  EKG is without evidence of acute ST or T wave changes, and troponin is less than 5.  Patient will be kept for a secondary troponin test and if negative can be discharged.    Amount and/or Complexity of Data Reviewed  Labs: ordered.  Decision-making details documented in ED Course.  Radiology: ordered and independent interpretation performed.             Disposition  Final diagnoses:   None     ED Disposition       None          Follow-up Information    None         Patient's Medications   Discharge Prescriptions    No medications on file       No discharge procedures on file.    PDMP Review       None            ED Provider  Electronically Signed by             Christopher Saldana Jr.,   03/07/24 0025

## 2024-03-18 DIAGNOSIS — I21.3 STEMI (ST ELEVATION MYOCARDIAL INFARCTION) (HCC): ICD-10-CM

## 2024-04-06 ENCOUNTER — APPOINTMENT (OUTPATIENT)
Dept: LAB | Facility: HOSPITAL | Age: 59
End: 2024-04-06
Payer: COMMERCIAL

## 2024-04-06 DIAGNOSIS — E78.00 HYPERCHOLESTEREMIA: ICD-10-CM

## 2024-04-06 LAB
CHOLEST SERPL-MCNC: 111 MG/DL
HDLC SERPL-MCNC: 28 MG/DL
LDLC SERPL CALC-MCNC: 40 MG/DL (ref 0–100)
TRIGL SERPL-MCNC: 213 MG/DL

## 2024-04-06 PROCEDURE — 80061 LIPID PANEL: CPT

## 2024-04-06 PROCEDURE — 36415 COLL VENOUS BLD VENIPUNCTURE: CPT

## 2024-04-12 ENCOUNTER — OFFICE VISIT (OUTPATIENT)
Dept: CARDIOLOGY CLINIC | Facility: CLINIC | Age: 59
End: 2024-04-12

## 2024-04-12 VITALS
HEART RATE: 72 BPM | SYSTOLIC BLOOD PRESSURE: 120 MMHG | HEIGHT: 69 IN | BODY MASS INDEX: 27.55 KG/M2 | DIASTOLIC BLOOD PRESSURE: 88 MMHG | WEIGHT: 186 LBS

## 2024-04-12 DIAGNOSIS — I25.10 CORONARY ARTERY DISEASE INVOLVING NATIVE CORONARY ARTERY OF NATIVE HEART WITHOUT ANGINA PECTORIS: Primary | ICD-10-CM

## 2024-04-12 DIAGNOSIS — E78.00 HYPERCHOLESTEREMIA: ICD-10-CM

## 2024-04-12 DIAGNOSIS — I07.1 TRICUSPID VALVE INSUFFICIENCY, UNSPECIFIED ETIOLOGY: ICD-10-CM

## 2024-04-12 DIAGNOSIS — Z95.5 STATUS POST INSERTION OF DRUG ELUTING CORONARY ARTERY STENT: ICD-10-CM

## 2024-04-12 DIAGNOSIS — R03.0 ELEVATED BP WITHOUT DIAGNOSIS OF HYPERTENSION: ICD-10-CM

## 2024-04-12 RX ORDER — CLOPIDOGREL BISULFATE 75 MG/1
75 TABLET ORAL DAILY
Qty: 90 TABLET | Refills: 3 | Status: SHIPPED | OUTPATIENT
Start: 2024-04-14

## 2024-04-12 RX ORDER — CLOPIDOGREL 300 MG/1
600 TABLET, FILM COATED ORAL ONCE
Qty: 2 TABLET | Refills: 0 | Status: SHIPPED | OUTPATIENT
Start: 2024-04-13 | End: 2024-04-13

## 2024-04-12 NOTE — PROGRESS NOTES
Cardiology Follow up  Oscar Anton  [id number]  1965  [facility] [place] [address] [address] [facility] [address]      1. Coronary artery disease involving native coronary artery of native heart without angina pectoris  clopidogrel (PLAVIX) 300 mg    clopidogrel (Plavix) 75 mg tablet      2. BMI 27.0-27.9,adult        3. Hypercholesteremia        4. Tricuspid valve insufficiency, unspecified etiology        5. Elevated BP without diagnosis of hypertension        6. Status post insertion of drug eluting coronary artery stent  clopidogrel (PLAVIX) 300 mg    clopidogrel (Plavix) 75 mg tablet          Discussion/Summary:  1.  ST segment elevation MI 3/21/2023 with ischemic cardiomyopathy  2.  Multivessel coronary artery disease with PCI to RCA 3/21/2023 and PCI to LAD 3/27/2023 with nonobstructive circumflex disease  3.  Overweight  4.  Hyperlipidemia  5.  Tricuspid regurgitation  6.  Elevated blood-pressure reading without diagnosis of hypertension    -Transthoracic echocardiogram 3/22/2023 showing left regular systolic function normal estimated LVEF 60% with grade 1 diastolic function and hypokinesis of the basal inferior wall with sigmoid appearance of the septum with normal right ventricular systolic function and mild to moderate tricuspid regurgitation  -Fasting lipid panel 4/6/2024 showing total cholesterol 111, triglyceride 213, HDL 28, LDL 40  -Patient will continue atorvastatin 80 mg daily, metoprolol tartrate 25 mg twice daily, and will transition patient from aspirin and Brilinta to Plavix monotherapy with initial loading dose of 600 mg followed by 75 mg daily thereafter  -After discussion with patient he wishes to trial dietary and lifestyle modifications prior to up titration of medical therapy for elevated triglyceride levels  -Patient will continue to be physically  active monitor home blood pressure readings and let our office know if there are any significant issues  -I will see patient in 6 months or sooner if necessary and repeat fasting lipid panel and CMP prior to that office visit  -Patient counseled if he were to have any warning or alarm type symptoms he is to seek emergency medical care immediately      History of Present Illness:  -Patient is a 58-year-old male with inferior ST segment elevation MI on 3/21/2023 who initially presented to [facility] and was transferred to [facility] in [place] at that time found to have multivessel coronary artery disease with PCI to culprit lesion in mid RCA via right femoral access closed with Perclose due to severe radial loop that prohibited right radial artery access and underwent staged procedure 3/27/2023 with PCI to LAD with successful IVUS guided PCI to proximal LAD again via femoral access who presents to the office today for scheduled follow-up.  -Currently in the office today patient denies any chest pain, palpitations, lightness or dizziness, loss of consciousness, shortness of breath, lower extremity edema, orthopnea or bendopnea.  He notes significant increase in physical activity particularly since he just recently obtained a puppy and otherwise is doing well.  He notes continuing to attempt aggressive dietary and lifestyle modifications to improve his overall physical capacity and health.    Patient Active Problem List   Diagnosis    Hypercholesteremia    BMI 27.0-27.9,adult    Non-traumatic rhabdomyolysis    Elevated BP without diagnosis of hypertension    Other fatigue    STEMI (ST elevation myocardial infarction) (HCC)    Status post insertion of drug eluting coronary artery stent    Coronary artery disease involving native coronary artery    Tricuspid valve insufficiency     Past Medical History:   Diagnosis Date    Coronary artery disease     Gastroesophageal reflux disease, unspecified  whether esophagitis present     Hyperlipidemia     ST elevation MI (STEMI) 03/21/2023     Social History     Socioeconomic History    Marital status: /Civil Union     Spouse name: Not on file    Number of children: 2    Years of education: Not on file    Highest education level: Not on file   Occupational History    Occupation:     Tobacco Use    Smoking status: Never    Smokeless tobacco: Former   Vaping Use    Vaping status: Never Used   Substance and Sexual Activity    Alcohol use: Not Currently     Comment: once per month    Drug use: No    Sexual activity: Not on file   Other Topics Concern    Not on file   Social History Narrative    Not on file     Social Determinants of Health     Financial Resource Strain: Not on file   Food Insecurity: No Food Insecurity (3/22/2023)    Hunger Vital Sign     Worried About Running Out of Food in the Last Year: Never true     Ran Out of Food in the Last Year: Never true   Transportation Needs: No Transportation Needs (3/22/2023)    PRAPARE - Transportation     Lack of Transportation (Medical): No     Lack of Transportation (Non-Medical): No   Physical Activity: Not on file   Stress: Not on file   Social Connections: Not on file   Intimate Partner Violence: Not on file   Housing Stability: Low Risk  (3/22/2023)    Housing Stability Vital Sign     Unable to Pay for Housing in the Last Year: No     Number of Places Lived in the Last Year: 1     Unstable Housing in the Last Year: No      Family History   Problem Relation Age of Onset    No Known Problems Mother     Coronary artery disease Father     Kidney failure Father     Heart attack Father     Diabetes type II Father     Heart attack Brother 41        had stents     Past Surgical History:   Procedure Laterality Date    CARDIAC CATHETERIZATION N/A 3/21/2023    Procedure: Cardiac pci;  Surgeon: Reno Pike MD;  [facility];  Service: Cardiology    CARDIAC CATHETERIZATION N/A  3/21/2023    Procedure: Cardiac Coronary Angiogram;  Surgeon: Reno Pike MD;  [facility];  Service: Cardiology    CARDIAC CATHETERIZATION N/A 3/27/2023    Procedure: Cardiac pci;  Surgeon: Reno Pike MD;  [facility];  Service: Cardiology    FINGER SURGERY Right     Amputation of index finger with neurectomy (each), distal amputation        Current Outpatient Medications:     APPLE CIDER VINEGAR PO, Take by mouth, Disp: , Rfl:     atorvastatin (LIPITOR) 80 mg tablet, take 1 tablet by mouth every evening, Disp: 30 tablet, Rfl: 5    [START ON 4/13/2024] clopidogrel (PLAVIX) 300 mg, Take 2 tablets (600 mg total) by mouth once for 1 dose Do not start before April 13, 2024., Disp: 2 tablet, Rfl: 0    [START ON 4/14/2024] clopidogrel (Plavix) 75 mg tablet, Take 1 tablet (75 mg total) by mouth daily Do not start before April 14, 2024., Disp: 90 tablet, Rfl: 3    metoprolol tartrate (LOPRESSOR) 25 mg tablet, take 1 tablet by mouth every 12 hours, Disp: 60 tablet, Rfl: 5    nitroglycerin (NITROSTAT) 0.4 mg SL tablet, Place 1 tablet (0.4 mg total) under the tongue every 5 (five) minutes as needed for chest pain, Disp: 30 tablet, Rfl: 2    Omega-3 Fatty Acids (fish oil) 1,000 mg, Take 1,000 mg by mouth 2 (two) times a day, Disp: , Rfl:   No Known Allergies      Labs:  Appointment on 04/06/2024   Component Date Value    Cholesterol 04/06/2024 111     Triglycerides 04/06/2024 213 (H)     HDL, Direct 04/06/2024 28 (L)     LDL Calculated 04/06/2024 40    Admission on 03/06/2024, Discharged on 03/07/2024   Component Date Value    WBC 03/06/2024 8.32     RBC 03/06/2024 5.70 (H)     Hemoglobin 03/06/2024 16.1     Hematocrit 03/06/2024 48.0     MCV 03/06/2024 84     MCH 03/06/2024 28.2     MCHC 03/06/2024 33.5     RDW 03/06/2024 12.6     MPV 03/06/2024 10.0     Platelets 03/06/2024 198     nRBC 03/06/2024 0     Segmented % 03/06/2024 65     Immature Grans % 03/06/2024 0     Lymphocytes %  03/06/2024 21     Monocytes % 03/06/2024 10     Eosinophils Relative 03/06/2024 3     Basophils Relative 03/06/2024 1     Absolute Neutrophils 03/06/2024 5.46     Absolute Immature Grans 03/06/2024 0.02     Absolute Lymphocytes 03/06/2024 1.73     Absolute Monocytes 03/06/2024 0.81     Eosinophils Absolute 03/06/2024 0.22     Basophils Absolute 03/06/2024 0.08     Protime 03/06/2024 13.0     INR 03/06/2024 0.96     PTT 03/06/2024 31     Sodium 03/06/2024 137     Potassium 03/06/2024 3.5     Chloride 03/06/2024 102     CO2 03/06/2024 27     ANION GAP 03/06/2024 8     BUN 03/06/2024 21     Creatinine 03/06/2024 1.04     Glucose 03/06/2024 115     Calcium 03/06/2024 9.7     AST 03/06/2024 33     ALT 03/06/2024 40     Alkaline Phosphatase 03/06/2024 92     Total Protein 03/06/2024 7.6     Albumin 03/06/2024 4.6     Total Bilirubin 03/06/2024 0.81     eGFR 03/06/2024 78     hs TnI 0hr 03/06/2024 4     Ventricular Rate 03/06/2024 62     Atrial Rate 03/06/2024 62     HI Interval 03/06/2024 196     QRSD Interval 03/06/2024 92     QT Interval 03/06/2024 406     QTC Interval 03/06/2024 412     P Axis 03/06/2024 52     QRS Axis 03/06/2024 34     T Wave Viola 03/06/2024 29     hs TnI 2hr 03/07/2024 4     Delta 2hr hsTnI 03/07/2024 0     Ventricular Rate 03/07/2024 55     Atrial Rate 03/07/2024 55     HI Interval 03/07/2024 194     QRSD Interval 03/07/2024 94     QT Interval 03/07/2024 424     QTC Interval 03/07/2024 405     P Viola 03/07/2024 46     QRS Axis 03/07/2024 32     T Wave Viola 03/07/2024 22         Imaging: No results found.      Review of Systems:  Review of Systems   Constitutional:  Negative for chills, diaphoresis, fatigue and fever.   HENT:  Negative for trouble swallowing and voice change.    Eyes:  Negative for pain and redness.   Respiratory:  Negative for shortness of breath and wheezing.    Cardiovascular:  Negative for chest pain, palpitations and leg swelling.   Gastrointestinal:  Negative for abdominal  "pain, constipation, diarrhea, nausea and vomiting.   Genitourinary:  Negative for dysuria.   Musculoskeletal:  Positive for arthralgias. Negative for neck pain and neck stiffness.   Skin:  Negative for rash.   Neurological:  Negative for dizziness, syncope, light-headedness and headaches.   Psychiatric/Behavioral:  Negative for agitation and confusion.    All other systems reviewed and are negative.        Vitals:    04/12/24 1512   BP: 120/88   Pulse: 72   Weight: 84.4 kg (186 lb)   Height: 5' 9\" (1.753 m)     Vitals:    04/12/24 1512   Weight: 84.4 kg (186 lb)     Height: 5' 9\" (175.3 cm)     Physical Exam:   Physical Exam  Vitals reviewed.   Constitutional:       General: He is not in acute distress.     Appearance: Normal appearance. He is not diaphoretic.   HENT:      Head: Normocephalic and atraumatic.   Eyes:      General:         Right eye: No discharge.         Left eye: No discharge.   Neck:      Comments: Trachea midline, no JVD  Cardiovascular:      Rate and Rhythm: Normal rate and regular rhythm.   Pulmonary:      Effort: Pulmonary effort is normal. No respiratory distress.      Breath sounds: No wheezing.   Chest:      Chest wall: No tenderness.   Abdominal:      General: Bowel sounds are normal.      Palpations: Abdomen is soft.      Tenderness: There is no abdominal tenderness. There is no rebound.   Musculoskeletal:      Right lower leg: No edema.      Left lower leg: No edema.   Skin:     General: Skin is warm and dry.   Neurological:      Mental Status: He is alert.      Comments: Awake, alert, able to answer questions appropriately, able to move extremities bilaterally.   Psychiatric:         Mood and Affect: Mood normal.         Behavior: Behavior normal.        "

## 2024-05-27 DIAGNOSIS — I21.3 STEMI (ST ELEVATION MYOCARDIAL INFARCTION) (HCC): ICD-10-CM

## 2024-05-27 RX ORDER — ATORVASTATIN CALCIUM 80 MG/1
80 TABLET, FILM COATED ORAL EVERY EVENING
Qty: 30 TABLET | Refills: 5 | Status: SHIPPED | OUTPATIENT
Start: 2024-05-27

## 2024-10-03 ENCOUNTER — RA CDI HCC (OUTPATIENT)
Dept: OTHER | Facility: HOSPITAL | Age: 59
End: 2024-10-03

## 2024-10-18 ENCOUNTER — TELEPHONE (OUTPATIENT)
Age: 59
End: 2024-10-18

## 2024-10-18 NOTE — TELEPHONE ENCOUNTER
Spoke with patient, states he has an appointment next Thursday and is inquiring if he needs any labs before then.    Reviewed chart, advised CMP, lipid panel are ordered prior to his appointment. Verbalized understanding.

## 2024-10-19 ENCOUNTER — APPOINTMENT (OUTPATIENT)
Dept: LAB | Facility: HOSPITAL | Age: 59
End: 2024-10-19
Payer: COMMERCIAL

## 2024-10-19 DIAGNOSIS — I25.10 CORONARY ARTERY DISEASE INVOLVING NATIVE CORONARY ARTERY OF NATIVE HEART WITHOUT ANGINA PECTORIS: ICD-10-CM

## 2024-10-19 LAB
ALBUMIN SERPL BCG-MCNC: 4.3 G/DL (ref 3.5–5)
ALP SERPL-CCNC: 80 U/L (ref 34–104)
ALT SERPL W P-5'-P-CCNC: 31 U/L (ref 7–52)
ANION GAP SERPL CALCULATED.3IONS-SCNC: 6 MMOL/L (ref 4–13)
AST SERPL W P-5'-P-CCNC: 25 U/L (ref 13–39)
BILIRUB SERPL-MCNC: 0.93 MG/DL (ref 0.2–1)
BUN SERPL-MCNC: 19 MG/DL (ref 5–25)
CALCIUM SERPL-MCNC: 9.4 MG/DL (ref 8.4–10.2)
CHLORIDE SERPL-SCNC: 105 MMOL/L (ref 96–108)
CHOLEST SERPL-MCNC: 99 MG/DL
CO2 SERPL-SCNC: 28 MMOL/L (ref 21–32)
CREAT SERPL-MCNC: 0.97 MG/DL (ref 0.6–1.3)
GFR SERPL CREATININE-BSD FRML MDRD: 84 ML/MIN/1.73SQ M
GLUCOSE P FAST SERPL-MCNC: 97 MG/DL (ref 65–99)
HDLC SERPL-MCNC: 26 MG/DL
LDLC SERPL CALC-MCNC: 32 MG/DL (ref 0–100)
POTASSIUM SERPL-SCNC: 3.9 MMOL/L (ref 3.5–5.3)
PROT SERPL-MCNC: 6.9 G/DL (ref 6.4–8.4)
SODIUM SERPL-SCNC: 139 MMOL/L (ref 135–147)
TRIGL SERPL-MCNC: 204 MG/DL

## 2024-10-19 PROCEDURE — 80053 COMPREHEN METABOLIC PANEL: CPT

## 2024-10-19 PROCEDURE — 36415 COLL VENOUS BLD VENIPUNCTURE: CPT

## 2024-10-19 PROCEDURE — 80061 LIPID PANEL: CPT

## 2024-10-24 ENCOUNTER — OFFICE VISIT (OUTPATIENT)
Dept: CARDIOLOGY CLINIC | Facility: CLINIC | Age: 59
End: 2024-10-24
Payer: COMMERCIAL

## 2024-10-24 VITALS
BODY MASS INDEX: 27.7 KG/M2 | SYSTOLIC BLOOD PRESSURE: 144 MMHG | WEIGHT: 187 LBS | HEIGHT: 69 IN | HEART RATE: 68 BPM | DIASTOLIC BLOOD PRESSURE: 82 MMHG

## 2024-10-24 DIAGNOSIS — R03.0 ELEVATED BP WITHOUT DIAGNOSIS OF HYPERTENSION: ICD-10-CM

## 2024-10-24 DIAGNOSIS — E78.00 HYPERCHOLESTEREMIA: ICD-10-CM

## 2024-10-24 DIAGNOSIS — I07.1 TRICUSPID VALVE INSUFFICIENCY, UNSPECIFIED ETIOLOGY: ICD-10-CM

## 2024-10-24 DIAGNOSIS — I25.10 CORONARY ARTERY DISEASE INVOLVING NATIVE CORONARY ARTERY OF NATIVE HEART WITHOUT ANGINA PECTORIS: Primary | ICD-10-CM

## 2024-10-24 PROCEDURE — 99214 OFFICE O/P EST MOD 30 MIN: CPT | Performed by: INTERNAL MEDICINE

## 2024-10-24 NOTE — ASSESSMENT & PLAN NOTE
-Counseled on dietary and lifestyle modifications along with continued physical activity  -Continue to monitor

## 2024-10-24 NOTE — ASSESSMENT & PLAN NOTE
-Counseled patient on dietary and lifestyle modifications along with recommendations for up titration of medical therapy however at this time he declines but will continue atorvastatin 80 mg daily  -Will repeat fasting lipid panel in 6 months prior to next office visit  -Continue to monitor

## 2024-10-24 NOTE — ASSESSMENT & PLAN NOTE
With multivessel coronary artery disease with previous PCI to RCA and then LAD in March 2023  -Patient denies significant symptoms at this time  -Continue Plavix 75 mg daily, atorvastatin 80 mg daily, metoprolol tartrate 25 mg twice daily  -Counseled on dietary and lifestyle modifications will continue to monitor

## 2024-10-24 NOTE — PROGRESS NOTES
Patient ID: Oscar Anton is a 59 y.o. male.        Plan:      Assessment & Plan  Coronary artery disease involving native coronary artery of native heart without angina pectoris  With multivessel coronary artery disease with previous PCI to RCA and then LAD in March 2023  -Patient denies significant symptoms at this time  -Continue Plavix 75 mg daily, atorvastatin 80 mg daily, metoprolol tartrate 25 mg twice daily  -Counseled on dietary and lifestyle modifications will continue to monitor  Tricuspid valve insufficiency, unspecified etiology  -Mild-moderate on transthoracic echocardiogram March 2023  -Patient denies significant symptoms  -Will continue to monitor  Elevated BP without diagnosis of hypertension  -Counseled on dietary and lifestyle modifications including following a low-salt, low-fat, heart healthy diet with sodium restriction to less than 1800 mg of sodium daily and DASH diet  -Patient will continue metoprolol tartrate 25 mg twice daily  -Patient will monitor home blood pressure readings let our office know significantly elevated greater than 130s/80s mmHg for up titration of medical therapy  -Continue to monitor  BMI 27.0-27.9,adult  -Counseled on dietary and lifestyle modifications along with continued physical activity  -Continue to monitor  Hypercholesteremia  -Counseled patient on dietary and lifestyle modifications along with recommendations for up titration of medical therapy however at this time he declines but will continue atorvastatin 80 mg daily  -Will repeat fasting lipid panel in 6 months prior to next office visit  -Continue to monitor      Follow up Plan/Other summary comments:  -Lipid panel 10/19/2024 showing total cholesterol 99, triglyceride 204, HDL 26, LDL 32  -After discussion with patient and understand my recommendations for up titration of medical therapy he wishes to hold off at this time but will be even more aggressive with dietary and lifestyle modifications  -Patient  counseled on dietary and lifestyle modifications including following a low-salt, low-fat, heart healthy diet with sodium restriction to less than 1800 mg of sodium daily, continue physical activity and monitoring for symptoms along with DASH diet and NSAID avoidance  -Patient will continue atorvastatin 80 mg daily, Plavix 75 mg daily, metoprolol tartrate 25 mg twice daily  -Patient will monitor home blood pressure readings and let our office know if significantly elevated greater than 130/80's MHG for up titration of medical therapy  -Will see patient in 6 months or sooner if necessary and repeat fasting lipid panel prior to next office visit  -Patient counseled if he were to have any warning or alarm type symptoms he is to seek emergency medical care immediately.    HPI:   -Patient is a 59-year-old male with inferior ST segment elevation MI in March 2023 who initially at that time presented to [facility] and was transferred to [facility] in [place] found to have multivessel coronary artery disease with PCI to culprit lesion in mid RCA via right femoral access closed with Perclose due to severe radial loop that prohibited right radial artery access and then underwent staged procedure in March 2023 with PCI to LAD with successful IVUS guided PCI to proximal LAD via femoral access and presents to the office today for scheduled follow-up.  -Currently in the office patient denies any chest pain, palpitations, lightheadedness or dizziness, loss of consciousness, shortness of breath, lower extremity edema, orthopnea or bendopnea.  He notes his blood pressures at home are very well-controlled in the 120 mmHg systolic range.  He is very physically active with no significant anginal symptoms and he is improving his dietary and lifestyle modifications with no ill effects from current medical regimen.      Most recent or relevant cardiac/vascular testing:    -Transthoracic echocardiogram 3/22/2023  "showing left ventricular systolic function normal estimated LVEF 60% with grade 1 diastolic dysfunction, normal right ventricular systolic function with mild to moderate tricuspid regurgitation and hypokinesis of the basal inferior segment    -Cardiac catheterization 3/21/2023 showing multivessel coronary artery disease with successful PCI to mid RCA    -Cardiac catheterization 3/27/2023 with drug-eluting stent placement to proximal LAD      Past Surgical History:   Procedure Laterality Date    CARDIAC CATHETERIZATION N/A 3/21/2023    Procedure: Cardiac pci;  Surgeon: Reno Pike MD;  [facility]: [facility];  Service: Cardiology    CARDIAC CATHETERIZATION N/A 3/21/2023    Procedure: Cardiac Coronary Angiogram;  Surgeon: Reno Pike MD;  [facility];  Service: Cardiology    CARDIAC CATHETERIZATION N/A 3/27/2023    Procedure: Cardiac pci;  Surgeon: Reno Pike MD;  [facility];  Service: Cardiology    FINGER SURGERY Right     Amputation of index finger with neurectomy (each), distal amputation        Review of Systems   Review of Systems   Constitutional:  Negative for chills, diaphoresis, fatigue and fever.   HENT:  Negative for trouble swallowing and voice change.    Eyes:  Negative for pain and redness.   Respiratory:  Negative for shortness of breath and wheezing.    Cardiovascular:  Negative for chest pain, palpitations and leg swelling.   Gastrointestinal:  Negative for abdominal pain, blood in stool, constipation, diarrhea, nausea and vomiting.   Genitourinary:  Negative for dysuria.   Musculoskeletal:  Positive for arthralgias. Negative for neck pain and neck stiffness.   Skin:  Negative for rash.   Neurological:  Negative for dizziness, syncope, light-headedness and headaches.   Psychiatric/Behavioral:  Negative for agitation and confusion.    All other systems reviewed and are negative.         Objective:     /82   Pulse 68   Ht 5' 9\" (1.753 m)   Wt 84.8 " kg (187 lb)   BMI 27.62 kg/m²     PHYSICAL EXAM:  Physical Exam  Vitals reviewed.   Constitutional:       General: He is not in acute distress.     Appearance: He is obese. He is not diaphoretic.   HENT:      Head: Normocephalic and atraumatic.   Eyes:      General:         Right eye: No discharge.         Left eye: No discharge.   Neck:      Comments: Trachea midline, no JVD appreciated  Cardiovascular:      Rate and Rhythm: Normal rate and regular rhythm.      Heart sounds:      No friction rub.   Pulmonary:      Effort: Pulmonary effort is normal. No respiratory distress.      Breath sounds: No wheezing.   Chest:      Chest wall: No tenderness.   Abdominal:      General: Bowel sounds are normal.      Palpations: Abdomen is soft.      Tenderness: There is no abdominal tenderness. There is no rebound.   Musculoskeletal:      Right lower leg: No edema.      Left lower leg: No edema.   Skin:     General: Skin is warm and dry.   Neurological:      Mental Status: He is alert.      Comments: Awake, alert, able to answer questions appropriately, able to move extremities bilaterally.   Psychiatric:         Mood and Affect: Mood normal.         Behavior: Behavior normal.          Meds reviewed.  Current Outpatient Medications on File Prior to Visit   Medication Sig Dispense Refill    APPLE CIDER VINEGAR PO Take by mouth      atorvastatin (LIPITOR) 80 mg tablet take 1 tablet by mouth every evening 30 tablet 5    clopidogrel (Plavix) 75 mg tablet Take 1 tablet (75 mg total) by mouth daily Do not start before April 14, 2024. 90 tablet 3    metoprolol tartrate (LOPRESSOR) 25 mg tablet take 1 tablet by mouth every 12 hours 60 tablet 5    nitroglycerin (NITROSTAT) 0.4 mg SL tablet Place 1 tablet (0.4 mg total) under the tongue every 5 (five) minutes as needed for chest pain 30 tablet 2    Omega-3 Fatty Acids (fish oil) 1,000 mg Take 1,000 mg by mouth 2 (two) times a day      clopidogrel (PLAVIX) 300 mg Take 2 tablets (600 mg  total) by mouth once for 1 dose Do not start before April 13, 2024. 2 tablet 0     No current facility-administered medications on file prior to visit.      Past Medical History:   Diagnosis Date    Coronary artery disease     Gastroesophageal reflux disease, unspecified whether esophagitis present     Hyperlipidemia     ST elevation MI (STEMI) 03/21/2023       Social History     Tobacco Use   Smoking Status Never   Smokeless Tobacco Former     Family History   Problem Relation Age of Onset    No Known Problems Mother     Coronary artery disease Father     Kidney failure Father     Heart attack Father     Diabetes type II Father     Heart attack Brother 41        had stents

## 2024-10-24 NOTE — ASSESSMENT & PLAN NOTE
-Mild-moderate on transthoracic echocardiogram March 2023  -Patient denies significant symptoms  -Will continue to monitor

## 2024-10-24 NOTE — ASSESSMENT & PLAN NOTE
-Counseled on dietary and lifestyle modifications including following a low-salt, low-fat, heart healthy diet with sodium restriction to less than 1800 mg of sodium daily and DASH diet  -Patient will continue metoprolol tartrate 25 mg twice daily  -Patient will monitor home blood pressure readings let our office know significantly elevated greater than 130s/80s mmHg for up titration of medical therapy  -Continue to monitor

## 2024-11-29 DIAGNOSIS — I21.3 STEMI (ST ELEVATION MYOCARDIAL INFARCTION) (HCC): ICD-10-CM

## 2024-11-29 RX ORDER — ATORVASTATIN CALCIUM 80 MG/1
80 TABLET, FILM COATED ORAL EVERY EVENING
Qty: 30 TABLET | Refills: 5 | Status: SHIPPED | OUTPATIENT
Start: 2024-11-29

## 2024-11-29 RX ORDER — METOPROLOL TARTRATE 25 MG/1
25 TABLET, FILM COATED ORAL EVERY 12 HOURS
Qty: 60 TABLET | Refills: 5 | Status: SHIPPED | OUTPATIENT
Start: 2024-11-29

## 2025-01-09 DIAGNOSIS — I25.10 CORONARY ARTERY DISEASE INVOLVING NATIVE CORONARY ARTERY OF NATIVE HEART WITHOUT ANGINA PECTORIS: ICD-10-CM

## 2025-01-09 DIAGNOSIS — Z95.5 STATUS POST INSERTION OF DRUG ELUTING CORONARY ARTERY STENT: ICD-10-CM

## 2025-01-09 RX ORDER — CLOPIDOGREL BISULFATE 75 MG/1
75 TABLET ORAL DAILY
Qty: 90 TABLET | Refills: 1 | Status: SHIPPED | OUTPATIENT
Start: 2025-01-09

## 2025-01-09 NOTE — TELEPHONE ENCOUNTER
Reason for call:   [x] Refill   [] Prior Auth  [x] Other: Patient states he has no refills remaining at the Pharmacy    Office:   [] PCP/Provider -   [x] Specialty/Provider -  [facility] OLIVIA Light,      Medication: clopidogrel (Plavix) 75 mg tablet     Dose/Frequency: Take 1 tablet (75 mg total) by mouth daily     Quantity: 90 tablet     Pharmacy: [id number] - [place] [address] [phone]    Does the patient have enough for 3 days?   [] Yes   [x] No - Send as HP to POD

## 2025-04-03 ENCOUNTER — TELEPHONE (OUTPATIENT)
Dept: FAMILY MEDICINE CLINIC | Facility: CLINIC | Age: 60
End: 2025-04-03

## 2025-04-03 NOTE — TELEPHONE ENCOUNTER
Patient was unable to set-up physical right now due to hid job being on strike, instructed patient to call when he is ready to get physical scheduled.

## 2025-04-03 NOTE — TELEPHONE ENCOUNTER
----- Message from CARMEN Addison sent at 4/2/2025  9:38 AM EDT -----  Due for annual, please call to schedule

## 2025-04-07 NOTE — TELEPHONE ENCOUNTER
Patient called requesting refill for Plavix 75 mg. Patient made aware medication was refilled on 1/9/25 for 90 with 1 refills to [facility] pharmacy. Patient instructed to contact the pharmacy to obtain refills of medication. Patient verbalized understanding.

## 2025-04-09 ENCOUNTER — TELEPHONE (OUTPATIENT)
Dept: FAMILY MEDICINE CLINIC | Facility: CLINIC | Age: 60
End: 2025-04-09

## 2025-04-19 ENCOUNTER — APPOINTMENT (OUTPATIENT)
Dept: LAB | Facility: HOSPITAL | Age: 60
End: 2025-04-19
Payer: COMMERCIAL

## 2025-04-19 DIAGNOSIS — E78.00 HYPERCHOLESTEREMIA: ICD-10-CM

## 2025-04-19 LAB
CHOLEST SERPL-MCNC: 103 MG/DL (ref ?–200)
HDLC SERPL-MCNC: 27 MG/DL
LDLC SERPL CALC-MCNC: 36 MG/DL (ref 0–100)
TRIGL SERPL-MCNC: 199 MG/DL (ref ?–150)

## 2025-04-19 PROCEDURE — 36415 COLL VENOUS BLD VENIPUNCTURE: CPT

## 2025-04-19 PROCEDURE — 80061 LIPID PANEL: CPT

## 2025-04-21 ENCOUNTER — RESULTS FOLLOW-UP (OUTPATIENT)
Dept: CARDIOLOGY CLINIC | Facility: CLINIC | Age: 60
End: 2025-04-21

## 2025-04-29 ENCOUNTER — OFFICE VISIT (OUTPATIENT)
Dept: CARDIOLOGY CLINIC | Facility: CLINIC | Age: 60
End: 2025-04-29
Payer: COMMERCIAL

## 2025-04-29 VITALS
WEIGHT: 186 LBS | SYSTOLIC BLOOD PRESSURE: 110 MMHG | HEIGHT: 69 IN | DIASTOLIC BLOOD PRESSURE: 80 MMHG | HEART RATE: 74 BPM | BODY MASS INDEX: 27.55 KG/M2

## 2025-04-29 DIAGNOSIS — I25.10 CORONARY ARTERY DISEASE INVOLVING NATIVE CORONARY ARTERY OF NATIVE HEART WITHOUT ANGINA PECTORIS: Primary | ICD-10-CM

## 2025-04-29 DIAGNOSIS — I07.1 TRICUSPID VALVE INSUFFICIENCY, UNSPECIFIED ETIOLOGY: ICD-10-CM

## 2025-04-29 DIAGNOSIS — E66.3 OVERWEIGHT (BMI 25.0-29.9): ICD-10-CM

## 2025-04-29 DIAGNOSIS — E78.00 HYPERCHOLESTEREMIA: ICD-10-CM

## 2025-04-29 DIAGNOSIS — R03.0 ELEVATED BP WITHOUT DIAGNOSIS OF HYPERTENSION: ICD-10-CM

## 2025-04-29 PROCEDURE — 99214 OFFICE O/P EST MOD 30 MIN: CPT | Performed by: INTERNAL MEDICINE

## 2025-04-29 NOTE — ASSESSMENT & PLAN NOTE
- Mild to moderate on transthoracic echocardiogram March 2023  -Patient denies significant symptoms  -Continue to monitor

## 2025-04-29 NOTE — PROGRESS NOTES
Patient ID: Oscar Anton is a 60 y.o. male.        Plan:      Assessment & Plan  Coronary artery disease involving native coronary artery of native heart without angina pectoris  - Multivessel coronary artery disease with previous PCI to RCA then LAD in March 2023  - Patient denies significant symptoms  - Continue Plavix 75 mg daily, atorvastatin 80 mg daily, metoprolol tartrate 25 mg twice daily  - Continue to monitor  Tricuspid valve insufficiency, unspecified etiology  - Mild to moderate on transthoracic echocardiogram March 2023  -Patient denies significant symptoms  -Continue to monitor  Overweight (BMI 25.0-29.9)  - Counseled patient on dietary and lifestyle modifications  -Continue to monitor  Hypercholesteremia  - Counseled patient on dietary lifestyle modifications  -Continue atorvastatin 80 mg daily  -Continue to monitor  Elevated BP without diagnosis of hypertension  - Patient notes blood pressures at home are well-controlled we will continue to monitor  - Continue metoprolol tartrate 25 mg twice daily.      Follow up Plan/Other summary comments:  - ECG performed in the office today showing sinus rhythm heart rate 74 bpm  - Lipid panel 4/19/2025 showing total cholesterol 103, triglyceride 199, HDL 27, LDL 36  - Counseled patient on dietary and lifestyle modifications  - Will check CMP and CBC prior to next office visit  - Will see patient in 6 months or sooner if necessary  - Patient counseled if he were to have any warning or alarm type symptoms he is to seek emergency medical care immediately.    HPI:   - Patient is a 60-year-old male with inferior ST segment elevation MI in March 2023 who at the time had presented initially [facility] but was transferred [facility] in [place] found to have multivessel Ezra artery disease with PCI to call lesion in the mid RCA via right femoral access closed with Perclose severe radial loop that prohibited right radial access and then underwent  staged procedure in March 2023 with PCI to LAD successful IVUS guided PCI to proximal LAD via femoral access.  He presents to the office today for scheduled follow-up.  - Currently in the office he denies any chest pain, palpitations lightheadedness or dizziness, loss of consciousness, shortness of breath, lower knee edema, orthopnea or bendopnea.  Notes overall his blood pressures at home are very well-controlled predominantly in the 120 and 1 2 systolic range.  He is very physically active and can ease walk 4 city blocks on flat surface or up flights of stairs with no chest pain or anginal symptoms.  He states overall he has no medical issues and feels well.      Most recent or relevant cardiac/vascular testing:    -Transthoracic echocardiogram 3/22/2023 showing left ventricular systolic function normal estimated LVEF 60% with grade 1 diastolic dysfunction, normal right ventricular systolic function with mild to moderate tricuspid regurgitation and hypokinesis of the basal inferior segment     -Cardiac catheterization 3/21/2023 showing multivessel coronary artery disease with successful PCI to mid RCA     -Cardiac catheterization 3/27/2023 with drug-eluting stent placement to proximal LAD    ECG performed in the office today shows sinus rhythm heart rate 74 bpm      Past Surgical History:   Procedure Laterality Date    CARDIAC CATHETERIZATION N/A 3/21/2023    Procedure: Cardiac pci;  Surgeon: Reno Pike MD;  [facility];  Service: Cardiology    CARDIAC CATHETERIZATION N/A 3/21/2023    Procedure: Cardiac Coronary Angiogram;  Surgeon: Reno Pike MD;  [facility];  Service: Cardiology    CARDIAC CATHETERIZATION N/A 3/27/2023    Procedure: Cardiac pci;  Surgeon: Reno Pike MD;  [facility];  Service: Cardiology    FINGER SURGERY Right     Amputation of index finger with neurectomy (each), distal amputation        Review of Systems   Review of Systems  "  Constitutional:  Negative for chills, diaphoresis, fatigue and fever.   HENT:  Negative for trouble swallowing and voice change.    Eyes:  Negative for pain and redness.   Respiratory:  Negative for shortness of breath and wheezing.    Cardiovascular:  Negative for chest pain, palpitations and leg swelling.   Gastrointestinal:  Negative for abdominal pain, constipation, diarrhea, nausea and vomiting.   Genitourinary:  Negative for dysuria.   Musculoskeletal:  Positive for arthralgias. Negative for neck pain and neck stiffness.   Skin:  Negative for rash.   Neurological:  Negative for dizziness, syncope, light-headedness and headaches.   Psychiatric/Behavioral:  Negative for agitation and confusion.    All other systems reviewed and are negative.       Objective:     /80   Pulse 74   Ht 5' 9\" (1.753 m)   Wt 84.4 kg (186 lb)   BMI 27.47 kg/m²     PHYSICAL EXAM:  Physical Exam  Vitals reviewed.   Constitutional:       General: He is not in acute distress.     Appearance: Normal appearance. He is not diaphoretic.   HENT:      Head: Normocephalic and atraumatic.   Eyes:      General:         Right eye: No discharge.         Left eye: No discharge.   Neck:      Comments: Trachea midline, no significant JVD appreciated  Cardiovascular:      Rate and Rhythm: Normal rate and regular rhythm.      Heart sounds: Murmur (MICHELLE) heard.      No friction rub.   Pulmonary:      Effort: Pulmonary effort is normal. No respiratory distress.      Breath sounds: No wheezing.   Chest:      Chest wall: No tenderness.   Abdominal:      General: Bowel sounds are normal.      Palpations: Abdomen is soft.      Tenderness: There is no abdominal tenderness. There is no rebound.   Musculoskeletal:      Right lower leg: No edema.      Left lower leg: No edema.   Skin:     General: Skin is warm and dry.   Neurological:      Mental Status: He is alert.      Comments: Awake, alert, able to answer questions appropriately, able to move " extremities bilaterally.   Psychiatric:         Mood and Affect: Mood normal.         Behavior: Behavior normal.          Meds reviewed.  Current Outpatient Medications on File Prior to Visit   Medication Sig Dispense Refill    APPLE CIDER VINEGAR PO Take by mouth      atorvastatin (LIPITOR) 80 mg tablet take 1 tablet by mouth every evening 30 tablet 5    clopidogrel (Plavix) 75 mg tablet Take 1 tablet (75 mg total) by mouth daily 90 tablet 1    metoprolol tartrate (LOPRESSOR) 25 mg tablet take 1 tablet by mouth every 12 hours 60 tablet 5    nitroglycerin (NITROSTAT) 0.4 mg SL tablet Place 1 tablet (0.4 mg total) under the tongue every 5 (five) minutes as needed for chest pain 30 tablet 2    Omega-3 Fatty Acids (fish oil) 1,000 mg Take 1,000 mg by mouth 2 (two) times a day       No current facility-administered medications on file prior to visit.      Past Medical History:   Diagnosis Date    Coronary artery disease     Gastroesophageal reflux disease, unspecified whether esophagitis present     Hyperlipidemia     ST elevation MI (STEMI) 03/21/2023     Social History     Tobacco Use   Smoking Status Never   Smokeless Tobacco Former     Family History   Problem Relation Age of Onset    No Known Problems Mother     Coronary artery disease Father     Kidney failure Father     Heart attack Father     Diabetes type II Father     Heart attack Brother 41        had stents

## 2025-04-29 NOTE — ASSESSMENT & PLAN NOTE
- Counseled patient on dietary lifestyle modifications  -Continue atorvastatin 80 mg daily  -Continue to monitor

## 2025-04-29 NOTE — ASSESSMENT & PLAN NOTE
- Multivessel coronary artery disease with previous PCI to RCA then LAD in March 2023  - Patient denies significant symptoms  - Continue Plavix 75 mg daily, atorvastatin 80 mg daily, metoprolol tartrate 25 mg twice daily  - Continue to monitor

## 2025-04-29 NOTE — ASSESSMENT & PLAN NOTE
- Patient notes blood pressures at home are well-controlled we will continue to monitor  - Continue metoprolol tartrate 25 mg twice daily.

## 2025-04-30 PROCEDURE — 93000 ELECTROCARDIOGRAM COMPLETE: CPT | Performed by: INTERNAL MEDICINE

## 2025-06-10 DIAGNOSIS — I21.3 STEMI (ST ELEVATION MYOCARDIAL INFARCTION) (HCC): ICD-10-CM

## 2025-06-10 RX ORDER — METOPROLOL TARTRATE 25 MG/1
25 TABLET, FILM COATED ORAL EVERY 12 HOURS
Qty: 180 TABLET | Refills: 1 | Status: SHIPPED | OUTPATIENT
Start: 2025-06-10

## 2025-06-10 RX ORDER — ATORVASTATIN CALCIUM 80 MG/1
80 TABLET, FILM COATED ORAL EVERY EVENING
Qty: 90 TABLET | Refills: 1 | Status: SHIPPED | OUTPATIENT
Start: 2025-06-10

## 2025-06-10 NOTE — TELEPHONE ENCOUNTER
Reason for call:   [x] Refill   [] Prior Auth  [] Other:     Office:   [] PCP/Provider -   [x[facility]  Authorized By: Jj Light DO      Medication: atorvastatin (LIPITOR) 80 mg tablet    Dose/Frequency:  take 1 tablet by mouth every evening    Quantity: 90    Pharmacy: [address]   Does the patient have enough for 3 days?   [] Yes   [x] No - Send as HP to POD    [facility] Pharm[facility]   Does the patient have enough for 10 days?   [] Yes   [] No - Send as HP to POD    Reason for call:   [x] Refill   [] Prior Auth  [] Other:     Office:   [] PCP/Provider -   [x] [facility]  [facility]  Authorized By: Jj Light DO      Medication: metoprolol tartrate (LOPRESSOR) 25 mg tablet    Dose/Frequency: take 1 tablet by mouth every 12 hours    Quantity: 90    Pharmacy: [address]   Does the patient have enough for 3 days?   [] Yes   [x] No - Send as HP to POD    [facility] Away Pharmacy   Does the patient have enough for 10 days?   [] Yes   [] No - Send as HP to POD

## 2025-07-07 DIAGNOSIS — I25.10 CORONARY ARTERY DISEASE INVOLVING NATIVE CORONARY ARTERY OF NATIVE HEART WITHOUT ANGINA PECTORIS: ICD-10-CM

## 2025-07-07 DIAGNOSIS — Z95.5 STATUS POST INSERTION OF DRUG ELUTING CORONARY ARTERY STENT: ICD-10-CM

## 2025-07-07 RX ORDER — CLOPIDOGREL BISULFATE 75 MG/1
75 TABLET ORAL DAILY
Qty: 90 TABLET | Refills: 0 | Status: SHIPPED | OUTPATIENT
Start: 2025-07-07

## 2025-07-07 NOTE — TELEPHONE ENCOUNTER
Pt contacted Call Center requested refill of their medication.        Doctor Name: Dr. Light       Medication Name: Plavix       Dosage of Med: 75 mg       Frequency of Med: 1 tablet by mouth daily      Remaining Medication: 3 days      Pharmacy and Location: [facility] Preferred Callback Phone Number: [phone]       Thank you.

## 2025-07-07 NOTE — TELEPHONE ENCOUNTER
Left message to get his labs done early October, late September. And I also changed his appt with dr stern as the original date had to be changed due to the provider not being in the office.

## 2025-08-05 ENCOUNTER — HOSPITAL ENCOUNTER (EMERGENCY)
Facility: HOSPITAL | Age: 60
Discharge: HOME | End: 2025-08-05
Attending: EMERGENCY MEDICINE
Payer: COMMERCIAL

## 2025-08-05 ENCOUNTER — APPOINTMENT (EMERGENCY)
Dept: RADIOLOGY | Facility: HOSPITAL | Age: 60
End: 2025-08-05
Payer: COMMERCIAL

## 2025-08-06 ENCOUNTER — TELEPHONE (OUTPATIENT)
Dept: FAMILY MEDICINE CLINIC | Facility: CLINIC | Age: 60
End: 2025-08-06

## 2025-08-06 ENCOUNTER — TRANSITIONAL CARE MANAGEMENT (OUTPATIENT)
Dept: FAMILY MEDICINE CLINIC | Facility: CLINIC | Age: 60
End: 2025-08-06

## 2025-08-18 ENCOUNTER — OFFICE VISIT (OUTPATIENT)
Dept: FAMILY MEDICINE CLINIC | Facility: CLINIC | Age: 60
End: 2025-08-18
Payer: COMMERCIAL

## 2025-08-18 ENCOUNTER — TELEPHONE (OUTPATIENT)
Dept: FAMILY MEDICINE CLINIC | Facility: CLINIC | Age: 60
End: 2025-08-18

## 2025-08-18 VITALS
DIASTOLIC BLOOD PRESSURE: 82 MMHG | BODY MASS INDEX: 27.55 KG/M2 | OXYGEN SATURATION: 96 % | HEIGHT: 69 IN | HEART RATE: 67 BPM | SYSTOLIC BLOOD PRESSURE: 136 MMHG | WEIGHT: 186 LBS | TEMPERATURE: 98.6 F

## 2025-08-18 DIAGNOSIS — K21.9 GASTROESOPHAGEAL REFLUX DISEASE, UNSPECIFIED WHETHER ESOPHAGITIS PRESENT: Primary | ICD-10-CM

## 2025-08-18 DIAGNOSIS — Z23 ENCOUNTER FOR IMMUNIZATION: ICD-10-CM

## 2025-08-18 PROCEDURE — 99214 OFFICE O/P EST MOD 30 MIN: CPT | Performed by: NURSE PRACTITIONER

## 2025-08-18 RX ORDER — MULTIVIT-MIN/IRON/FOLIC ACID/K 18-600-40
CAPSULE ORAL
COMMUNITY

## 2025-08-18 RX ORDER — PANTOPRAZOLE SODIUM 40 MG/1
40 TABLET, DELAYED RELEASE ORAL
Qty: 90 TABLET | Refills: 0 | Status: SHIPPED | OUTPATIENT
Start: 2025-08-18 | End: 2025-11-16

## (undated) DEVICE — TREK CORONARY DILATATION CATHETER 3.0 MM X 15 MM / RAPID-EXCHANGE: Brand: TREK

## (undated) DEVICE — CORONARY IMAGING CATHETER: Brand: OPTICROSS™ 6 HD

## (undated) DEVICE — GUIDELINER CATHETERS ARE INTENDED TO BE USED IN CONJUNCTION WITH GUIDE CATHETERS TO ACCESS DISCRETE REGIONS OF THE CORONARY AND/OR PERIPHERAL VASCULATURE, AND TO FACILITATE PLACEMENT OF INTERVENTIONAL DEVICES.: Brand: GUIDELINER® V3 CATHETER

## (undated) DEVICE — RADIFOCUS OPTITORQUE ANGIOGRAPHIC CATHETER: Brand: OPTITORQUE

## (undated) DEVICE — NC TREK NEO™ CORONARY DILATATION CATHETER 4.00 MM X 20 MM / RAPID-EXCHANGE: Brand: NC TREK NEO™

## (undated) DEVICE — PINNACLE INTRODUCER SHEATH: Brand: PINNACLE

## (undated) DEVICE — DGW .035 FC J3MM 150CM TEF: Brand: EMERALD

## (undated) DEVICE — MICROPUNCTURE INTRODUCER SET SILHOUETTE TRANSITIONLESS PUSH-PLUS DESIGN - STIFFENED CANNULA WITH NITINOL WIRE GUIDE: Brand: MICROPUNCTURE

## (undated) DEVICE — CATH DIAG 5FR IMPULSE 100CM FR4

## (undated) DEVICE — CATH GUIDE LAUNCHER 6FR EBU 3.5

## (undated) DEVICE — BALLOON EUPHORA RX 2.5 X 20MM

## (undated) DEVICE — RUNTHROUGH NS EXTRA FLOPPY PTCA GUIDEWIRE: Brand: RUNTHROUGH

## (undated) DEVICE — NC TREK NEO™ CORONARY DILATATION CATHETER 3.50 MM X 12 MM / RAPID-EXCHANGE: Brand: NC TREK NEO™

## (undated) DEVICE — CATH GUIDE LAUNCHER 6FR JR4 110CM

## (undated) DEVICE — GLIDESHEATH BASIC HYDROPHILIC COATED INTRODUCER SHEATH: Brand: GLIDESHEATH

## (undated) DEVICE — GLIDESHEATH SLENDER STAINLESS STEEL KIT: Brand: GLIDESHEATH SLENDER

## (undated) DEVICE — CATH DIAG 5FR IMPULSE 100CM FL4